# Patient Record
Sex: MALE | Race: WHITE | NOT HISPANIC OR LATINO | Employment: OTHER | URBAN - METROPOLITAN AREA
[De-identification: names, ages, dates, MRNs, and addresses within clinical notes are randomized per-mention and may not be internally consistent; named-entity substitution may affect disease eponyms.]

---

## 2017-01-25 ENCOUNTER — LAB VISIT (OUTPATIENT)
Dept: LAB | Facility: HOSPITAL | Age: 62
End: 2017-01-25
Attending: INTERNAL MEDICINE
Payer: MEDICARE

## 2017-01-25 ENCOUNTER — OFFICE VISIT (OUTPATIENT)
Dept: INTERNAL MEDICINE | Facility: CLINIC | Age: 62
End: 2017-01-25
Payer: MEDICARE

## 2017-01-25 VITALS
SYSTOLIC BLOOD PRESSURE: 137 MMHG | HEIGHT: 71 IN | BODY MASS INDEX: 30.55 KG/M2 | DIASTOLIC BLOOD PRESSURE: 87 MMHG | TEMPERATURE: 98 F | OXYGEN SATURATION: 98 % | HEART RATE: 78 BPM | WEIGHT: 218.19 LBS

## 2017-01-25 DIAGNOSIS — K62.5 BRIGHT RED RECTAL BLEEDING: Primary | ICD-10-CM

## 2017-01-25 DIAGNOSIS — K62.5 BRIGHT RED RECTAL BLEEDING: ICD-10-CM

## 2017-01-25 DIAGNOSIS — I10 ESSENTIAL HYPERTENSION: ICD-10-CM

## 2017-01-25 LAB
ANION GAP SERPL CALC-SCNC: 9 MMOL/L
BASOPHILS # BLD AUTO: 0.03 K/UL
BASOPHILS NFR BLD: 0.4 %
BUN SERPL-MCNC: 24 MG/DL
CALCIUM SERPL-MCNC: 9.3 MG/DL
CHLORIDE SERPL-SCNC: 104 MMOL/L
CO2 SERPL-SCNC: 26 MMOL/L
CREAT SERPL-MCNC: 1.8 MG/DL
DIFFERENTIAL METHOD: ABNORMAL
EOSINOPHIL # BLD AUTO: 0.2 K/UL
EOSINOPHIL NFR BLD: 2.4 %
ERYTHROCYTE [DISTWIDTH] IN BLOOD BY AUTOMATED COUNT: 13.2 %
EST. GFR  (AFRICAN AMERICAN): 45.3 ML/MIN/1.73 M^2
EST. GFR  (NON AFRICAN AMERICAN): 39.2 ML/MIN/1.73 M^2
GLUCOSE SERPL-MCNC: 93 MG/DL
HCT VFR BLD AUTO: 41.1 %
HGB BLD-MCNC: 14 G/DL
LYMPHOCYTES # BLD AUTO: 3.2 K/UL
LYMPHOCYTES NFR BLD: 48.1 %
MCH RBC QN AUTO: 30.4 PG
MCHC RBC AUTO-ENTMCNC: 34.1 %
MCV RBC AUTO: 89 FL
MONOCYTES # BLD AUTO: 0.7 K/UL
MONOCYTES NFR BLD: 10.4 %
NEUTROPHILS # BLD AUTO: 2.6 K/UL
NEUTROPHILS NFR BLD: 38.7 %
PLATELET # BLD AUTO: 199 K/UL
PMV BLD AUTO: 9.1 FL
POTASSIUM SERPL-SCNC: 3.7 MMOL/L
RBC # BLD AUTO: 4.61 M/UL
SODIUM SERPL-SCNC: 139 MMOL/L
WBC # BLD AUTO: 6.72 K/UL

## 2017-01-25 PROCEDURE — 99999 PR PBB SHADOW E&M-EST. PATIENT-LVL III: CPT | Mod: PBBFAC,,, | Performed by: INTERNAL MEDICINE

## 2017-01-25 PROCEDURE — 1159F MED LIST DOCD IN RCRD: CPT | Mod: S$GLB,,, | Performed by: INTERNAL MEDICINE

## 2017-01-25 PROCEDURE — 99499 UNLISTED E&M SERVICE: CPT | Mod: S$GLB,,, | Performed by: INTERNAL MEDICINE

## 2017-01-25 PROCEDURE — 3075F SYST BP GE 130 - 139MM HG: CPT | Mod: S$GLB,,, | Performed by: INTERNAL MEDICINE

## 2017-01-25 PROCEDURE — 36415 COLL VENOUS BLD VENIPUNCTURE: CPT | Mod: PO

## 2017-01-25 PROCEDURE — 85025 COMPLETE CBC W/AUTO DIFF WBC: CPT | Mod: PO

## 2017-01-25 PROCEDURE — 3079F DIAST BP 80-89 MM HG: CPT | Mod: S$GLB,,, | Performed by: INTERNAL MEDICINE

## 2017-01-25 PROCEDURE — 80048 BASIC METABOLIC PNL TOTAL CA: CPT

## 2017-01-25 PROCEDURE — 99214 OFFICE O/P EST MOD 30 MIN: CPT | Mod: S$GLB,,, | Performed by: INTERNAL MEDICINE

## 2017-01-25 NOTE — PROGRESS NOTES
REASON FOR VISIT:  This is a 61-year-old male.  Reason for his visit: a unique   situation with his bowel function yesterday.  When he woke up yesterday morning   he went to the bathroom and there was a gush of bright red blood that came out   when he defecated.  He did not see any stool.  There was no abdominal pain.  No   rectal pain.  Then, he went to the bathroom yesterday afternoon, a small amount   came out.  He may have seen a little bit of brown feces, but was feeling well   otherwise.  This morning, he had a bowel function.  Everything was normal, a   solid, formed brown stool.  Before this occurred, he did not have any issues   with his bowel function and no rectal bleeding.  A colonoscopy in April 2016 was   normal other than a hyperplastic polyp.    MEDICAL HISTORY:  1.  Hypertension.  2.  Hyperlipidemia.  3.  Positive calcium score.  4.  Nephrolithiasis.    MEDICATIONS:  This is also a situation that is unique.  He has stopped all his   medications of allopurinol, benazepril, Zetia, fenofibrate, hydrochlorothiazide,   tamsulosin.  In November or December is when he had issues with   nephrolithiasis.  His blood pressure was low and therefore stopped these   medications and has not restarted.    PHYSICAL EXAMINATION:  VITAL SIGNS:  Weight is 218 pounds, pulse 76, blood pressure 137/92.  LUNGS:  Clear.  HEART:  Regular rate and rhythm.  ABDOMEN:  Active bowel sounds, soft, nontender.  Prostate is moderately   enlarged.  RECTAL:  Stool is brown, heme-negative.    IMPRESSION:  1.  Acute rectal bleeding, probably internal hemorrhoid bleed.  2.  Hypertension.    PLAN:    Today, I am going to get a CBC and basic metabolic profile.    We will not restart any medicine right now, but if so, I will restart just the   benazepril and maybe the fenofibrate.    Right now, he feels his urination is good. Flow is good most of the time and no   incomplete emptying.          /rosy [id number] review        JAM/ELIZABETH  dd: 01/25/2017  15:30:46 (CST)  td: 01/26/2017 03:40:13 (CST)  Doc ID   #[id number]  Job ID #[id number]    CC:

## 2017-01-25 NOTE — MR AVS SNAPSHOT
[facility] [address]  [address]  [address]  [facility] [address]  Phone: [phone]                  Demarco Means   2017 3:15 PM   Office Visit    Description:  Male : 1955   Provider:  Marky Mills MD   Department:  [facility] [address]           Reason for Visit     Rectal Bleeding           Diagnoses this Visit        Comments    Bright red rectal bleeding    -  Primary     Essential hypertension                To Do List           Future Appointments        Provider Department Dept Phone    2017 3:45 PM LAB, [place] [facility] [phone]      Goals (5 Years of Data)     None      [facility] On Call     [facility] Nurse Care Line -  Assistance  Registered nurses in the [facility] Center provide clinical advisement, health education, appointment booking, and other advisory services.  Call for this free service at [phone].             Medications           Message regarding Medications     Verify the changes and/or additions to your medication regime listed below are the same as discussed with your clinician today.  If any of these changes or additions are incorrect, please notify your healthcare provider.             Verify that the below list of medications is an accurate representation of the medications you are currently taking.  If none reported, the list may be blank. If incorrect, please contact your healthcare provider. Carry this list with you in case of emergency.           Current Medications     allopurinol (ZYLOPRIM) 100 MG tablet Take 1 tablet (100 mg total) by mouth once daily.    benazepril (LOTENSIN) 20 MG tablet Take 1 tablet (20 mg total) by mouth once daily.    coenzyme Q10 400 mg Cap Take by mouth.    ezetimibe (ZETIA) 10 mg tablet Take 1 tablet (10 mg total) by mouth once daily.    fenofibrate 160 mg Tab Take 1 tablet (160 mg total) by mouth once daily.    fenofibrate 160  "MG Tab TAKE ONE TABLET BY MOUTH ONE TIME DAILY    hydrochlorothiazide (MICROZIDE) 12.5 mg capsule Take 1 capsule (12.5 mg total) by mouth once daily.    sildenafil (VIAGRA) 100 MG tablet Take 1 tablet (100 mg total) by mouth daily as needed for Erectile Dysfunction.    tamsulosin (FLOMAX) 0.4 mg Cp24 TAKE ONE CAPSULE BY MOUTH ONE TIME DAILY           Clinical Reference Information           Vital Signs - Last Recorded  Most recent update: 1/25/2017  3:07 PM by Desire Lema MA    BP Pulse Temp Ht Wt SpO2    137/87 (BP Location: Left arm, Patient Position: Sitting, BP Method: Automatic) 78 98 °F (36.7 °C) (Oral) 5' 10.5" (1.791 m) 99 kg (218 lb 3.2 oz) 98%    BMI                30.87 kg/m2          Blood Pressure          Most Recent Value    BP  137/87      Allergies as of 1/25/2017     1,3-butylene Glycol    Ciprofloxacin    Levaquin [Levofloxacin]      Immunizations Administered on Date of Encounter - 1/25/2017     None      Orders Placed During Today's Visit     Future Labs/Procedures Expected by Expires    Basic metabolic panel  1/25/2017 1/25/2018    CBC auto differential  1/25/2017 1/25/2018      [facility] Sign-Up     Activating your [facility] account is as easy as 1-2-3!     1) Visit [email or web address], select Sign Up Now, enter this activation code and your date of birth, then select Next.  [email or web address]  Expires: 3/11/2017  3:31 PM      2) Create a username and password to use when you visit [facility] in the future and select a security question in case you lose your password and select Next.    3) Enter your e-mail address and click Sign Up!    Additional Information  If you have questions, please e-mail [email or web address] or call [phone] to talk to our [facility] staff. Remember, [facility] is NOT to be used for urgent needs. For medical emergencies, dial 911.         "

## 2017-02-09 ENCOUNTER — TELEPHONE (OUTPATIENT)
Dept: INTERNAL MEDICINE | Facility: CLINIC | Age: 62
End: 2017-02-09

## 2017-02-09 NOTE — TELEPHONE ENCOUNTER
Pt is requesting rx for sore throat (Amoxicillin) pt is also  requesting lab results for 1/25/17    Please advise thanks.

## 2017-02-09 NOTE — TELEPHONE ENCOUNTER
----- Message from Brendon Cunha sent at 2/9/2017 10:18 AM CST -----  Contact: Self/[phone] cell  Patient is calling to request medication for a sore throat. He would like the Rx sent to the [facility] on file. Please call and advise.    Thank you!

## 2017-02-10 ENCOUNTER — TELEPHONE (OUTPATIENT)
Dept: INTERNAL MEDICINE | Facility: CLINIC | Age: 62
End: 2017-02-10

## 2017-02-10 ENCOUNTER — OFFICE VISIT (OUTPATIENT)
Dept: INTERNAL MEDICINE | Facility: CLINIC | Age: 62
End: 2017-02-10
Payer: MEDICARE

## 2017-02-10 VITALS
WEIGHT: 219 LBS | DIASTOLIC BLOOD PRESSURE: 88 MMHG | SYSTOLIC BLOOD PRESSURE: 127 MMHG | TEMPERATURE: 98 F | BODY MASS INDEX: 31.35 KG/M2 | HEIGHT: 70 IN | HEART RATE: 92 BPM

## 2017-02-10 DIAGNOSIS — J01.00 ACUTE NON-RECURRENT MAXILLARY SINUSITIS: Primary | ICD-10-CM

## 2017-02-10 DIAGNOSIS — N18.3 CKD (CHRONIC KIDNEY DISEASE), STAGE 3 (MODERATE): ICD-10-CM

## 2017-02-10 PROCEDURE — 3074F SYST BP LT 130 MM HG: CPT | Mod: S$GLB,,, | Performed by: INTERNAL MEDICINE

## 2017-02-10 PROCEDURE — 99999 PR PBB SHADOW E&M-EST. PATIENT-LVL III: CPT | Mod: PBBFAC,,, | Performed by: INTERNAL MEDICINE

## 2017-02-10 PROCEDURE — 96372 THER/PROPH/DIAG INJ SC/IM: CPT | Mod: S$GLB,,, | Performed by: INTERNAL MEDICINE

## 2017-02-10 PROCEDURE — 3079F DIAST BP 80-89 MM HG: CPT | Mod: S$GLB,,, | Performed by: INTERNAL MEDICINE

## 2017-02-10 PROCEDURE — 99213 OFFICE O/P EST LOW 20 MIN: CPT | Mod: 25,S$GLB,, | Performed by: INTERNAL MEDICINE

## 2017-02-10 RX ORDER — AMOXICILLIN 875 MG/1
875 TABLET, FILM COATED ORAL 2 TIMES DAILY
Qty: 20 TABLET | Refills: 0 | Status: SHIPPED | OUTPATIENT
Start: 2017-02-10 | End: 2017-04-26

## 2017-02-10 RX ORDER — BETAMETHASONE SODIUM PHOSPHATE AND BETAMETHASONE ACETATE 3; 3 MG/ML; MG/ML
12 INJECTION, SUSPENSION INTRA-ARTICULAR; INTRALESIONAL; INTRAMUSCULAR; SOFT TISSUE ONCE
Status: COMPLETED | OUTPATIENT
Start: 2017-02-10 | End: 2017-02-10

## 2017-02-10 RX ADMIN — BETAMETHASONE SODIUM PHOSPHATE AND BETAMETHASONE ACETATE 12 MG: 3; 3 INJECTION, SUSPENSION INTRA-ARTICULAR; INTRALESIONAL; INTRAMUSCULAR; SOFT TISSUE at 04:02

## 2017-02-10 NOTE — MR AVS SNAPSHOT
[facility] [address]  [address]  [address]  [facility] [address]  Phone: [phone]                  Demarco Means   2/10/2017 4:15 PM   Office Visit    Description:  Male : 1955   Provider:  Marky Mills MD   Department:  [facility] [address]           Reason for Visit     Cough     URI           Diagnoses this Visit        Comments    Acute non-recurrent maxillary sinusitis    -  Primary     CKD (chronic kidney disease), stage 3 (moderate)                To Do List           Goals (5 Years of Data)     None       These Medications        Disp Refills Start End    amoxicillin (AMOXIL) 875 MG tablet 20 tablet 0 2/10/2017     Take 1 tablet (875 mg total) by mouth 2 (two) times daily. - Oral    [facility] - [place] [address] #: [phone]         [facility]     [facility] On Call Nurse Care Line -  Assistance  Registered nurses in the [facility] On Call Center provide clinical advisement, health education, appointment booking, and other advisory services.  Call for this free service at [phone].             Medications           Message regarding Medications     Verify the changes and/or additions to your medication regime listed below are the same as discussed with your clinician today.  If any of these changes or additions are incorrect, please notify your healthcare provider.        START taking these NEW medications        Refills    amoxicillin (AMOXIL) 875 MG tablet 0    Sig: Take 1 tablet (875 mg total) by mouth 2 (two) times daily.    Class: Normal    Route: Oral      These medications were administered today        Dose Freq    betamethasone acetate-betamethasone sodium phosphate injection 12 mg 12 mg Once    Sig: Inject 2 mLs (12 mg total) into the muscle once.    Class: Normal    Route: Intramuscular      STOP taking these medications     allopurinol (ZYLOPRIM) 100 MG  "tablet Take 1 tablet (100 mg total) by mouth once daily.    ezetimibe (ZETIA) 10 mg tablet Take 1 tablet (10 mg total) by mouth once daily.    fenofibrate 160 mg Tab Take 1 tablet (160 mg total) by mouth once daily.    fenofibrate 160 MG Tab TAKE ONE TABLET BY MOUTH ONE TIME DAILY    hydrochlorothiazide (MICROZIDE) 12.5 mg capsule Take 1 capsule (12.5 mg total) by mouth once daily.    tamsulosin (FLOMAX) 0.4 mg Cp24 TAKE ONE CAPSULE BY MOUTH ONE TIME DAILY           Verify that the below list of medications is an accurate representation of the medications you are currently taking.  If none reported, the list may be blank. If incorrect, please contact your healthcare provider. Carry this list with you in case of emergency.           Current Medications     amoxicillin (AMOXIL) 875 MG tablet Take 1 tablet (875 mg total) by mouth 2 (two) times daily.    benazepril (LOTENSIN) 20 MG tablet Take 1 tablet (20 mg total) by mouth once daily.    coenzyme Q10 400 mg Cap Take by mouth.    sildenafil (VIAGRA) 100 MG tablet Take 1 tablet (100 mg total) by mouth daily as needed for Erectile Dysfunction.           Clinical Reference Information           Your Vitals Were     BP Pulse Temp Height Weight BMI    127/88 92 97.9 °F (36.6 °C) 5' 10" (1.778 m) 99.3 kg (219 lb) 31.42 kg/m2      Blood Pressure          Most Recent Value    BP  127/88      Allergies as of 2/10/2017     1,3-butylene Glycol    Ciprofloxacin    Levaquin [Levofloxacin]      Immunizations Administered on Date of Encounter - 2/10/2017     None      Orders Placed During Today's Visit     Future Labs/Procedures Expected by Expires    Basic metabolic panel  2/20/2017 2/10/2018      [facility] Sign-Up     Activating your [facility] account is as easy as 1-2-3!     1) Visit [email or web address], select Sign Up Now, enter this activation code and your date of birth, then select Next.  [email or web address]  Expires: 3/11/2017  3:31 PM      2) Create a username and password to use " when you visit [facility] in the future and select a security question in case you lose your password and select Next.    3) Enter your e-mail address and click Sign Up!    Additional Information  If you have questions, please e-mail [email or web address] or call [phone] to talk to our [facility] staff. Remember, [facility] is NOT to be used for urgent needs. For medical emergencies, dial 911.         Language Assistance Services     ATTENTION: Language assistance services are available, free of charge. Please call [phone].      ATENCIÓN: Si habla español, tiene a mazariegos disposición servicios gratuitos de asistencia lingüística. Llame al [phone].     CHÚ Ý: N?u b?n nói Ti?ng Vi?t, có các d?ch v? h? tr? ngôn ng? mi?n phí dành cho b?n. G?i s? [phone].         [facility] [address] complies with applicable Federal civil rights laws and does not discriminate on the basis of race, color, national origin, age, disability, or sex.

## 2017-02-10 NOTE — TELEPHONE ENCOUNTER
----- Message from Bernarda Calix sent at 2/10/2017  8:07 AM CST -----  Contact: Self/[phone] cell   Pt said that he is calling in regards to needing to check the status of the doctor sending a rx for a sore throat and cold to the [facility] on file pt stated that he called on yesterday and has not received a return call. Please call and advise          Thank you

## 2017-02-10 NOTE — PROGRESS NOTES
REASON FOR VISIT:  This is a 61-year-old male.  For four to five days now, he   has been congested in his head and his throat is hurting.  He hears a pop and he   is blowing out thick green mucus, feels very weak.  No chest pain, shortness of   breath.  He recently was taking care of his two grandchildren who were also   sick.  Also, while he is here, it was noted from his last visit that his   creatinine still remains elevated at 1.8.  I feel that we are going to need to   repeat this.  I am going to repeat this in 10 days, but then during this time,   proper hydration was discussed.    IMPRESSION:  Acute sinusitis.    PLAN:  Amoxicillin 875 mg twice a day for 10 days and Celestone 12 mg IM.  Use   of Mucinex.    /sc [id number] review      JAM/ELIZABETH  dd: 02/10/2017 16:38:51 (CST)  td: 02/11/2017 07:58:33 (CST)  Doc ID   #[id number]  Job ID #[id number]    CC:

## 2017-02-10 NOTE — PROGRESS NOTES
Verified patients name and date of birth, instructed patient to remain in building for 15 min, patient acknowledged understanding of instructions, patient tolerated injection well.

## 2017-02-13 ENCOUNTER — TELEPHONE (OUTPATIENT)
Dept: INTERNAL MEDICINE | Facility: CLINIC | Age: 62
End: 2017-02-13

## 2017-02-13 NOTE — TELEPHONE ENCOUNTER
----- Message from Marky Mills MD sent at 2/10/2017  4:40 PM CST -----  Set up bmp lab on 2-20 monday  MD Joanie Jose MA                     Set up bmp lab on 2-20 monday         Patient schedule for BMP and noti

## 2017-02-15 ENCOUNTER — TELEPHONE (OUTPATIENT)
Dept: INTERNAL MEDICINE | Facility: CLINIC | Age: 62
End: 2017-02-15

## 2017-02-15 NOTE — TELEPHONE ENCOUNTER
----- Message from Marky Mills MD sent at 2/10/2017  4:40 PM CST -----  Set up bmp lab on 2-20 monday  Patient lab schedule for 2/20/17 and notified...by phone.

## 2017-02-20 ENCOUNTER — LAB VISIT (OUTPATIENT)
Dept: LAB | Facility: HOSPITAL | Age: 62
End: 2017-02-20
Attending: INTERNAL MEDICINE
Payer: MEDICARE

## 2017-02-20 DIAGNOSIS — N18.3 CKD (CHRONIC KIDNEY DISEASE), STAGE 3 (MODERATE): ICD-10-CM

## 2017-02-20 LAB
ANION GAP SERPL CALC-SCNC: 10 MMOL/L
BUN SERPL-MCNC: 20 MG/DL
CALCIUM SERPL-MCNC: 9.4 MG/DL
CHLORIDE SERPL-SCNC: 102 MMOL/L
CO2 SERPL-SCNC: 27 MMOL/L
CREAT SERPL-MCNC: 1.3 MG/DL
EST. GFR  (AFRICAN AMERICAN): >60 ML/MIN/1.73 M^2
EST. GFR  (NON AFRICAN AMERICAN): 57.7 ML/MIN/1.73 M^2
GLUCOSE SERPL-MCNC: 100 MG/DL
POTASSIUM SERPL-SCNC: 4 MMOL/L
SODIUM SERPL-SCNC: 139 MMOL/L

## 2017-02-20 PROCEDURE — 36415 COLL VENOUS BLD VENIPUNCTURE: CPT | Mod: PO

## 2017-02-20 PROCEDURE — 80048 BASIC METABOLIC PNL TOTAL CA: CPT

## 2017-04-21 DIAGNOSIS — I10 ESSENTIAL HYPERTENSION: ICD-10-CM

## 2017-04-21 DIAGNOSIS — N18.2 CHRONIC KIDNEY DISEASE, STAGE II (MILD): ICD-10-CM

## 2017-04-21 RX ORDER — BENAZEPRIL HYDROCHLORIDE 20 MG/1
20 TABLET ORAL DAILY
Qty: 90 TABLET | Refills: 3 | Status: SHIPPED | OUTPATIENT
Start: 2017-04-21 | End: 2018-04-17 | Stop reason: SDUPTHER

## 2017-04-21 NOTE — TELEPHONE ENCOUNTER
----- Message from Daxa Riddle sent at 4/21/2017  9:41 AM CDT -----  Contact: pt [phone]  Pt would like a call back regarding transferring his prescription to a different pharmacy

## 2017-04-21 NOTE — TELEPHONE ENCOUNTER
Pt states that he moved to [place] and wanted to change his pharmacy. He is also requesting the benazepril be refill

## 2017-04-26 ENCOUNTER — OFFICE VISIT (OUTPATIENT)
Dept: INTERNAL MEDICINE | Facility: CLINIC | Age: 62
End: 2017-04-26
Payer: MEDICARE

## 2017-04-26 ENCOUNTER — LAB VISIT (OUTPATIENT)
Dept: LAB | Facility: HOSPITAL | Age: 62
End: 2017-04-26
Attending: INTERNAL MEDICINE
Payer: MEDICARE

## 2017-04-26 VITALS
HEIGHT: 70 IN | HEART RATE: 65 BPM | WEIGHT: 215.81 LBS | DIASTOLIC BLOOD PRESSURE: 88 MMHG | SYSTOLIC BLOOD PRESSURE: 132 MMHG | BODY MASS INDEX: 30.9 KG/M2

## 2017-04-26 DIAGNOSIS — I10 ESSENTIAL HYPERTENSION: Primary | ICD-10-CM

## 2017-04-26 DIAGNOSIS — N18.3 CKD (CHRONIC KIDNEY DISEASE), STAGE 3 (MODERATE): ICD-10-CM

## 2017-04-26 DIAGNOSIS — N20.0 NEPHROLITHIASIS: ICD-10-CM

## 2017-04-26 DIAGNOSIS — E78.5 HYPERLIPIDEMIA, UNSPECIFIED HYPERLIPIDEMIA TYPE: ICD-10-CM

## 2017-04-26 DIAGNOSIS — I10 ESSENTIAL HYPERTENSION: ICD-10-CM

## 2017-04-26 LAB
ANION GAP SERPL CALC-SCNC: 8 MMOL/L
BUN SERPL-MCNC: 18 MG/DL
CALCIUM SERPL-MCNC: 9.8 MG/DL
CHLORIDE SERPL-SCNC: 105 MMOL/L
CHOLEST/HDLC SERPL: 6.3 {RATIO}
CO2 SERPL-SCNC: 28 MMOL/L
CREAT SERPL-MCNC: 1.2 MG/DL
EST. GFR  (AFRICAN AMERICAN): >60 ML/MIN/1.73 M^2
EST. GFR  (NON AFRICAN AMERICAN): >60 ML/MIN/1.73 M^2
GLUCOSE SERPL-MCNC: 93 MG/DL
HDL/CHOLESTEROL RATIO: 16 %
HDLC SERPL-MCNC: 219 MG/DL
HDLC SERPL-MCNC: 35 MG/DL
LDLC SERPL CALC-MCNC: 149.4 MG/DL
NONHDLC SERPL-MCNC: 184 MG/DL
POTASSIUM SERPL-SCNC: 4.2 MMOL/L
SODIUM SERPL-SCNC: 141 MMOL/L
TRIGL SERPL-MCNC: 173 MG/DL
URATE SERPL-MCNC: 7.4 MG/DL

## 2017-04-26 PROCEDURE — 3075F SYST BP GE 130 - 139MM HG: CPT | Mod: S$GLB,,, | Performed by: INTERNAL MEDICINE

## 2017-04-26 PROCEDURE — 36415 COLL VENOUS BLD VENIPUNCTURE: CPT | Mod: PO

## 2017-04-26 PROCEDURE — 80061 LIPID PANEL: CPT

## 2017-04-26 PROCEDURE — 1160F RVW MEDS BY RX/DR IN RCRD: CPT | Mod: S$GLB,,, | Performed by: INTERNAL MEDICINE

## 2017-04-26 PROCEDURE — 3079F DIAST BP 80-89 MM HG: CPT | Mod: S$GLB,,, | Performed by: INTERNAL MEDICINE

## 2017-04-26 PROCEDURE — 99214 OFFICE O/P EST MOD 30 MIN: CPT | Mod: S$GLB,,, | Performed by: INTERNAL MEDICINE

## 2017-04-26 PROCEDURE — 80048 BASIC METABOLIC PNL TOTAL CA: CPT

## 2017-04-26 PROCEDURE — 99999 PR PBB SHADOW E&M-EST. PATIENT-LVL III: CPT | Mod: PBBFAC,,, | Performed by: INTERNAL MEDICINE

## 2017-04-26 PROCEDURE — 84550 ASSAY OF BLOOD/URIC ACID: CPT

## 2017-04-26 NOTE — PROGRESS NOTES
REASON FOR VISIT:  The patient is a 62-year-old male who comes in regarding his   blood pressure.  Starting about a week ago, he has been checking his readings.    He has gotten diastolic sometimes in the 140s to as high as 184, consistently in   the 150s, and then diastolics in the 90s, one time being 104, other time being   84.  Last week, he was a little bit jittery because of a move that he is making   to [place].  That is when he started taking it again.  Previously, he was   getting good readings.  At one point, it was reported this year, he did cut his   benazepril 20 mg to half a tablet because it was getting low, but past week, he   went back to 20 mg, but despite this, he still is getting elevated readings, and   actually somewhere along in December, because of having weakness,   gastroenteritis, he has stopped a number of his medications, which include   benazepril at that time.  Benazepril was restarted from a visit in January, but   he was on allopurinol and hydrochlorothiazide and Flomax.  They were given   because of BPH, but also had nephrolithiasis with high urine calcium and uric   acid.    MEDICAL HISTORY:  1.  Hypertension.  2.  Hyperlipidemia.  3.  BPH.  4.  Nephrolithiasis.  5.  Lumbar degenerative disk disease.    CURRENT MEDICINES:  Right now include benazepril 20 mg a day.    REVIEW OF SYMPTOMS:  No chronic chest pain, shortness of breath or palpitations.    PHYSICAL EXAMINATION:  VITAL SIGNS:  Weight is 215 pounds, pulse 60 to 65, blood pressure by me in the   right arm 130/86, left arm 132/88.  LUNGS:  Clear.  HEART:  Regular rate and rhythm.  No murmurs.  2+ carotid pulses.    Also, I have noted with his labs that he does have a degree of chronic kidney   disease stage III based on his creatinine.  It did go up a little bit more when   he was having the diarrhea, but last visit, it was 1.3 back in February.    IMPRESSION:  1.  Hypertension.  2.  Chronic kidney disease, stage III.  3.   Nephrolithiasis.  4.  Hyperlipidemia.    PLAN:    Today while he is here we will be getting chemistry profile, lipid profile and   we will check uric acid.   Phone review to followup.        /rosy [id number] blank(s)        JAM/ELIZABETH  dd: 04/26/2017 12:05:53 (CDT)  td: 04/27/2017 03:32:52 (CDT)  Doc ID   #[id number]  Job ID #[id number]    CC:

## 2017-04-26 NOTE — MR AVS SNAPSHOT
[facility] [address]  [address]  [address]  [facility] [address]  Phone: [phone]                  Demarco Means   2017 11:30 AM   Office Visit    Description:  Male : 1955   Provider:  Marky Mills MD   Department:  [facility] [address]           Reason for Visit     Blood Pressure Check           Diagnoses this Visit        Comments    Essential hypertension    -  Primary     Hyperlipidemia, unspecified hyperlipidemia type         CKD (chronic kidney disease), stage 3 (moderate)         Nephrolithiasis                To Do List           Goals (5 Years of Data)     None      [facility]     [facility] On Call Nurse Care Line -  Assistance  Unless otherwise directed by your provider, please contact [facility], our nurse care line that is available for  assistance.     Registered nurses in the [facility] On Call Center provide: appointment scheduling, clinical advisement, health education, and other advisory services.  Call: [phone] (toll free)               Medications           Message regarding Medications     Verify the changes and/or additions to your medication regime listed below are the same as discussed with your clinician today.  If any of these changes or additions are incorrect, please notify your healthcare provider.        STOP taking these medications     amoxicillin (AMOXIL) 875 MG tablet Take 1 tablet (875 mg total) by mouth 2 (two) times daily.           Verify that the below list of medications is an accurate representation of the medications you are currently taking.  If none reported, the list may be blank. If incorrect, please contact your healthcare provider. Carry this list with you in case of emergency.           Current Medications     benazepril (LOTENSIN) 20 MG tablet Take 1 tablet (20 mg total) by mouth once daily.    coenzyme Q10 400 mg Cap Take by mouth.    sildenafil (VIAGRA) 100 MG  "tablet Take 1 tablet (100 mg total) by mouth daily as needed for Erectile Dysfunction.           Clinical Reference Information           Your Vitals Were     BP Pulse Height Weight BMI    132/88 65 5' 10" (1.778 m) 97.9 kg (215 lb 12.8 oz) 30.96 kg/m2      Blood Pressure          Most Recent Value    BP  132/88      Allergies as of 4/26/2017     1,3-butylene Glycol    Ciprofloxacin    Levaquin [Levofloxacin]      Immunizations Administered on Date of Encounter - 4/26/2017     None      Orders Placed During Today's Visit     Future Labs/Procedures Expected by Expires    Basic metabolic panel  4/26/2017 4/26/2018    Lipid panel  4/26/2017 4/26/2018    Uric acid  4/26/2017 4/26/2018      [facility] Sign-Up     Activating your [facility] account is as easy as 1-2-3!     1) Visit [email or web address], select Sign Up Now, enter this activation code and your date of birth, then select Next.  [email or web address]  Expires: 6/10/2017 12:07 PM      2) Create a username and password to use when you visit [facility] in the future and select a security question in case you lose your password and select Next.    3) Enter your e-mail address and click Sign Up!    Additional Information  If you have questions, please e-mail [email or web address] or call [phone] to talk to our [facility] staff. Remember, [facility] is NOT to be used for urgent needs. For medical emergencies, dial 911.         Language Assistance Services     ATTENTION: Language assistance services are available, free of charge. Please call [phone].      ATENCIÓN: Si habla español, tiene a mazariegos disposición servicios gratuitos de asistencia lingüística. Llame al [phone].     CHÚ Ý: N?u b?n nói Ti?ng Vi?t, có các d?ch v? h? tr? ngôn ng? mi?n phí dành cho b?n. G?i s? [phone].         [facility] [address] complies with applicable Federal civil rights laws and does not discriminate on the basis of race, color, national origin, age, disability, or " sex.

## 2017-04-27 ENCOUNTER — TELEPHONE (OUTPATIENT)
Dept: INTERNAL MEDICINE | Facility: CLINIC | Age: 62
End: 2017-04-27

## 2017-04-27 ENCOUNTER — DOCUMENTATION ONLY (OUTPATIENT)
Dept: INTERNAL MEDICINE | Facility: CLINIC | Age: 62
End: 2017-04-27

## 2017-04-27 DIAGNOSIS — E78.2 MIXED HYPERLIPIDEMIA: ICD-10-CM

## 2017-04-27 DIAGNOSIS — Z00.00 ANNUAL PHYSICAL EXAM: Primary | ICD-10-CM

## 2017-04-27 DIAGNOSIS — Z12.5 ENCOUNTER FOR SCREENING FOR MALIGNANT NEOPLASM OF PROSTATE: ICD-10-CM

## 2017-04-27 DIAGNOSIS — R93.1 AGATSTON CAC SCORE, >400: ICD-10-CM

## 2017-04-27 DIAGNOSIS — I10 ESSENTIAL HYPERTENSION: ICD-10-CM

## 2017-04-27 RX ORDER — ALLOPURINOL 100 MG/1
100 TABLET ORAL DAILY
Qty: 30 TABLET | Refills: 11 | Status: SHIPPED | OUTPATIENT
Start: 2017-04-27 | End: 2018-05-13 | Stop reason: SDUPTHER

## 2017-04-27 RX ORDER — FENOFIBRATE 160 MG/1
160 TABLET ORAL DAILY
Qty: 30 TABLET | Refills: 11
Start: 2017-04-27 | End: 2017-05-25 | Stop reason: SDUPTHER

## 2017-04-27 NOTE — PROGRESS NOTES
Lab studies noted      Restart allopurinol 100 mg daily    He will restart fenofibrate at 160 mg daily    He believes this did not cause any issues with him    Physical in 3 months

## 2017-04-27 NOTE — TELEPHONE ENCOUNTER
----- Message from Nirmala Nunez sent at 4/27/2017  9:59 AM CDT -----  Contact: Patient  Type: Returning a call    Who left a message? Dr. Mills    When did the practice call? Today     Comments: Please call the patient at [phone].    Thanks!

## 2017-04-27 NOTE — TELEPHONE ENCOUNTER
Physical in 3 months with prelabs                 Patient schedule for 3 month Phys appt/prior labs.  Appt's mail out to home address on file.

## 2017-05-17 ENCOUNTER — OFFICE VISIT (OUTPATIENT)
Dept: INTERNAL MEDICINE | Facility: CLINIC | Age: 62
End: 2017-05-17
Payer: MEDICARE

## 2017-05-17 VITALS
SYSTOLIC BLOOD PRESSURE: 140 MMHG | BODY MASS INDEX: 31.34 KG/M2 | HEART RATE: 82 BPM | HEIGHT: 70 IN | WEIGHT: 218.94 LBS | DIASTOLIC BLOOD PRESSURE: 70 MMHG

## 2017-05-17 DIAGNOSIS — K21.9 GASTROESOPHAGEAL REFLUX DISEASE, ESOPHAGITIS PRESENCE NOT SPECIFIED: ICD-10-CM

## 2017-05-17 DIAGNOSIS — I10 ESSENTIAL HYPERTENSION: ICD-10-CM

## 2017-05-17 DIAGNOSIS — E78.5 HYPERLIPIDEMIA, UNSPECIFIED HYPERLIPIDEMIA TYPE: ICD-10-CM

## 2017-05-17 DIAGNOSIS — M54.50 CHRONIC LOW BACK PAIN WITHOUT SCIATICA, UNSPECIFIED BACK PAIN LATERALITY: ICD-10-CM

## 2017-05-17 DIAGNOSIS — I70.0 AORTIC ATHEROSCLEROSIS: ICD-10-CM

## 2017-05-17 DIAGNOSIS — G89.29 CHRONIC LOW BACK PAIN WITHOUT SCIATICA, UNSPECIFIED BACK PAIN LATERALITY: ICD-10-CM

## 2017-05-17 DIAGNOSIS — H81.09 MENIERE DISEASE, UNSPECIFIED LATERALITY: ICD-10-CM

## 2017-05-17 DIAGNOSIS — Z00.00 ENCOUNTER FOR PREVENTIVE HEALTH EXAMINATION: Primary | ICD-10-CM

## 2017-05-17 DIAGNOSIS — N18.2 CKD (CHRONIC KIDNEY DISEASE), STAGE 2 (MILD): ICD-10-CM

## 2017-05-17 DIAGNOSIS — N40.0 BENIGN NODULAR PROSTATIC HYPERPLASIA WITHOUT LOWER URINARY TRACT SYMPTOMS: ICD-10-CM

## 2017-05-17 PROCEDURE — 3078F DIAST BP <80 MM HG: CPT | Mod: S$GLB,,, | Performed by: NURSE PRACTITIONER

## 2017-05-17 PROCEDURE — G0439 PPPS, SUBSEQ VISIT: HCPCS | Mod: S$GLB,,, | Performed by: NURSE PRACTITIONER

## 2017-05-17 PROCEDURE — 99999 PR PBB SHADOW E&M-EST. PATIENT-LVL III: CPT | Mod: PBBFAC,,, | Performed by: NURSE PRACTITIONER

## 2017-05-17 PROCEDURE — 3077F SYST BP >= 140 MM HG: CPT | Mod: S$GLB,,, | Performed by: NURSE PRACTITIONER

## 2017-05-17 NOTE — PROGRESS NOTES
"Demarco Moseley Miguelangel presented for a  Medicare AWV and comprehensive Health Risk Assessment today. The following components were reviewed and updated:    · Medical history  · Family History  · Social history  · Allergies and Current Medications  · Health Risk Assessment  · Health Maintenance  · Care Team     ** See Completed Assessments for Annual Wellness Visit within the encounter summary.**       The following assessments were completed:  · Living Situation  · CAGE  · Depression Screening  · Timed Get Up and Go  · Whisper Test  · Cognitive Function Screening  ·   ·   ·   · Nutrition Screening  · ADL Screening  · PAQ Screening    Vitals:    05/17/17 1539   BP: (!) 140/70   BP Location: Left arm   Pulse: 82   Weight: 99.3 kg (218 lb 14.7 oz)   Height: 5' 10" (1.778 m)     Body mass index is 31.41 kg/(m^2).  Physical Exam      Diagnoses and health risks identified today and associated recommendations/orders:    1. Encounter for preventive health examination  Here for Health Risk Assessment. Follow up in one year.    2. Essential hypertension  Chronic, stable on current medication. Followed by PCP.    3. Aortic atherosclerosis  Chronic, stable on current medications. Noted on CT of abdomen/pelvis 10/21/16. Followed by PCP.    4. Hyperlipidemia, unspecified hyperlipidemia type  Chronic, stable on current medications. Statin intolerance. Followed by PCP.    5. CKD (chronic kidney disease), stage 2 (mild)  Chronic, mild, stable. Followed by PCP.    6. Gastroesophageal reflux disease, esophagitis presence not specified  Chronic, stable. Followed by PCP.    7. Benign nodular prostatic hyperplasia without lower urinary tract symptoms  Chronic, stable. Followed by PCP, Urology.    8. Meniere disease, unspecified laterality  Chronic, stable. Followed by PCP.    9. Chronic low back pain without sciatica, unspecified back pain laterality  Chronic, reports worsening back pain. Followed by PCP.      Provided Demarco Moseley with " a 5-10 year written screening schedule and personal prevention plan. Recommendations were developed using the USPSTF age appropriate recommendations. Education, counseling, and referrals were provided as needed. After Visit Summary printed and given to patient which includes a list of additional screenings\tests needed.    Return in 2 months (on 7/27/2017).with PCP.    Aurelia Finnegan NP

## 2017-05-17 NOTE — MR AVS SNAPSHOT
[facility]  [address]  [place] [address]  Phone: [phone]  Fax: [phone]                  Demarco Means   2017 4:00 PM   Office Visit    Description:  Male : 1955   Provider:  HRA, NOM 3   Department:  [facility]           Diagnoses this Visit        Comments    Encounter for preventive health examination    -  Primary            To Do List           Future Appointments        Provider Department Dept Phone    2017 8:30 AM LAB, [place] [facility] [phone]    2017 2:30 PM [facility] [address] [phone]      Goals (5 Years of Data)     None      [facility]     [facility] Nurse Care Line -  Assistance  Unless otherwise directed by your provider, please contact [facility], our nurse care line that is available for  assistance.     Registered nurses in the [facility] provide: appointment scheduling, clinical advisement, health education, and other advisory services.  Call: [phone] (toll free)               Medications           Message regarding Medications     Verify the changes and/or additions to your medication regime listed below are the same as discussed with your clinician today.  If any of these changes or additions are incorrect, please notify your healthcare provider.             Verify that the below list of medications is an accurate representation of the medications you are currently taking.  If none reported, the list may be blank. If incorrect, please contact your healthcare provider. Carry this list with you in case of emergency.           Current Medications     allopurinol (ZYLOPRIM) 100 MG tablet Take 1 tablet (100 mg total) by mouth once daily.    benazepril (LOTENSIN) 20 MG tablet Take 1 tablet (20 mg total) by mouth once daily.    coenzyme Q10 400 mg Cap Take by mouth.    fenofibrate 160 MG Tab Take 1  "tablet (160 mg total) by mouth once daily.    multivitamin capsule Take 1 capsule by mouth once daily.    sildenafil (VIAGRA) 100 MG tablet Take 1 tablet (100 mg total) by mouth daily as needed for Erectile Dysfunction.           Clinical Reference Information           Your Vitals Were     BP Pulse Height Weight BMI    140/70 (BP Location: Left arm) 82 5' 10" (1.778 m) 99.3 kg (218 lb 14.7 oz) 31.41 kg/m2      Blood Pressure          Most Recent Value    BP  (!)  140/70      Allergies as of 5/17/2017     1,3-butylene Glycol    Ciprofloxacin    Levaquin [Levofloxacin]      Immunizations Administered on Date of Encounter - 5/17/2017     None      [facility] Sign-Up     Activating your [facility] account is as easy as 1-2-3!     1) Visit [email or web address], select Sign Up Now, enter this activation code and your date of birth, then select Next.  [email or web address]  Expires: 6/10/2017 12:07 PM      2) Create a username and password to use when you visit [facility] in the future and select a security question in case you lose your password and select Next.    3) Enter your e-mail address and click Sign Up!    Additional Information  If you have questions, please e-mail [email or web address] or call [phone] to talk to our [facility] staff. Remember, [facility] is NOT to be used for urgent needs. For medical emergencies, dial 911.         Instructions      Counseling and Referral of Other Preventative  (Italic type indicates deductible and co-insurance are waived)    Patient Name: Demarco Means  Today's Date: 5/17/2017      SERVICE LIMITATIONS RECOMMENDATION    Vaccines    · Pneumococcal (once after 65)    · Influenza (annually)    · Hepatitis B (if medium/high risk)    · Prevnar 13      Hepatitis B medium/high risk factors:       - End-stage renal disease       - Hemophiliacs who received Factor VII or         IX concentrates       - Clients of institutions for the mentally             retarded       - Persons who " live in the same house as          a HepB carrier       - Homosexual men       - Illicit injectable drug abusers     Pneumococcal: N/A     Influenza: Recommended to patient, declined     Hepatitis B: N/A     Prevnar 13: N/A    Prostate cancer screening (annually to age 75)     Prostate specific antigen (PSA) Shared decision making with Provider. Sometimes a co-pay may be required if the patient decides to have this test. The USPSTF no longer recommends prostate cancer screening routinely in medicine: per PCP    Colorectal cancer screening (to age 75)    · Fecal occult blood test (annual)  · Flexible sigmoidoscopy (5y)  · Screening colonoscopy (10y)  · Barium enema   Last done 4/2016, recommend to repeat every 5  years    Diabetes self-management training (no USPSTF recommendations)  Requires referral by treating physician for patient with diabetes or renal disease. 10 hours of initial DSMT sessions of no less than 30 minutes each in a continuous 12-month period. 2 hours of follow-up DSMT in subsequent years.  N/A    Glaucoma screening (no USPSTF recommendation)  Diabetes mellitus, family history   , age 50 or over    American, age 65 or over  N/A    Medical nutrition therapy for diabetes or renal disease (no recommended schedule)  Requires referral by treating physician for patient with diabetes or renal disease or kidney transplant within the past 3 years.  Can be provided in same year as diabetes self-management training (DSMT), and CMS recommends medical nutrition therapy take place after DSMT. Up to 3 hours for initial year and 2 hours in subsequent years.  N/A    Cardiovascular screening blood tests (every 5 years)  · Fasting lipid panel  Order as a panel if possible  Done this year, repeat every year    Diabetes screening tests (at least every 3 years, Medicare covers annually or at 6-month intervals for prediabetic patients)  · Fasting blood sugar (FBS) or glucose tolerance test (GTT)   Patient must be diagnosed with one of the following:       - Hypertension       - Dyslipidemia       - Obesity (BMI 30kg/m2)       - Previous elevated impaired FBS or GTT       ... or any two of the following:       - Overweight (BMI 25 but <30)       - Family history of diabetes       - Age 65 or older       - History of gestational diabetes or birth of baby weighing more than 9 pounds  Done this year, repeat every year    Abdominal aortic aneurysm screening (once)  · Sonogram   Limited to patients who meet one of the following criteria:       - Men who are 65-75 years old and have smoked more than 100 cigarette in their lifetime       - Anyone with a family history of abdominal aortic aneurysm       - Anyone recommended for screening by the USPSTF  N/A    HIV screening (annually for increased risk patients)  · HIV-1 and HIV-2 by EIA, or VASU, rapid antibody test or oral mucosa transudate  Patients must be at increased risk for HIV infection per USPSTF guidelines or pregnant. Tests covered annually for patient at increased risk or as requested by the patient. Pregnant patients may receive up to 3 tests during pregnancy.  Risks discussed, screening is not recommended    Smoking cessation counseling (up to 8 sessions per year)  Patients must be asymptomatic of tobacco-related conditions to receive as a preventative service.  Non-smoker    Subsequent annual wellness visit  At least 12 months since last AWV  Return in one year     The following information is provided to all patients.  This information is to help you find resources for any of the problems found today that may be affecting your health:                Living healthy guide: www.[facility].[place].gov      Understanding Diabetes: www.diabetes.org      Eating healthy: www.cdc.gov/healthyweight      CDC home safety checklist: www.cdc.gov/steadi/patient.html      Agency on Aging: www.goea.[place].gov      Alcoholics anonymous (AA): www.aa.org      Physical  Activity: www.angel.nih.gov/yo3prmj      Tobacco use: www.quitwithusla.org          Language Assistance Services     ATTENTION: Language assistance services are available, free of charge. Please call [phone].      ATENCIÓN: Si ashish spence, tiene a mazariegos disposición servicios gratuitos de asistencia lingüística. Llame al [phone].     CHÚ Ý: N?u b?n nói Ti?ng Vi?t, có các d?ch v? h? tr? ngôn ng? mi?n phí dành cho b?n. G?i s? [phone].         [facility] complies with applicable Federal civil rights laws and does not discriminate on the basis of race, color, national origin, age, disability, or sex.

## 2017-05-17 NOTE — PATIENT INSTRUCTIONS
Counseling and Referral of Other Preventative  (Italic type indicates deductible and co-insurance are waived)    Patient Name: Demarco Means  Today's Date: 5/17/2017      SERVICE LIMITATIONS RECOMMENDATION    Vaccines    · Pneumococcal (once after 65)    · Influenza (annually)    · Hepatitis B (if medium/high risk)    · Prevnar 13      Hepatitis B medium/high risk factors:       - End-stage renal disease       - Hemophiliacs who received Factor VII or         IX concentrates       - Clients of institutions for the mentally             retarded       - Persons who live in the same house as          a HepB carrier       - Homosexual men       - Illicit injectable drug abusers     Pneumococcal: N/A     Influenza: Recommended to patient, declined     Hepatitis B: N/A     Prevnar 13: N/A    Prostate cancer screening (annually to age 75)     Prostate specific antigen (PSA) Shared decision making with Provider. Sometimes a co-pay may be required if the patient decides to have this test. The USPSTF no longer recommends prostate cancer screening routinely in medicine: per PCP    Colorectal cancer screening (to age 75)    · Fecal occult blood test (annual)  · Flexible sigmoidoscopy (5y)  · Screening colonoscopy (10y)  · Barium enema   Last done 4/2016, recommend to repeat every 5  years    Diabetes self-management training (no USPSTF recommendations)  Requires referral by treating physician for patient with diabetes or renal disease. 10 hours of initial DSMT sessions of no less than 30 minutes each in a continuous 12-month period. 2 hours of follow-up DSMT in subsequent years.  N/A    Glaucoma screening (no USPSTF recommendation)  Diabetes mellitus, family history   , age 50 or over    American, age 65 or over  N/A    Medical nutrition therapy for diabetes or renal disease (no recommended schedule)  Requires referral by treating physician for patient with diabetes or renal disease or kidney  transplant within the past 3 years.  Can be provided in same year as diabetes self-management training (DSMT), and CMS recommends medical nutrition therapy take place after DSMT. Up to 3 hours for initial year and 2 hours in subsequent years.  N/A    Cardiovascular screening blood tests (every 5 years)  · Fasting lipid panel  Order as a panel if possible  Done this year, repeat every year    Diabetes screening tests (at least every 3 years, Medicare covers annually or at 6-month intervals for prediabetic patients)  · Fasting blood sugar (FBS) or glucose tolerance test (GTT)  Patient must be diagnosed with one of the following:       - Hypertension       - Dyslipidemia       - Obesity (BMI 30kg/m2)       - Previous elevated impaired FBS or GTT       ... or any two of the following:       - Overweight (BMI 25 but <30)       - Family history of diabetes       - Age 65 or older       - History of gestational diabetes or birth of baby weighing more than 9 pounds  Done this year, repeat every year    Abdominal aortic aneurysm screening (once)  · Sonogram   Limited to patients who meet one of the following criteria:       - Men who are 65-75 years old and have smoked more than 100 cigarette in their lifetime       - Anyone with a family history of abdominal aortic aneurysm       - Anyone recommended for screening by the USPSTF  N/A    HIV screening (annually for increased risk patients)  · HIV-1 and HIV-2 by EIA, or VASU, rapid antibody test or oral mucosa transudate  Patients must be at increased risk for HIV infection per USPSTF guidelines or pregnant. Tests covered annually for patient at increased risk or as requested by the patient. Pregnant patients may receive up to 3 tests during pregnancy.  Risks discussed, screening is not recommended    Smoking cessation counseling (up to 8 sessions per year)  Patients must be asymptomatic of tobacco-related conditions to receive as a preventative service.  Non-smoker     Subsequent annual wellness visit  At least 12 months since last AWV  Return in one year     The following information is provided to all patients.  This information is to help you find resources for any of the problems found today that may be affecting your health:                Living healthy guide: www.[facility].[place].[facility]      Understanding Diabetes: www.diabetes.org      Eating healthy: www.cdc.gov/healthyweight      CDC home safety checklist: www.cdc.gov/steadi/patient.html      Agency on Aging: www.goea.[place].[facility]      Alcoholics anonymous (AA): www.aa.org      Physical Activity: www.agnel.nih.gov/sc8ynmu      Tobacco use: www.quitwithusla.org

## 2017-05-25 RX ORDER — FENOFIBRATE 160 MG/1
160 TABLET ORAL DAILY
Qty: 30 TABLET | Refills: 11
Start: 2017-05-25 | End: 2018-06-26 | Stop reason: SDUPTHER

## 2017-05-25 NOTE — TELEPHONE ENCOUNTER
----- Message from Ethan Sheikh sent at 5/25/2017  8:40 AM CDT -----  Contact: self [phone]  Type: Rx    Name of medication(s): fenofibrate 160 MG Tab    Is this a refill? New rx? Refill      Who prescribed medication?    Pharmacy Name, Phone, & Location: [facility] on file [phone]    Comments: please advise , Thanks !

## 2017-05-30 ENCOUNTER — TELEPHONE (OUTPATIENT)
Dept: INTERNAL MEDICINE | Facility: CLINIC | Age: 62
End: 2017-05-30

## 2017-05-30 NOTE — TELEPHONE ENCOUNTER
----- Message from Nirmala Nunez sent at 5/30/2017  8:09 AM CDT -----  Contact: Patient  The patient says the [facility] did not receive the fenofibrate.  Please resend.    Thanks!

## 2017-08-01 ENCOUNTER — OFFICE VISIT (OUTPATIENT)
Dept: INTERNAL MEDICINE | Facility: CLINIC | Age: 62
End: 2017-08-01
Payer: MEDICARE

## 2017-08-01 VITALS
DIASTOLIC BLOOD PRESSURE: 76 MMHG | SYSTOLIC BLOOD PRESSURE: 130 MMHG | BODY MASS INDEX: 31.72 KG/M2 | WEIGHT: 221.56 LBS | HEART RATE: 81 BPM | HEIGHT: 70 IN

## 2017-08-01 DIAGNOSIS — E78.00 PURE HYPERCHOLESTEROLEMIA: ICD-10-CM

## 2017-08-01 DIAGNOSIS — I10 ESSENTIAL HYPERTENSION: ICD-10-CM

## 2017-08-01 DIAGNOSIS — M54.50 CHRONIC LOW BACK PAIN WITHOUT SCIATICA, UNSPECIFIED BACK PAIN LATERALITY: ICD-10-CM

## 2017-08-01 DIAGNOSIS — G89.29 CHRONIC LOW BACK PAIN WITHOUT SCIATICA, UNSPECIFIED BACK PAIN LATERALITY: ICD-10-CM

## 2017-08-01 DIAGNOSIS — H81.03 MENIERE DISEASE, BILATERAL: ICD-10-CM

## 2017-08-01 DIAGNOSIS — Z00.00 ANNUAL PHYSICAL EXAM: Primary | ICD-10-CM

## 2017-08-01 DIAGNOSIS — N40.0 BENIGN PROSTATIC HYPERPLASIA WITHOUT LOWER URINARY TRACT SYMPTOMS: ICD-10-CM

## 2017-08-01 PROCEDURE — 99499 UNLISTED E&M SERVICE: CPT | Mod: S$GLB,,, | Performed by: INTERNAL MEDICINE

## 2017-08-01 PROCEDURE — 99999 PR PBB SHADOW E&M-EST. PATIENT-LVL III: CPT | Mod: PBBFAC,,, | Performed by: INTERNAL MEDICINE

## 2017-08-01 PROCEDURE — 99396 PREV VISIT EST AGE 40-64: CPT | Mod: S$GLB,,, | Performed by: INTERNAL MEDICINE

## 2017-08-01 NOTE — PROGRESS NOTES
HPI:   62-year-old male comes in for an annual routine check.  Overall in general has been feeling well.  A new supplement days taken is called super beats and actually feels well with this as well as taking ubiquinol and multivitamin.  He recently went to the hearing center in which there is hearing impairment and recommended to pursue hearing aids     PAST MEDICAL HISTORY:  Hypertension.  Hyperlipidemia.  BPH.  Lumbar degenerative disk disease with previous back surgeries.  Meniere's disease.  Gastroesophageal reflux disease.  Nephrolithiasis.  Prior prostatitis.  Adenomatous colon polyp in .    SOCIAL HISTORY:  Tobacco use and alcohol use - none.  , one living son.    One son is .  Works intermittently as a .    FAMILY HISTORY:  Father is , hypertension, Parkinson's disease.  Mother   is , heart disease.  Three sisters, all alive.  One sister with heart   disease.  Two brothers , one from an aneurysm, one from drug use.    Screening:  Colonoscopy 2016 revealed a hyperplastic polyp              Current Medications:  Current Outpatient Prescriptions   Medication Sig Dispense Refill    allopurinol (ZYLOPRIM) 100 MG tablet Take 1 tablet (100 mg total) by mouth once daily. 30 tablet 11    benazepril (LOTENSIN) 20 MG tablet Take 1 tablet (20 mg total) by mouth once daily. 90 tablet 3    coenzyme Q10 400 mg Cap Take by mouth.      fenofibrate 160 MG Tab Take 1 tablet (160 mg total) by mouth once daily. 30 tablet 11    multivitamin capsule Take 1 capsule by mouth once daily.      sildenafil (VIAGRA) 100 MG tablet Take 1 tablet (100 mg total) by mouth daily as needed for Erectile Dysfunction. 10 tablet 0     No current facility-administered medications for this visit.          Review of Systems  Reports no chest pain, palpitations, shortness of breath, cough, abdominal pain  Bowel function is regular  No difficulty urinating, no incontinence  No headaches,  "heartburn, indigestion  Has chronic nonradicular low back pain  Episodically if he gets too much sodium he will experience tinnitus and vertigo-like symptoms    Vitals:  /76   Pulse 81   Ht 5' 10" (1.778 m)   Wt 100.5 kg (221 lb 9 oz)   BMI 31.79 kg/m²       Physical Exam  Gen. appearance: No acute distress  HEENT exam: Earwax in both ear canals which was clean, nasal mucosa is clear, oropharynx no normal findings  Neck: No thyromegaly no masses  Lungs: Clear breath sounds  Heart: Regular rate and rhythm no murmurs or gallops  Abdominal exam: Soft nontender no hepatosplenomegaly abdominal masses  Pulses 2+ carotid and 2+ pedal pulses, no bruits  Extremities: No edema  Lymph gland: No palpable adenopathy  Genitalia: No scrotal masses  Digital rectal exam: Stool is brown heme-negative prostate minimally enlarged    Visit Diagnosis:        ICD-10-CM ICD-9-CM   1. Annual physical exam Z00.00 V70.0   2. Essential hypertension I10 401.9   3. Pure hypercholesterolemia E78.00 272.0   4. Chronic low back pain without sciatica, unspecified back pain laterality M54.5 724.2    G89.29 338.29   5. Benign prostatic hyperplasia without lower urinary tract symptoms N40.0 600.00   6. Meniere disease, bilateral H81.03 386.00         Plan:  Maintain proper diet and physical activity  Discussed ways and clean in his ears  And follow-up with the [facility] regarding hearing  No orders of the defined types were placed in this encounter.    "

## 2017-08-03 ENCOUNTER — LAB VISIT (OUTPATIENT)
Dept: LAB | Facility: HOSPITAL | Age: 62
End: 2017-08-03
Attending: INTERNAL MEDICINE
Payer: MEDICARE

## 2017-08-03 DIAGNOSIS — Z00.00 ANNUAL PHYSICAL EXAM: ICD-10-CM

## 2017-08-03 DIAGNOSIS — R93.1 AGATSTON CAC SCORE, >400: ICD-10-CM

## 2017-08-03 DIAGNOSIS — Z12.5 ENCOUNTER FOR SCREENING FOR MALIGNANT NEOPLASM OF PROSTATE: ICD-10-CM

## 2017-08-03 DIAGNOSIS — I10 ESSENTIAL HYPERTENSION: ICD-10-CM

## 2017-08-03 DIAGNOSIS — E78.2 MIXED HYPERLIPIDEMIA: ICD-10-CM

## 2017-08-03 LAB
ALBUMIN SERPL BCP-MCNC: 3.9 G/DL
ALP SERPL-CCNC: 41 U/L
ALT SERPL W/O P-5'-P-CCNC: 22 U/L
ANION GAP SERPL CALC-SCNC: 9 MMOL/L
AST SERPL-CCNC: 19 U/L
BASOPHILS # BLD AUTO: 0.02 K/UL
BASOPHILS NFR BLD: 0.2 %
BILIRUB SERPL-MCNC: 0.6 MG/DL
BUN SERPL-MCNC: 25 MG/DL
CALCIUM SERPL-MCNC: 9.5 MG/DL
CHLORIDE SERPL-SCNC: 106 MMOL/L
CHOLEST/HDLC SERPL: 5.7 {RATIO}
CO2 SERPL-SCNC: 24 MMOL/L
COMPLEXED PSA SERPL-MCNC: 0.85 NG/ML
CREAT SERPL-MCNC: 1.7 MG/DL
DIFFERENTIAL METHOD: ABNORMAL
EOSINOPHIL # BLD AUTO: 0.2 K/UL
EOSINOPHIL NFR BLD: 2 %
ERYTHROCYTE [DISTWIDTH] IN BLOOD BY AUTOMATED COUNT: 12.7 %
EST. GFR  (AFRICAN AMERICAN): 48 ML/MIN/1.73 M^2
EST. GFR  (NON AFRICAN AMERICAN): 42 ML/MIN/1.73 M^2
GLUCOSE SERPL-MCNC: 95 MG/DL
HCT VFR BLD AUTO: 40.2 %
HDL/CHOLESTEROL RATIO: 17.5 %
HDLC SERPL-MCNC: 212 MG/DL
HDLC SERPL-MCNC: 37 MG/DL
HGB BLD-MCNC: 13.5 G/DL
LDLC SERPL CALC-MCNC: 143.2 MG/DL
LYMPHOCYTES # BLD AUTO: 3 K/UL
LYMPHOCYTES NFR BLD: 36.9 %
MCH RBC QN AUTO: 30.4 PG
MCHC RBC AUTO-ENTMCNC: 33.6 G/DL
MCV RBC AUTO: 91 FL
MONOCYTES # BLD AUTO: 0.6 K/UL
MONOCYTES NFR BLD: 7.5 %
NEUTROPHILS # BLD AUTO: 4.3 K/UL
NEUTROPHILS NFR BLD: 53.4 %
NONHDLC SERPL-MCNC: 175 MG/DL
PLATELET # BLD AUTO: 230 K/UL
PMV BLD AUTO: 9.5 FL
POTASSIUM SERPL-SCNC: 4.7 MMOL/L
PROT SERPL-MCNC: 7.2 G/DL
RBC # BLD AUTO: 4.44 M/UL
SODIUM SERPL-SCNC: 139 MMOL/L
TRIGL SERPL-MCNC: 159 MG/DL
TSH SERPL DL<=0.005 MIU/L-ACNC: 0.92 UIU/ML
URATE SERPL-MCNC: 6.6 MG/DL
WBC # BLD AUTO: 8.11 K/UL

## 2017-08-03 PROCEDURE — 84550 ASSAY OF BLOOD/URIC ACID: CPT

## 2017-08-03 PROCEDURE — 84153 ASSAY OF PSA TOTAL: CPT

## 2017-08-03 PROCEDURE — 80053 COMPREHEN METABOLIC PANEL: CPT

## 2017-08-03 PROCEDURE — 85025 COMPLETE CBC W/AUTO DIFF WBC: CPT

## 2017-08-03 PROCEDURE — 80061 LIPID PANEL: CPT

## 2017-08-03 PROCEDURE — 84443 ASSAY THYROID STIM HORMONE: CPT

## 2017-08-03 PROCEDURE — 36415 COLL VENOUS BLD VENIPUNCTURE: CPT

## 2017-09-05 ENCOUNTER — TELEPHONE (OUTPATIENT)
Dept: INTERNAL MEDICINE | Facility: CLINIC | Age: 62
End: 2017-09-05

## 2017-09-05 DIAGNOSIS — R79.89 CREATININE ELEVATION: Primary | ICD-10-CM

## 2017-09-05 NOTE — TELEPHONE ENCOUNTER
Type: Test Results     What test was performed? Labs     Who ordered the test?     When and where were the test performed?  8/3/17     Comments: requesting to speak with Dr Mills, Please call. Thanks!

## 2017-09-05 NOTE — TELEPHONE ENCOUNTER
----- Message from Laine Mora MA sent at 9/5/2017  4:23 PM CDT -----  Contact: self - [phone]   Type: Test Results    What test was performed? Labs     Who ordered the test?    When and where were the test performed?  8/3/17     Comments: requesting to speak with Dr Mills, Please call. Thanks!

## 2017-09-14 ENCOUNTER — HOSPITAL ENCOUNTER (OUTPATIENT)
Dept: RADIOLOGY | Facility: HOSPITAL | Age: 62
Discharge: HOME | End: 2017-09-14
Attending: INTERNAL MEDICINE
Payer: MEDICARE

## 2017-09-14 DIAGNOSIS — R79.89 CREATININE ELEVATION: ICD-10-CM

## 2017-09-14 PROCEDURE — 76770 US EXAM ABDO BACK WALL COMP: CPT | Mod: 26,,, | Performed by: RADIOLOGY

## 2017-09-14 PROCEDURE — 76770 US EXAM ABDO BACK WALL COMP: CPT | Mod: TC

## 2017-09-20 ENCOUNTER — TELEPHONE (OUTPATIENT)
Dept: INTERNAL MEDICINE | Facility: CLINIC | Age: 62
End: 2017-09-20

## 2017-09-20 ENCOUNTER — DOCUMENTATION ONLY (OUTPATIENT)
Dept: INTERNAL MEDICINE | Facility: CLINIC | Age: 62
End: 2017-09-20

## 2017-09-20 DIAGNOSIS — I10 ESSENTIAL HYPERTENSION: Primary | ICD-10-CM

## 2017-09-20 DIAGNOSIS — R93.1 AGATSTON CAC SCORE, >400: ICD-10-CM

## 2017-09-20 DIAGNOSIS — E78.00 PURE HYPERCHOLESTEROLEMIA: ICD-10-CM

## 2017-09-20 DIAGNOSIS — N18.2 STAGE 2 CHRONIC KIDNEY DISEASE: ICD-10-CM

## 2017-09-20 NOTE — TELEPHONE ENCOUNTER
----- Message from Lillian Wellington sent at 9/20/2017 10:50 AM CDT -----  Contact: Patient [phone]  Test Results Request:    Test Performed: Lab    When & Where: 09/14/2017 [facility] [place]    Please call and advise.    Thank You

## 2017-09-20 NOTE — PROGRESS NOTES
Recent labs noted     creatinine is 1.6       however he had a normal renal ultrasound with normal kidney size and cortical medullary differentiation      he is a very muscular person and a creatinine of 1.6 1.7 may be normal to him        nevertheless discussed the importance of maintaining proper control his blood pressure and cholesterol and avoid the use of nonsteroidal anti-inflammatory drugs as much as possible

## 2017-09-20 NOTE — TELEPHONE ENCOUNTER
Test Results Request:     Test Performed: Lab     When & Where: 09/14/2017 [facility] [place]     Please call and advise.     Thank You

## 2017-09-26 ENCOUNTER — TELEPHONE (OUTPATIENT)
Dept: INTERNAL MEDICINE | Facility: CLINIC | Age: 62
End: 2017-09-26

## 2017-09-26 NOTE — TELEPHONE ENCOUNTER
Ep 6 months with prelabs      Patient place on 6 month hold list.  Lbs schedule, mail out to home address on file.

## 2018-02-09 ENCOUNTER — HOSPITAL ENCOUNTER (EMERGENCY)
Facility: HOSPITAL | Age: 63
Discharge: HOME | End: 2018-02-09
Attending: EMERGENCY MEDICINE
Payer: MEDICARE

## 2018-02-09 VITALS
BODY MASS INDEX: 31.35 KG/M2 | WEIGHT: 219 LBS | DIASTOLIC BLOOD PRESSURE: 82 MMHG | HEART RATE: 98 BPM | RESPIRATION RATE: 17 BRPM | SYSTOLIC BLOOD PRESSURE: 149 MMHG | OXYGEN SATURATION: 100 % | HEIGHT: 70 IN

## 2018-02-09 DIAGNOSIS — I10 HYPERTENSION, ESSENTIAL: ICD-10-CM

## 2018-02-09 DIAGNOSIS — F41.0 ANXIETY ATTACK: Primary | ICD-10-CM

## 2018-02-09 DIAGNOSIS — G47.00 INSOMNIA, UNSPECIFIED TYPE: ICD-10-CM

## 2018-02-09 LAB
AMPHET+METHAMPHET UR QL: NEGATIVE
BARBITURATES UR QL SCN>200 NG/ML: NEGATIVE
BASOPHILS # BLD AUTO: 0.03 K/UL
BASOPHILS NFR BLD: 0.5 %
BENZODIAZ UR QL SCN>200 NG/ML: NEGATIVE
BZE UR QL SCN: NEGATIVE
CANNABINOIDS UR QL SCN: NEGATIVE
CREAT UR-MCNC: 60 MG/DL
DIFFERENTIAL METHOD: ABNORMAL
EOSINOPHIL # BLD AUTO: 0.1 K/UL
EOSINOPHIL NFR BLD: 1.8 %
ERYTHROCYTE [DISTWIDTH] IN BLOOD BY AUTOMATED COUNT: 12.2 %
HCT VFR BLD AUTO: 37 %
HGB BLD-MCNC: 12.9 G/DL
IMM GRANULOCYTES # BLD AUTO: 0.05 K/UL
IMM GRANULOCYTES NFR BLD AUTO: 0.8 %
INR PPP: 1
LYMPHOCYTES # BLD AUTO: 2 K/UL
LYMPHOCYTES NFR BLD: 32.2 %
MCH RBC QN AUTO: 30.8 PG
MCHC RBC AUTO-ENTMCNC: 34.9 G/DL
MCV RBC AUTO: 88 FL
METHADONE UR QL SCN>300 NG/ML: NEGATIVE
MONOCYTES # BLD AUTO: 0.6 K/UL
MONOCYTES NFR BLD: 10 %
NEUTROPHILS # BLD AUTO: 3.4 K/UL
NEUTROPHILS NFR BLD: 54.7 %
NRBC BLD-RTO: 0 /100 WBC
OPIATES UR QL SCN: NEGATIVE
PCP UR QL SCN>25 NG/ML: NEGATIVE
PLATELET # BLD AUTO: 217 K/UL
PMV BLD AUTO: 9.4 FL
PROTHROMBIN TIME: 10.4 SEC
RBC # BLD AUTO: 4.19 M/UL
TOXICOLOGY INFORMATION: NORMAL
TROPONIN I SERPL DL<=0.01 NG/ML-MCNC: <0.006 NG/ML
WBC # BLD AUTO: 6.28 K/UL

## 2018-02-09 PROCEDURE — 80307 DRUG TEST PRSMV CHEM ANLYZR: CPT

## 2018-02-09 PROCEDURE — 85025 COMPLETE CBC W/AUTO DIFF WBC: CPT

## 2018-02-09 PROCEDURE — 93010 ELECTROCARDIOGRAM REPORT: CPT | Mod: ,,, | Performed by: INTERNAL MEDICINE

## 2018-02-09 PROCEDURE — 99285 EMERGENCY DEPT VISIT HI MDM: CPT | Mod: 25

## 2018-02-09 PROCEDURE — 63600175 PHARM REV CODE 636 W HCPCS: Performed by: EMERGENCY MEDICINE

## 2018-02-09 PROCEDURE — 84484 ASSAY OF TROPONIN QUANT: CPT

## 2018-02-09 PROCEDURE — 96361 HYDRATE IV INFUSION ADD-ON: CPT

## 2018-02-09 PROCEDURE — 25000003 PHARM REV CODE 250: Performed by: EMERGENCY MEDICINE

## 2018-02-09 PROCEDURE — 93005 ELECTROCARDIOGRAM TRACING: CPT

## 2018-02-09 PROCEDURE — 96374 THER/PROPH/DIAG INJ IV PUSH: CPT

## 2018-02-09 PROCEDURE — 85610 PROTHROMBIN TIME: CPT

## 2018-02-09 RX ORDER — LORAZEPAM 2 MG/ML
0.5 INJECTION INTRAMUSCULAR
Status: COMPLETED | OUTPATIENT
Start: 2018-02-09 | End: 2018-02-09

## 2018-02-09 RX ORDER — LORAZEPAM 2 MG/ML
0.5 INJECTION INTRAMUSCULAR
Status: DISCONTINUED | OUTPATIENT
Start: 2018-02-09 | End: 2018-02-09

## 2018-02-09 RX ORDER — LORAZEPAM 1 MG/1
0.5 TABLET ORAL EVERY 6 HOURS PRN
Qty: 5 TABLET | Refills: 0 | Status: SHIPPED | OUTPATIENT
Start: 2018-02-09 | End: 2018-05-24

## 2018-02-09 RX ADMIN — SODIUM CHLORIDE 1000 ML: 0.9 INJECTION, SOLUTION INTRAVENOUS at 03:02

## 2018-02-09 RX ADMIN — LORAZEPAM 0.5 MG: 2 INJECTION INTRAMUSCULAR; INTRAVENOUS at 03:02

## 2018-02-09 NOTE — ED NOTES
APPEARANCE: awake and alert in NAD.  SKIN: warm, dry and intact. No breakdown or bruising.  MUSCULOSKELETAL: Patient moving all extremities spontaneously, no obvious swelling or deformities noted. Ambulates independently.  RESPIRATORY: no shortness of breath. RR 20 equal and unlabored.   CARDIAC: heart tones normal.  BPM; regular rhythm; 2+ distal pulses; no peripheral edema  ABDOMEN: S/ND/NT, normoactive bowel sounds present in all four quadrants. Normal stool pattern.  : voids spontaneously without difficulty.  Neurologic: AAO x 4; follows commands equal strength in all extremities;No numbness and tingling.

## 2018-02-09 NOTE — ED PROVIDER NOTES
Encounter Date: 2/9/2018    SCRIBE #1 NOTE: I, Tania Barragan, am scribing for, and in the presence of,  Dr. Dominguez . I have scribed the following portions of the note - Other sections scribed: HPI, ROS, PE.       History     Chief Complaint   Patient presents with    Dizziness     Pt states he became dizzy this afternoon accompanied by blurry vision in his right eye.     Hypertension     /108 upon EMS arrival     Time patient was seen by the provider: 2:24 PM    The patient is a 62 y.o. male with co-morbidities including: HTN, hyperlipidemia, GERD, and back pain who presents to the ED via EMS with a complaint of weakness. Pt states he went to pump gas and realized that he was unable to focus on items from his right eye. He was able to go home and he began feeling weak, having SOB, and dry mouth so he called EMS. Pt received oxygen in the field and that relieved some of his symptoms. He was noted to be hypertensive in the field with a blood pressure of 200/108. He also endorses being sick over the past couple of weeks, but he denies smoking or drinking.       The history is provided by the patient, the EMS personnel and the spouse.     Review of patient's allergies indicates:   Allergen Reactions    1,3-butylene glycol Shortness Of Breath    Ciprofloxacin Nausea Only    Levaquin [levofloxacin]      Past Medical History:   Diagnosis Date    Allergy     Back pain     BPH (benign prostatic hypertrophy)     Degenerative disc disease     GERD (gastroesophageal reflux disease)     Hyperlipidemia     Hypertension     Kidney stones     Meniere disease      Past Surgical History:   Procedure Laterality Date    BACK SURGERY      COLONOSCOPY N/A 4/7/2016    Procedure: COLONOSCOPY;  Surgeon: Esau Helms MD;  Location: [address];  Service: Endoscopy;  Laterality: N/A;  Last procedure by Sc[facility] 4/6/2011    EYE SURGERY Bilateral     bilateral    SPINE SURGERY      3 lumbar surgeries      Family History   Problem Relation Age of Onset    Hypertension Father     Parkinsonism Father     Heart disease Father     Heart disease Mother     Kidney disease Mother     Heart disease Sister     Anuerysm Brother     Cancer Maternal Uncle      pancreatic    No Known Problems Sister     No Known Problems Sister     No Known Problems Son     Melanoma Neg Hx      Social History   Substance Use Topics    Smoking status: Never Smoker    Smokeless tobacco: Never Used    Alcohol use No     Review of Systems   Constitutional: Negative for fever.   HENT: Negative for sore throat.         Positive for dry mouth.    Eyes: Positive for visual disturbance (Right (Resolved)).   Respiratory: Shortness of breath: Resolved.    Cardiovascular: Negative for chest pain.   Gastrointestinal: Negative for nausea.   Genitourinary: Negative for dysuria.   Musculoskeletal: Negative for back pain.   Skin: Negative for rash.   Neurological: Positive for weakness (Resolved ).   Hematological: Does not bruise/bleed easily.       Physical Exam     Initial Vitals [02/09/18 1408]   BP Pulse Resp Temp SpO2   (!) 200/108 (!) 115 17 -- 100 %      MAP       138.67         Physical Exam    Nursing note and vitals reviewed.  Constitutional: He appears well-developed and well-nourished. No distress.   Anxious.    HENT:   Head: Normocephalic and atraumatic.   Right Ear: External ear normal.   Left Ear: External ear normal.   Mouth/Throat: Oropharynx is clear and moist.   Eyes: EOM are normal. Pupils are equal, round, and reactive to light.   Neck: Normal range of motion. Neck supple.   Cardiovascular: Normal rate, regular rhythm and normal heart sounds. Exam reveals no gallop and no friction rub.    No murmur heard.  Pulmonary/Chest: Breath sounds normal. No respiratory distress. He has no wheezes. He has no rhonchi. He has no rales.   Abdominal: Soft. He exhibits no distension. There is no tenderness.   Musculoskeletal: Normal range  of motion.   Neurological: He is alert and oriented to person, place, and time. He has normal strength. No cranial nerve deficit or sensory deficit.   Skin: Skin is warm and dry.   Psychiatric: His behavior is normal. Thought content normal.         ED Course   Procedures  Labs Reviewed   CBC W/ AUTO DIFFERENTIAL - Abnormal; Notable for the following:        Result Value    RBC 4.19 (*)     Hemoglobin 12.9 (*)     Hematocrit 37.0 (*)     Immature Granulocytes 0.8 (*)     Immature Grans (Abs) 0.05 (*)     All other components within normal limits   TROPONIN I   PROTIME-INR   DRUG SCREEN PANEL, URINE EMERGENCY     EKG Readings: (Independently Interpreted)   Sinus Tachycardia. Left anterior fascicular block. Non specific ST changes. No STEMI.           Medical Decision Making:   History:   Old Medical Records: I decided to obtain old medical records.  Independently Interpreted Test(s):   I have ordered and independently interpreted EKG Reading(s) - see prior notes  Clinical Tests:   Lab Tests: Ordered and Reviewed  Radiological Study: Ordered and Reviewed  Medical Tests: Ordered and Reviewed            Scribe Attestation:   Scribe #1: I performed the above scribed service and the documentation accurately describes the services I performed. I attest to the accuracy of the note.    Attending Attestation:             Attending ED Notes:   3:56PM  On reexamination, pt is very relaxed with no complaints. Head CT is pending.    medical decision making: Presentation and history are consistent with anxiety attack now resolved with normal brain imaging and laboratory values patient can be discharged home to follow up with his primary doctor for further evaluation as needed.        ED Course     and follow-up patient admits to having a lot of anxiety associated with not being able to see his grandchildren after the death of his son 2 years ago and it keeps him up at night he's unable to sleep frequently his primary doctor had  him on anxiety medication in the past for similar episodes at this time he is asymptomatic feels much better his blood pressures normalized is no longer tachycardic he feels well.  Clinical Impression:   The primary encounter diagnosis was Anxiety attack. Diagnoses of Insomnia, unspecified type and Hypertension, essential were also pertinent to this visit.          I, Dr. Rayray Dominguez, personally performed the services described in this documentation.   All medical record entries made by the scribe were at my direction and in my presence.   I have reviewed the chart and agree that the record is accurate and complete.   Rayray Dominguez MD.  4:41 PM 02/09/2018                  Rayray Dominguez MD  02/09/18 1640       Rayray Dominguez MD  02/09/18 9784

## 2018-04-17 DIAGNOSIS — N18.2 CHRONIC KIDNEY DISEASE, STAGE II (MILD): ICD-10-CM

## 2018-04-17 DIAGNOSIS — I10 ESSENTIAL HYPERTENSION: ICD-10-CM

## 2018-04-17 RX ORDER — BENAZEPRIL HYDROCHLORIDE 20 MG/1
20 TABLET ORAL DAILY
Qty: 90 TABLET | Refills: 3 | Status: SHIPPED | OUTPATIENT
Start: 2018-04-17 | End: 2018-06-14 | Stop reason: SDUPTHER

## 2018-04-17 NOTE — TELEPHONE ENCOUNTER
----- Message from Glenis Abraham sent at 4/17/2018  1:58 PM CDT -----  Contact: Patient [phone]  The 6 month follow up in on 4/20/18, need lab orders.    Thank you

## 2018-04-19 ENCOUNTER — LAB VISIT (OUTPATIENT)
Dept: LAB | Facility: HOSPITAL | Age: 63
End: 2018-04-19
Attending: INTERNAL MEDICINE
Payer: MEDICARE

## 2018-04-19 DIAGNOSIS — N18.2 CHRONIC KIDNEY DISEASE, STAGE II (MILD): ICD-10-CM

## 2018-04-19 DIAGNOSIS — I10 ESSENTIAL HYPERTENSION: ICD-10-CM

## 2018-04-19 LAB
ALBUMIN SERPL BCP-MCNC: 4.1 G/DL
ALP SERPL-CCNC: 41 U/L
ALT SERPL W/O P-5'-P-CCNC: 31 U/L
ANION GAP SERPL CALC-SCNC: 8 MMOL/L
AST SERPL-CCNC: 23 U/L
BASOPHILS # BLD AUTO: 0.04 K/UL
BASOPHILS NFR BLD: 0.7 %
BILIRUB SERPL-MCNC: 0.5 MG/DL
BUN SERPL-MCNC: 22 MG/DL
CALCIUM SERPL-MCNC: 9.6 MG/DL
CHLORIDE SERPL-SCNC: 106 MMOL/L
CHOLEST SERPL-MCNC: 219 MG/DL
CHOLEST/HDLC SERPL: 5.5 {RATIO}
CO2 SERPL-SCNC: 27 MMOL/L
CREAT SERPL-MCNC: 1.6 MG/DL
DIFFERENTIAL METHOD: ABNORMAL
EOSINOPHIL # BLD AUTO: 0.2 K/UL
EOSINOPHIL NFR BLD: 3.1 %
ERYTHROCYTE [DISTWIDTH] IN BLOOD BY AUTOMATED COUNT: 12.5 %
EST. GFR  (AFRICAN AMERICAN): 51.5 ML/MIN/1.73 M^2
EST. GFR  (NON AFRICAN AMERICAN): 44.5 ML/MIN/1.73 M^2
GLUCOSE SERPL-MCNC: 95 MG/DL
HCT VFR BLD AUTO: 40.2 %
HDLC SERPL-MCNC: 40 MG/DL
HDLC SERPL: 18.3 %
HGB BLD-MCNC: 13.5 G/DL
IMM GRANULOCYTES # BLD AUTO: 0.02 K/UL
IMM GRANULOCYTES NFR BLD AUTO: 0.3 %
LDLC SERPL CALC-MCNC: 148.4 MG/DL
LYMPHOCYTES # BLD AUTO: 2.9 K/UL
LYMPHOCYTES NFR BLD: 48.4 %
MCH RBC QN AUTO: 30.7 PG
MCHC RBC AUTO-ENTMCNC: 33.6 G/DL
MCV RBC AUTO: 91 FL
MONOCYTES # BLD AUTO: 0.6 K/UL
MONOCYTES NFR BLD: 9.5 %
NEUTROPHILS # BLD AUTO: 2.2 K/UL
NEUTROPHILS NFR BLD: 38 %
NONHDLC SERPL-MCNC: 179 MG/DL
NRBC BLD-RTO: 0 /100 WBC
PLATELET # BLD AUTO: 220 K/UL
PMV BLD AUTO: 9.6 FL
POTASSIUM SERPL-SCNC: 4.8 MMOL/L
PROT SERPL-MCNC: 7.3 G/DL
RBC # BLD AUTO: 4.4 M/UL
SODIUM SERPL-SCNC: 141 MMOL/L
TRIGL SERPL-MCNC: 153 MG/DL
URATE SERPL-MCNC: 5.8 MG/DL
WBC # BLD AUTO: 5.89 K/UL

## 2018-04-19 PROCEDURE — 80061 LIPID PANEL: CPT

## 2018-04-19 PROCEDURE — 84550 ASSAY OF BLOOD/URIC ACID: CPT

## 2018-04-19 PROCEDURE — 85025 COMPLETE CBC W/AUTO DIFF WBC: CPT

## 2018-04-19 PROCEDURE — 36415 COLL VENOUS BLD VENIPUNCTURE: CPT

## 2018-04-19 PROCEDURE — 80053 COMPREHEN METABOLIC PANEL: CPT

## 2018-04-20 ENCOUNTER — TELEPHONE (OUTPATIENT)
Dept: INTERNAL MEDICINE | Facility: CLINIC | Age: 63
End: 2018-04-20

## 2018-04-20 ENCOUNTER — HOSPITAL ENCOUNTER (OUTPATIENT)
Dept: RADIOLOGY | Facility: HOSPITAL | Age: 63
Discharge: HOME | End: 2018-04-20
Attending: INTERNAL MEDICINE
Payer: MEDICARE

## 2018-04-20 ENCOUNTER — OFFICE VISIT (OUTPATIENT)
Dept: INTERNAL MEDICINE | Facility: CLINIC | Age: 63
End: 2018-04-20
Payer: MEDICARE

## 2018-04-20 ENCOUNTER — PES CALL (OUTPATIENT)
Dept: ADMINISTRATIVE | Facility: CLINIC | Age: 63
End: 2018-04-20

## 2018-04-20 VITALS
HEIGHT: 70 IN | WEIGHT: 227.5 LBS | OXYGEN SATURATION: 98 % | BODY MASS INDEX: 32.57 KG/M2 | HEART RATE: 84 BPM | DIASTOLIC BLOOD PRESSURE: 70 MMHG | SYSTOLIC BLOOD PRESSURE: 128 MMHG

## 2018-04-20 DIAGNOSIS — I10 ESSENTIAL HYPERTENSION: Primary | ICD-10-CM

## 2018-04-20 DIAGNOSIS — R93.0 ABNORMAL CT OF PARANASAL SINUSES: ICD-10-CM

## 2018-04-20 DIAGNOSIS — H53.9 TRANSIENT VISION DISTURBANCE OF RIGHT EYE: ICD-10-CM

## 2018-04-20 DIAGNOSIS — J32.0 CHRONIC MAXILLARY SINUSITIS: ICD-10-CM

## 2018-04-20 DIAGNOSIS — Z12.5 ENCOUNTER FOR SCREENING FOR MALIGNANT NEOPLASM OF PROSTATE: ICD-10-CM

## 2018-04-20 DIAGNOSIS — R79.89 CREATININE ELEVATION: ICD-10-CM

## 2018-04-20 DIAGNOSIS — E78.00 PURE HYPERCHOLESTEROLEMIA: ICD-10-CM

## 2018-04-20 DIAGNOSIS — J32.0 CHRONIC MAXILLARY SINUSITIS: Primary | ICD-10-CM

## 2018-04-20 PROCEDURE — 99214 OFFICE O/P EST MOD 30 MIN: CPT | Mod: S$GLB,,, | Performed by: INTERNAL MEDICINE

## 2018-04-20 PROCEDURE — 99499 UNLISTED E&M SERVICE: CPT | Mod: S$GLB,,, | Performed by: INTERNAL MEDICINE

## 2018-04-20 PROCEDURE — 70486 CT MAXILLOFACIAL W/O DYE: CPT | Mod: 26,,, | Performed by: RADIOLOGY

## 2018-04-20 PROCEDURE — 3074F SYST BP LT 130 MM HG: CPT | Mod: CPTII,S$GLB,, | Performed by: INTERNAL MEDICINE

## 2018-04-20 PROCEDURE — 3078F DIAST BP <80 MM HG: CPT | Mod: CPTII,S$GLB,, | Performed by: INTERNAL MEDICINE

## 2018-04-20 PROCEDURE — 70486 CT MAXILLOFACIAL W/O DYE: CPT | Mod: TC

## 2018-04-20 PROCEDURE — 99999 PR PBB SHADOW E&M-EST. PATIENT-LVL III: CPT | Mod: PBBFAC,,, | Performed by: INTERNAL MEDICINE

## 2018-04-20 RX ORDER — EZETIMIBE 10 MG/1
10 TABLET ORAL DAILY
Qty: 90 TABLET | Refills: 3 | Status: SHIPPED | OUTPATIENT
Start: 2018-04-20 | End: 2019-05-03 | Stop reason: SDUPTHER

## 2018-04-20 RX ORDER — AMOXICILLIN AND CLAVULANATE POTASSIUM 875; 125 MG/1; MG/1
1 TABLET, FILM COATED ORAL 2 TIMES DAILY
Qty: 20 TABLET | Refills: 0 | Status: SHIPPED | OUTPATIENT
Start: 2018-04-20 | End: 2018-04-30

## 2018-04-20 NOTE — PROGRESS NOTES
CT scan today reveals complete opacification right maxillary sinus     there was suggestion of a possible fungal sinusitis     will start [id number] milligrams twice a day for 10 days and referral to ENT

## 2018-04-20 NOTE — PROGRESS NOTES
PAST MEDICAL HISTORY:  Hypertension.  Hyperlipidemia.  BPH.  Lumbar degenerative disk disease with previous back surgeries.  Meniere's disease.  Gastroesophageal reflux disease.  Nephrolithiasis.  Prior prostatitis.  Adenomatous colon polyp in 2003.    SOCIAL HISTORY:  Tobacco use and alcohol use - none.      Current Medications:  Current Outpatient Prescriptions   Medication Sig Dispense Refill    allopurinol (ZYLOPRIM) 100 MG tablet Take 1 tablet (100 mg total) by mouth once daily. 30 tablet 11    benazepril (LOTENSIN) 20 MG tablet Take 1 tablet (20 mg total) by mouth once daily. 90 tablet 3    coenzyme Q10 400 mg Cap Take by mouth.      fenofibrate 160 MG Tab Take 1 tablet (160 mg total) by mouth once daily. 30 tablet 11    multivitamin capsule Take 1 capsule by mouth once daily.      sildenafil (VIAGRA) 100 MG tablet Take 1 tablet (100 mg total) by mouth daily as needed for Erectile Dysfunction. 10 tablet 0       REASON FOR VISIT:  This is a 63-year-old male who comes in for a regular routine   visit.  Currently, right now, he has been feeling well.    He had an episode in February.  He was at a gas station pumping gas.  All of a   sudden, he states the vision in his right eye became very blurry and it felt   like also it was closing off or losing vision.  He also felt dizzy, got anxious.    When he got home, he really could not move well.  EMTs came out.  Blood   pressure was elevated, sent to the Emergency Room.  CT scan of the head did not   show any acute infarct.  There was mention of chronic sinusitis in the right   maxillary cavity.    In regard to sinus issues, he states that in early March he went to Urgent Care   for sinusitis, blowing out green mucus.  He was given amoxicillin, but he says   he has been having this problem for the past few months where right now he is   congested regularly, postnasal drip, ears feel uncomfortable.    PAST MEDICAL HISTORY:  Outlined above.    MEDICATION LIST:   Noted above.    RECENT LABS:  Chemistry normal except creatinine was 1.6.  CBC normal.  Uric   acid normal.  Cholesterol 219, triglycerides 153.    Regarding the creatinine and lipid status, back in September, he had a renal   ultrasound that was normal.  There was no mention of chronic kidney disease.  In   regard to the cholesterol, he has used the statins of simvastatin, Crestor,   pravastatin, all caused him joint and muscle pain.    PHYSICAL EXAMINATION:  VITAL SIGNS:  Weight is 227 pounds, pulse 80, blood pressure by me 128/70.  NECK:  No thyromegaly.  LUNGS:  Clear breath sounds.  HEART:  Regular rate and rhythm.  ABDOMEN:  Active bowel sounds, soft, nontender.  No hepatosplenomegaly or   abdominal masses.  PULSES:  2+ carotid pulses, no bruits.  HEENT:  Nasal mucosa is clear.    IMPRESSION:  1.  Hypertension.  2.  Hyperlipidemia.  3.  Elevated creatinine, questionable if he does have chronic kidney disease.  4.  Visual disturbance, right eye.  5.  Chronic sinusitis.    PLAN:  We will arrange for a CT of the sinuses, but in addition, we would   recommend doing a Carotid Doppler.  However, he stated in December he underwent   a [facility] evaluation, which included a carotid screening.  We will first need   to get the report.  We will add Zetia 10 mg a day and regular saline irrigation   discussed.  We will set up a return appointment again in four months.          /rosy [id number] review        AILEEN/ELIZABETH  dd: 04/20/2018 08:57:21 (CDT)  td: 04/21/2018 02:18:23 (CDT)  Doc ID   #[id number]  Job ID #[id number]    CC:

## 2018-04-23 ENCOUNTER — TELEPHONE (OUTPATIENT)
Dept: INTERNAL MEDICINE | Facility: CLINIC | Age: 63
End: 2018-04-23

## 2018-04-23 NOTE — TELEPHONE ENCOUNTER
Set up referral to ent per order    Called patient left message for return phone call.    Appt schedule for ENT, mail out to home address on file.

## 2018-05-04 ENCOUNTER — TELEPHONE (OUTPATIENT)
Dept: INTERNAL MEDICINE | Facility: CLINIC | Age: 63
End: 2018-05-04

## 2018-05-04 DIAGNOSIS — H10.9 CONJUNCTIVITIS, UNSPECIFIED CONJUNCTIVITIS TYPE, UNSPECIFIED LATERALITY: Primary | ICD-10-CM

## 2018-05-04 RX ORDER — POLYMYXIN B SULFATE AND TRIMETHOPRIM 1; 10000 MG/ML; [USP'U]/ML
SOLUTION OPHTHALMIC
Qty: 10 ML | Refills: 0 | Status: SHIPPED | OUTPATIENT
Start: 2018-05-04 | End: 2018-05-24

## 2018-05-04 NOTE — TELEPHONE ENCOUNTER
Pt called in regards to getting a Rx for eye drops due to left eyes itching and watering.       [facility] pharmacy             Pt  states that he has a little crust in his eyes when he wakes up and it is a little red, he thinks its pink eye        Please advise

## 2018-05-04 NOTE — TELEPHONE ENCOUNTER
Veena, please call and let pt know that I sent eye drops into his pharmacy. He should call/RTC prn no better/worse.  thanks

## 2018-05-04 NOTE — TELEPHONE ENCOUNTER
----- Message from Argelia Lugo sent at 5/4/2018  8:10 AM CDT -----  Contact: self/[phone]  Pt called in regards to getting a Rx for eye drops due to left eyes itching and watering.       [facility] pharmacy  Please advise

## 2018-05-14 RX ORDER — ALLOPURINOL 100 MG/1
100 TABLET ORAL DAILY
Qty: 30 TABLET | Refills: 9 | Status: SHIPPED | OUTPATIENT
Start: 2018-05-14 | End: 2019-05-03 | Stop reason: SDUPTHER

## 2018-05-24 ENCOUNTER — OFFICE VISIT (OUTPATIENT)
Dept: OTOLARYNGOLOGY | Facility: CLINIC | Age: 63
End: 2018-05-24
Payer: MEDICARE

## 2018-05-24 VITALS
BODY MASS INDEX: 32.1 KG/M2 | DIASTOLIC BLOOD PRESSURE: 75 MMHG | WEIGHT: 224.19 LBS | HEART RATE: 69 BPM | SYSTOLIC BLOOD PRESSURE: 119 MMHG | HEIGHT: 70 IN

## 2018-05-24 DIAGNOSIS — J31.0 CHRONIC RHINITIS: Primary | ICD-10-CM

## 2018-05-24 DIAGNOSIS — J32.0 CHRONIC MAXILLARY SINUSITIS: ICD-10-CM

## 2018-05-24 PROCEDURE — 3078F DIAST BP <80 MM HG: CPT | Mod: CPTII,S$GLB,, | Performed by: OTOLARYNGOLOGY

## 2018-05-24 PROCEDURE — 99999 PR PBB SHADOW E&M-EST. PATIENT-LVL III: CPT | Mod: PBBFAC,,, | Performed by: OTOLARYNGOLOGY

## 2018-05-24 PROCEDURE — 3074F SYST BP LT 130 MM HG: CPT | Mod: CPTII,S$GLB,, | Performed by: OTOLARYNGOLOGY

## 2018-05-24 PROCEDURE — 3008F BODY MASS INDEX DOCD: CPT | Mod: CPTII,S$GLB,, | Performed by: OTOLARYNGOLOGY

## 2018-05-24 PROCEDURE — 99204 OFFICE O/P NEW MOD 45 MIN: CPT | Mod: 25,S$GLB,, | Performed by: OTOLARYNGOLOGY

## 2018-05-24 PROCEDURE — 31231 NASAL ENDOSCOPY DX: CPT | Mod: S$GLB,,, | Performed by: OTOLARYNGOLOGY

## 2018-05-24 RX ORDER — GLUCOSAMINE HCL 500 MG
1 TABLET ORAL DAILY
COMMUNITY
End: 2021-12-23

## 2018-05-24 RX ORDER — AMOXICILLIN AND CLAVULANATE POTASSIUM 875; 125 MG/1; MG/1
1 TABLET, FILM COATED ORAL 2 TIMES DAILY
Qty: 28 TABLET | Refills: 0 | Status: SHIPPED | OUTPATIENT
Start: 2018-05-24 | End: 2018-06-07

## 2018-05-24 RX ORDER — FLUTICASONE PROPIONATE 50 MCG
2 SPRAY, SUSPENSION (ML) NASAL DAILY
Qty: 16 G | Refills: 2 | Status: SHIPPED | OUTPATIENT
Start: 2018-05-24 | End: 2018-08-22

## 2018-05-24 NOTE — Clinical Note
May 24, 2018      Marky Mills MD  [address]  [facility] [address]           [facility] - Otorhinolaryngology  [address]  [facility] [address]  Phone: [phone]  Fax: [phone]          Patient: Demarco Means   MR Number: [id number]   YOB: 1955   Date of Visit: 5/24/2018       Dear Dr. Marky Mills:    Thank you for referring Demarco Means to me for evaluation. Attached you will find relevant portions of my assessment and plan of care.    If you have questions, please do not hesitate to call me. I look forward to following Demarco Means along with you.    Sincerely,    Calderon Street MD    Enclosure  CC:  No Recipients    If you would like to receive this communication electronically, please contact [email or web address] or [phone] to request more information on [employer] access.    For providers and/or their staff who would like to refer a patient to [facility], please contact us through our one-stop-shop provider referral line, [facility], at [phone].    If you feel you have received this communication in error or would no longer like to receive these types of communications, please e-mail [email or web address]

## 2018-05-24 NOTE — PROGRESS NOTES
Subjective:      Demarco Means is a 63 y.o. male who was referred to me by Dr. Marky Mills in consultation for sinusitis.    He was in his usual state of health until about 1 month ago when he had the acute onset of vision loss in his right eye.  He presented to the ED and underwent tests that were reportedly normal, although he did have markedly elevated blood pressure, which is now being managed by Dr. Mills.  However, the CT at that time showed an incidental finding of right maxillary sinus opacification, which prompted today's visit.  He describes longstanding, perennial, daily nasal congestion, runny nose and postnasal drip, which he has managed with saline and sometimes OTC antihistamines, but no nasal sprays.  He notes also occasional mild left cheek tenderness, but denies right-sided symptoms, hyposmia, aural fullness or pruritic symptoms.  He describes 2 or 3 sinus infections annually that are treated with antibiotics, most recently in January 2018, and also had 10 days of Augmentin after the recent scan.  He recalls allergy testing in the past that was negative, and childhood asthma that resolved.  He denies prior nasal trauma or surgery.    SNOT-22 score = 48, NOSE score = 55%, ETDQ-7 score = 3.4    Global QOL = 75%    Days missed = Less than 5      Past Medical History  He has a past medical history of Allergy; Back pain; BPH (benign prostatic hypertrophy); Degenerative disc disease; GERD (gastroesophageal reflux disease); Hyperlipidemia; Hypertension; Kidney stones; and Meniere disease.    Past Surgical History  He has a past surgical history that includes Back surgery; Colonoscopy (N/A, 4/7/2016); Spine surgery; and Eye surgery (Bilateral).    Family History  His family history includes Anuerysm in his brother; Cancer in his maternal uncle; Heart disease in his father, mother, and sister; Hypertension in his father; Kidney disease in his mother; No Known Problems in his sister,  "sister, and son; Parkinsonism in his father.    Social History  He reports that he has never smoked. He has never used smokeless tobacco. He reports that he does not drink alcohol or use drugs.    Allergies  He is allergic to 1,3-butylene glycol; ciprofloxacin; and levaquin [levofloxacin].    Medications   He has a current medication list which includes the following prescription(s): allopurinol, benazepril, ezetimibe, fenofibrate, multivitamin, coenzyme q10, amoxicillin-clavulanate 875-125mg, and fluticasone.    Review of Systems  Review of Systems   Constitutional: Negative for fatigue, fever and unexpected weight change.   HENT: Positive for nosebleeds, postnasal drip, rhinorrhea, sinus pressure and tinnitus. Negative for congestion, dental problem, ear discharge, ear pain, facial swelling, hearing loss, hoarse voice, sore throat, trouble swallowing and voice change.    Eyes: Negative for photophobia, discharge, itching and visual disturbance.   Respiratory: Negative for apnea, cough, shortness of breath and wheezing.    Cardiovascular: Negative for chest pain and palpitations.   Gastrointestinal: Negative for abdominal pain, nausea and vomiting.   Endocrine: Positive for heat intolerance. Negative for cold intolerance.   Genitourinary: Negative for difficulty urinating.   Musculoskeletal: Positive for arthralgias, back pain and neck pain. Negative for myalgias.   Skin: Negative for rash.   Allergic/Immunologic: Negative for environmental allergies and food allergies.   Neurological: Negative for dizziness, seizures, syncope, weakness and headaches.   Hematological: Negative for adenopathy. Does not bruise/bleed easily.   Psychiatric/Behavioral: Negative for decreased concentration, dysphoric mood and sleep disturbance. The patient is not nervous/anxious.           Objective:     /75   Pulse 69   Ht 5' 10" (1.778 m)   Wt 101.7 kg (224 lb 3.3 oz)   BMI 32.17 kg/m²        Constitutional:   He appears " well-developed. He is cooperative. Normal speech.  No hoarse voice.      Head:  Normocephalic. Salivary glands normal.  Facial strength is normal.      Ears:    Right Ear: No drainage or tenderness. Tympanic membrane is not perforated. Tympanic membrane mobility is normal. No middle ear effusion. No decreased hearing is noted.   Left Ear: No drainage or tenderness. Tympanic membrane is not perforated. Tympanic membrane mobility is normal.  No middle ear effusion. No decreased hearing is noted.     Nose:  No mucosal edema, rhinorrhea, septal deviation or polyps. No epistaxis. Turbinates normal, no turbinate masses and no turbinate hypertrophy.  Right sinus exhibits no maxillary sinus tenderness and no frontal sinus tenderness. Left sinus exhibits no maxillary sinus tenderness and no frontal sinus tenderness.     Mouth/Throat  Oropharynx clear and moist without lesions or asymmetry and normal uvula midline. He does not have dentures. Normal dentition. No oral lesions or mucous membrane lesions. No oropharyngeal exudate or posterior oropharyngeal erythema. Mirror exam not performed due to patient tolerance.  Mirror exam not performed due to patient tolerance.      Neck:  Neck normal without thyromegaly masses, asymmetry, normal tracheal structure, crepitus, and tenderness, thyroid normal, trachea normal and no adenopathy. Normal range of motion present.     He has no cervical adenopathy.     Cardiovascular:   Regular rhythm.      Pulmonary/Chest:   Effort normal.     Psychiatric:   He has a normal mood and affect. His speech is normal and behavior is normal.     Neurological:   No cranial nerve deficit.     Skin:   No rash noted.       Procedure    Nasal endoscopy performed.  See procedure note.     Left nasal valve     Left MT     Right nasal valve     Right MT     Right middle meatus with purulence        Data Reviewed    WBC (K/uL)   Date Value   04/19/2018 5.89     Eosinophil% (%)   Date Value   04/19/2018 3.1      Eos # (K/uL)   Date Value   04/19/2018 0.2     Platelets (K/uL)   Date Value   04/19/2018 220     Glucose (mg/dL)   Date Value   04/19/2018 95     No results found for: IGE    I independently reviewed the images of the CT sinuses dated 4/20/18. Pertinent findings include partial opacification of right maxillary sinus with central hyperdensity, no bony expansion or erosion, and other sinuses clear.       Assessment:     1. Chronic rhinitis    2. Chronic maxillary sinusitis         Plan:     I had a long discussion with the patient regarding his condition and the further workup and management options.  I reassured him as to the benign nature of today's findings, which are consistent with a bacterial infection likely with residual concretions from a prior infection.  I prescribed a therapeutic course of Augmentin for an additional 14 days.  I prescribed the daily use of Flonase to treat sinonasal inflammation.    Follow-up if symptoms worsen or fail to improve.

## 2018-05-24 NOTE — PROCEDURES
Nasal/sinus endoscopy  Date/Time: 5/24/2018 10:22 AM  Performed by: FLORINA HOWARD  Authorized by: FLORINA HOWARD     Consent Done?:  Yes (Verbal)  Anesthesia:     Local anesthetic:  Lidocaine 4% and Milo-Synephrine 1/2%    Patient tolerance:  Patient tolerated the procedure well with no immediate complications  Nose:     Procedure Performed:  Nasal Endoscopy  External:      No external nasal deformity  Intranasal:      Mucosa no polyps     Mucosa ulcers not present     No mucosa lesions present     Turbinates not enlarged     Septum gross deformity  Nasopharynx:      No mucosa lesions     Adenoids not present     Posterior choanae patent     Eustachian tube patent     Left side clear.  Purulence in right middle meatus.  No polyps.

## 2018-05-24 NOTE — LETTER
May 24, 2018    Mraky Mills MD  [address] [place] [address]           OTOLARYNGOLOGY AND COMMUNICATION SCIENCES    Jayjay Lund MD, FACS          SURGICAL AND MEDICAL DISEASES OF HEAD AND NECK  MD Jayjay Kurtz MD, FACS  Moo Chao III, MD    OTOLOGY, NEUROTOLOGY and SKULL-BASE SURGERY  Moses Malcolm MD, FACS  Mike Lowery MD, Director    PEDIATRIC OTOLARYNGOLOGY & OTOLOGY  OSWALDO Mays MD, FAAP  Piter Moore MD, FACS    FACIAL PLASTIC and RECONSTRUCTIVE SURGERY  SONIA Cunha III, MD, FACS    RHINOLOGY and SINUS SURGERY  Calderon Street MD, MPH, FACS  Director   SONIA Cunha III, MD, FACS    LARYNGOLOGY  Carlos Machado MD    SPEECH-LANGUAGE PATHOLOGY  Jacqui Lewis, PhD, [facility]-SLP  Kelsie Hickman, MS, CCC-SLP  Rosanna Madrigal, MS, CCC-SLP  Rukhsana Hammond MA, [facility]-SLP    AUDIOLOGY SECTION  Rachel Gandhi, MS, CCC-A  POORNIMA Siegel, CCC-A  Grace Sosa, Tk, CCC-A  Tk Maciel, CCC-A  Clemente Sánchez Jr., POORNIMA, CCA-A  POORNIMA Gonzalez, CCC-A  Tk Jovel, CCC-A    [facility]  Head and Neck Surgical Oncology  EMMA Henley, FNP-C  Pedatric Otolaryngology  EMMA Toney, PNP-C       Re:  Demarco Means  :  1955    Dear Dr. Mills,    Thank you for referring your patient, Demarco Means, to us for evaluation and treatment.  I have enclosed a copy of my clinic note for your review and records.  If you have any questions please do not hesitate to contact our office.     Thank you for allowing me to participate in the care of your patient.    Sincerely,        Calderon Street MD, MPH, FACS    Director, Rhinology and Sinus Surgery  Department of Otorhinolaryngology  [facility]    Encl:  Progress note       [facility] [address] [place] [address]  phone [phone]  fax [phone]   [email or web address]

## 2018-06-14 ENCOUNTER — OFFICE VISIT (OUTPATIENT)
Dept: INTERNAL MEDICINE | Facility: CLINIC | Age: 63
End: 2018-06-14
Payer: MEDICARE

## 2018-06-14 VITALS
WEIGHT: 224 LBS | HEART RATE: 84 BPM | OXYGEN SATURATION: 96 % | BODY MASS INDEX: 32.07 KG/M2 | SYSTOLIC BLOOD PRESSURE: 136 MMHG | HEIGHT: 70 IN | DIASTOLIC BLOOD PRESSURE: 82 MMHG

## 2018-06-14 DIAGNOSIS — I10 ESSENTIAL HYPERTENSION: ICD-10-CM

## 2018-06-14 DIAGNOSIS — N18.2 CHRONIC KIDNEY DISEASE, STAGE II (MILD): ICD-10-CM

## 2018-06-14 DIAGNOSIS — I88.9 SUBMANDIBULAR LYMPHADENITIS: ICD-10-CM

## 2018-06-14 DIAGNOSIS — J31.0 CHRONIC RHINITIS: ICD-10-CM

## 2018-06-14 DIAGNOSIS — J04.0 LARYNGITIS: Primary | ICD-10-CM

## 2018-06-14 PROCEDURE — 3075F SYST BP GE 130 - 139MM HG: CPT | Mod: CPTII,S$GLB,, | Performed by: INTERNAL MEDICINE

## 2018-06-14 PROCEDURE — 99214 OFFICE O/P EST MOD 30 MIN: CPT | Mod: S$GLB,,, | Performed by: INTERNAL MEDICINE

## 2018-06-14 PROCEDURE — 3008F BODY MASS INDEX DOCD: CPT | Mod: CPTII,S$GLB,, | Performed by: INTERNAL MEDICINE

## 2018-06-14 PROCEDURE — 99999 PR PBB SHADOW E&M-EST. PATIENT-LVL III: CPT | Mod: PBBFAC,,, | Performed by: INTERNAL MEDICINE

## 2018-06-14 PROCEDURE — 3079F DIAST BP 80-89 MM HG: CPT | Mod: CPTII,S$GLB,, | Performed by: INTERNAL MEDICINE

## 2018-06-14 RX ORDER — METHYLPREDNISOLONE 4 MG/1
TABLET ORAL
Qty: 1 PACKAGE | Refills: 0 | Status: SHIPPED | OUTPATIENT
Start: 2018-06-14 | End: 2018-08-20 | Stop reason: CLARIF

## 2018-06-14 RX ORDER — BENAZEPRIL HYDROCHLORIDE 40 MG/1
40 TABLET ORAL DAILY
Qty: 90 TABLET | Refills: 3 | Status: SHIPPED | OUTPATIENT
Start: 2018-06-14 | End: 2019-05-03 | Stop reason: SDUPTHER

## 2018-06-14 RX ORDER — OMEPRAZOLE 40 MG/1
40 CAPSULE, DELAYED RELEASE ORAL EVERY MORNING
Qty: 30 CAPSULE | Refills: 0 | Status: SHIPPED | OUTPATIENT
Start: 2018-06-14 | End: 2018-08-20

## 2018-06-14 NOTE — PROGRESS NOTES
REASON FOR VISIT:  This is a 63-year-old male.  This morning when he was eating   an egg, it felt like it got stuck on the right side of inside his neck, and he   had to drink water and eat some more before it resolved and he has felt   discomfort, but he mentions that it is here for a while.  He has been feeling   this abnormal discomfort or abnormal feeling on that side.  He has been recently   seen by ENT for chronic rhinitis and sinusitis and states that he always tends   to build up mucus and mucoid mucus in his throat, needs to clear and spit up his   throat.  He is not having indigestion or heartburn, although at night he tries   not to eat late at night, so he can avoid any regurgitation.    PAST MEDICAL HISTORY:  Hypertension.  Hyperlipidemia.  Chronic kidney disease, stage II.    MEDICATIONS:  List per MedCard.    PHYSICAL EXAMINATION:  VITAL SIGNS:  Per Epic.  Blood pressure reading 136/82.  LUNGS:  Clear.  HEART:  Regular rate and rhythm.  HEENT:  Tympanic membranes and nasal mucosa is clear.  Oropharynx, no lesions.  NECK:  I can palpate a slightly tender right submandibular lymph node.    IMPRESSION:  1.  Laryngitis.  2.  Submandibular lymphadenitis.  3.  Chronic rhinitis.  4.  Possible LPR.    PLAN:  For six days Medrol Dosepak and for month as a trial Prilosec 40 mg in   the morning and ranitidine 300 mg at night.  He will keep me informed.      JAM/HN  dd: 06/14/2018 15:53:42 (CDT)  td: 06/14/2018 22:17:44 (CDT)  Doc ID   #[id number]  Job ID #[id number]    CC:

## 2018-06-26 RX ORDER — FENOFIBRATE 160 MG/1
TABLET ORAL
Qty: 30 TABLET | Refills: 10 | Status: SHIPPED | OUTPATIENT
Start: 2018-06-26 | End: 2019-07-11 | Stop reason: SDUPTHER

## 2018-07-03 DIAGNOSIS — N18.9 CHRONIC KIDNEY DISEASE, UNSPECIFIED CKD STAGE: ICD-10-CM

## 2018-08-13 ENCOUNTER — LAB VISIT (OUTPATIENT)
Dept: LAB | Facility: HOSPITAL | Age: 63
End: 2018-08-13
Attending: INTERNAL MEDICINE
Payer: MEDICARE

## 2018-08-13 DIAGNOSIS — R79.89 CREATININE ELEVATION: ICD-10-CM

## 2018-08-13 DIAGNOSIS — H53.9 TRANSIENT VISION DISTURBANCE OF RIGHT EYE: ICD-10-CM

## 2018-08-13 DIAGNOSIS — E78.00 PURE HYPERCHOLESTEROLEMIA: ICD-10-CM

## 2018-08-13 DIAGNOSIS — Z12.5 ENCOUNTER FOR SCREENING FOR MALIGNANT NEOPLASM OF PROSTATE: ICD-10-CM

## 2018-08-13 DIAGNOSIS — I10 ESSENTIAL HYPERTENSION: ICD-10-CM

## 2018-08-13 DIAGNOSIS — J32.0 CHRONIC MAXILLARY SINUSITIS: ICD-10-CM

## 2018-08-13 LAB
ALBUMIN SERPL BCP-MCNC: 4 G/DL
ALP SERPL-CCNC: 42 U/L
ALT SERPL W/O P-5'-P-CCNC: 32 U/L
ANION GAP SERPL CALC-SCNC: 11 MMOL/L
AST SERPL-CCNC: 23 U/L
BASOPHILS # BLD AUTO: 0.05 K/UL
BASOPHILS NFR BLD: 0.8 %
BILIRUB SERPL-MCNC: 0.3 MG/DL
BUN SERPL-MCNC: 21 MG/DL
CALCIUM SERPL-MCNC: 9.7 MG/DL
CHLORIDE SERPL-SCNC: 105 MMOL/L
CHOLEST SERPL-MCNC: 190 MG/DL
CHOLEST/HDLC SERPL: 5.1 {RATIO}
CO2 SERPL-SCNC: 23 MMOL/L
COMPLEXED PSA SERPL-MCNC: 0.68 NG/ML
CREAT SERPL-MCNC: 1.5 MG/DL
DIFFERENTIAL METHOD: ABNORMAL
EOSINOPHIL # BLD AUTO: 0.2 K/UL
EOSINOPHIL NFR BLD: 2.7 %
ERYTHROCYTE [DISTWIDTH] IN BLOOD BY AUTOMATED COUNT: 12.3 %
EST. GFR  (AFRICAN AMERICAN): 55.7 ML/MIN/1.73 M^2
EST. GFR  (NON AFRICAN AMERICAN): 48.2 ML/MIN/1.73 M^2
GLUCOSE SERPL-MCNC: 102 MG/DL
HCT VFR BLD AUTO: 39.8 %
HDLC SERPL-MCNC: 37 MG/DL
HDLC SERPL: 19.5 %
HGB BLD-MCNC: 12.6 G/DL
IMM GRANULOCYTES # BLD AUTO: 0.03 K/UL
IMM GRANULOCYTES NFR BLD AUTO: 0.5 %
LDLC SERPL CALC-MCNC: 121.8 MG/DL
LYMPHOCYTES # BLD AUTO: 2.8 K/UL
LYMPHOCYTES NFR BLD: 47.1 %
MCH RBC QN AUTO: 30.1 PG
MCHC RBC AUTO-ENTMCNC: 31.7 G/DL
MCV RBC AUTO: 95 FL
MONOCYTES # BLD AUTO: 0.6 K/UL
MONOCYTES NFR BLD: 10.1 %
NEUTROPHILS # BLD AUTO: 2.3 K/UL
NEUTROPHILS NFR BLD: 38.8 %
NONHDLC SERPL-MCNC: 153 MG/DL
NRBC BLD-RTO: 0 /100 WBC
PLATELET # BLD AUTO: 243 K/UL
PMV BLD AUTO: 9.6 FL
POTASSIUM SERPL-SCNC: 4.3 MMOL/L
PROT SERPL-MCNC: 7.4 G/DL
RBC # BLD AUTO: 4.19 M/UL
SODIUM SERPL-SCNC: 139 MMOL/L
TRIGL SERPL-MCNC: 156 MG/DL
TSH SERPL DL<=0.005 MIU/L-ACNC: 1.53 UIU/ML
URATE SERPL-MCNC: 4.8 MG/DL
WBC # BLD AUTO: 5.96 K/UL

## 2018-08-13 PROCEDURE — 85025 COMPLETE CBC W/AUTO DIFF WBC: CPT

## 2018-08-13 PROCEDURE — 84443 ASSAY THYROID STIM HORMONE: CPT

## 2018-08-13 PROCEDURE — 84550 ASSAY OF BLOOD/URIC ACID: CPT

## 2018-08-13 PROCEDURE — 84153 ASSAY OF PSA TOTAL: CPT

## 2018-08-13 PROCEDURE — 80053 COMPREHEN METABOLIC PANEL: CPT

## 2018-08-13 PROCEDURE — 36415 COLL VENOUS BLD VENIPUNCTURE: CPT | Mod: PO

## 2018-08-13 PROCEDURE — 80061 LIPID PANEL: CPT

## 2018-08-20 ENCOUNTER — OFFICE VISIT (OUTPATIENT)
Dept: INTERNAL MEDICINE | Facility: CLINIC | Age: 63
End: 2018-08-20
Payer: MEDICARE

## 2018-08-20 VITALS
OXYGEN SATURATION: 97 % | BODY MASS INDEX: 31.5 KG/M2 | DIASTOLIC BLOOD PRESSURE: 75 MMHG | HEIGHT: 70 IN | HEART RATE: 74 BPM | SYSTOLIC BLOOD PRESSURE: 128 MMHG | WEIGHT: 220 LBS

## 2018-08-20 DIAGNOSIS — N18.9 CHRONIC KIDNEY DISEASE, UNSPECIFIED CKD STAGE: ICD-10-CM

## 2018-08-20 DIAGNOSIS — E78.00 PURE HYPERCHOLESTEROLEMIA: ICD-10-CM

## 2018-08-20 DIAGNOSIS — Z00.00 ANNUAL PHYSICAL EXAM: Primary | ICD-10-CM

## 2018-08-20 DIAGNOSIS — I10 ESSENTIAL HYPERTENSION: ICD-10-CM

## 2018-08-20 DIAGNOSIS — J04.0 LARYNGITIS: ICD-10-CM

## 2018-08-20 DIAGNOSIS — M47.27 OSTEOARTHRITIS OF SPINE WITH RADICULOPATHY, LUMBOSACRAL REGION: ICD-10-CM

## 2018-08-20 PROCEDURE — 3074F SYST BP LT 130 MM HG: CPT | Mod: CPTII,S$GLB,, | Performed by: INTERNAL MEDICINE

## 2018-08-20 PROCEDURE — 90670 PCV13 VACCINE IM: CPT | Mod: S$GLB,,, | Performed by: INTERNAL MEDICINE

## 2018-08-20 PROCEDURE — G0009 ADMIN PNEUMOCOCCAL VACCINE: HCPCS | Mod: S$GLB,,, | Performed by: INTERNAL MEDICINE

## 2018-08-20 PROCEDURE — 99397 PER PM REEVAL EST PAT 65+ YR: CPT | Mod: 25,S$GLB,, | Performed by: INTERNAL MEDICINE

## 2018-08-20 PROCEDURE — 99999 PR PBB SHADOW E&M-EST. PATIENT-LVL IV: CPT | Mod: PBBFAC,,, | Performed by: INTERNAL MEDICINE

## 2018-08-20 PROCEDURE — 3078F DIAST BP <80 MM HG: CPT | Mod: CPTII,S$GLB,, | Performed by: INTERNAL MEDICINE

## 2018-08-20 RX ORDER — OMEPRAZOLE 20 MG/1
20 CAPSULE, DELAYED RELEASE ORAL 2 TIMES DAILY
Qty: 30 CAPSULE | Refills: 0 | Status: SHIPPED | OUTPATIENT
Start: 2018-08-20 | End: 2019-01-03

## 2018-08-20 NOTE — PROGRESS NOTES
REASON FOR VISIT:  This is a 63-year-old male who presents for annual routine   visit.    He is having a similar situation that he had back in  two weeks ago.  He   started developing an irritation in his throat more deep into his anterior neck.    He was aware of a postnasal drip.  On occasion, he was also aware of having   some heartburn.  He was near his grandson who was also sick.  On his visit with   me in , he responded well to a course of Medrol Dosepak, but was also taking   Protonix and Zantac for one month and after that everything resolved.    PAST MEDICAL HISTORY:  Hypertension.  Hyperlipidemia.  BPH.  Lumbar degenerative disc disease with previous back surgeries.  Meniere's disease.  Gastroesophageal reflux disease.  Nephrolithiasis.  Prostatitis.  Adenomatous colon polyp in .  Chronic kidney disease, stage II-III.    SOCIAL HISTORY:  Tobacco and alcohol use -- none.  , has one living son.    One son is .  Works intermittently as a .    FAMILY HISTORY:  Father is , hypertension, Parkinson's disease.  Mother   is , heart disease.  Three sisters, all living, one sister with heart   disease.  Two brothers , one from an aneurysm, one from drug use.    SCREENING:  Colonoscopy in 2016, hyperplastic polyp.    MEDICATIONS:  Allopurinol 100 mg daily.  Benazepril 40 mg daily.  Zetia 10 mg daily.  Fenofibrate 160 mg daily.  Flonase as needed.    REVIEW OF SYMPTOMS:  No other chest pain, shortness of breath, or abdominal   pain.  Regular bowel function.  No difficulty urinating.  Nocturia x1.  No   headaches.  Still has chronic arthralgias involving the back of the legs with   prolonged walking.    RECENT LABS:  Chemistry normal except creatinine 1.5.  Cholesterol profile is   better 190 with triglycerides 156.  TSH, uric acid, normal.  PSA 0.68.    Hemoglobin and hematocrit, 12.6 and 39.8, which is stable.    PHYSICAL EXAMINATION:  VITAL SIGNS:   Weight is 220 pounds, pulse 72, blood pressure 110/70.  HEENT:  Tympanic membranes normal.  Nasal mucosa is clear.  Oropharynx, no   abnormal findings.  NECK:  No thyromegaly.  LUNGS:  Clear.  HEART:  Regular rate and rhythm.  ABDOMEN:  Active bowel sounds, soft, nontender.  No hepatosplenomegaly or   abdominal masses.  PULSES:  2+ carotid, 2+ pedal pulses.  EXTREMITIES:  No edema.  LYMPH GLAND:  No palpable adenopathy.  GENITALIA:  No scrotal masses, no hernias.  RECTAL:  Stool is brown.  Prostate is mildly enlarged.    There is a comment regarding creatinine and kidney function.  He is a very   muscular person.  An ultrasound in September 2017 actually showed no evidence of   chronic medical disease.    IMPRESSION:  1.  General examination.  2.  Hypertension.  3.  Hyperlipidemia.  4.  Lumbar degenerative disc disease.  5.  Throat discomfort, which may be related to LPR.    PLAN:  If he is still having postnasal drip, he can take Zyrtec.  In the   meantime instead of a Medrol Dosepak, we will just give a trial of Prilosec 20   mg one twice a day for two weeks.  I would like for him to give me a report in   two weeks to let me know how he is doing.  Set up a return in six months.        /ls [id number] review        AILEEN/ELIZABETH  dd: 08/20/2018 09:08:51 (CDT)  td: 08/20/2018 14:58:59 (CDT)  Doc ID   #[id number]  Job ID #[id number]    CC:

## 2018-08-23 ENCOUNTER — PES CALL (OUTPATIENT)
Dept: ADMINISTRATIVE | Facility: CLINIC | Age: 63
End: 2018-08-23

## 2019-01-03 ENCOUNTER — DOCUMENTATION ONLY (OUTPATIENT)
Dept: INTERNAL MEDICINE | Facility: CLINIC | Age: 64
End: 2019-01-03

## 2019-01-03 ENCOUNTER — OFFICE VISIT (OUTPATIENT)
Dept: INTERNAL MEDICINE | Facility: CLINIC | Age: 64
End: 2019-01-03
Payer: MEDICARE

## 2019-01-03 VITALS
BODY MASS INDEX: 31.5 KG/M2 | HEIGHT: 70 IN | WEIGHT: 220 LBS | HEART RATE: 72 BPM | SYSTOLIC BLOOD PRESSURE: 127 MMHG | OXYGEN SATURATION: 98 % | DIASTOLIC BLOOD PRESSURE: 77 MMHG

## 2019-01-03 DIAGNOSIS — H65.91 SOM (SECRETORY OTITIS MEDIA), RIGHT: Primary | ICD-10-CM

## 2019-01-03 PROCEDURE — 3078F PR MOST RECENT DIASTOLIC BLOOD PRESSURE < 80 MM HG: ICD-10-PCS | Mod: CPTII,HCNC,S$GLB, | Performed by: INTERNAL MEDICINE

## 2019-01-03 PROCEDURE — 96372 PR INJECTION,THERAP/PROPH/DIAG2ST, IM OR SUBCUT: ICD-10-PCS | Mod: HCNC,S$GLB,, | Performed by: INTERNAL MEDICINE

## 2019-01-03 PROCEDURE — 99999 PR PBB SHADOW E&M-EST. PATIENT-LVL III: CPT | Mod: PBBFAC,HCNC,, | Performed by: INTERNAL MEDICINE

## 2019-01-03 PROCEDURE — 3074F PR MOST RECENT SYSTOLIC BLOOD PRESSURE < 130 MM HG: ICD-10-PCS | Mod: CPTII,HCNC,S$GLB, | Performed by: INTERNAL MEDICINE

## 2019-01-03 PROCEDURE — 3008F PR BODY MASS INDEX (BMI) DOCUMENTED: ICD-10-PCS | Mod: CPTII,HCNC,S$GLB, | Performed by: INTERNAL MEDICINE

## 2019-01-03 PROCEDURE — 3008F BODY MASS INDEX DOCD: CPT | Mod: CPTII,HCNC,S$GLB, | Performed by: INTERNAL MEDICINE

## 2019-01-03 PROCEDURE — 3074F SYST BP LT 130 MM HG: CPT | Mod: CPTII,HCNC,S$GLB, | Performed by: INTERNAL MEDICINE

## 2019-01-03 PROCEDURE — 96372 THER/PROPH/DIAG INJ SC/IM: CPT | Mod: HCNC,S$GLB,, | Performed by: INTERNAL MEDICINE

## 2019-01-03 PROCEDURE — 99999 PR PBB SHADOW E&M-EST. PATIENT-LVL III: ICD-10-PCS | Mod: PBBFAC,HCNC,, | Performed by: INTERNAL MEDICINE

## 2019-01-03 PROCEDURE — 1101F PR PT FALLS ASSESS DOC 0-1 FALLS W/OUT INJ PAST YR: ICD-10-PCS | Mod: CPTII,HCNC,S$GLB, | Performed by: INTERNAL MEDICINE

## 2019-01-03 PROCEDURE — 99213 OFFICE O/P EST LOW 20 MIN: CPT | Mod: HCNC,25,S$GLB, | Performed by: INTERNAL MEDICINE

## 2019-01-03 PROCEDURE — 3078F DIAST BP <80 MM HG: CPT | Mod: CPTII,HCNC,S$GLB, | Performed by: INTERNAL MEDICINE

## 2019-01-03 PROCEDURE — 99213 PR OFFICE/OUTPT VISIT, EST, LEVL III, 20-29 MIN: ICD-10-PCS | Mod: HCNC,25,S$GLB, | Performed by: INTERNAL MEDICINE

## 2019-01-03 PROCEDURE — 1101F PT FALLS ASSESS-DOCD LE1/YR: CPT | Mod: CPTII,HCNC,S$GLB, | Performed by: INTERNAL MEDICINE

## 2019-01-03 RX ORDER — BETAMETHASONE SODIUM PHOSPHATE AND BETAMETHASONE ACETATE 3; 3 MG/ML; MG/ML
12 INJECTION, SUSPENSION INTRA-ARTICULAR; INTRALESIONAL; INTRAMUSCULAR; SOFT TISSUE ONCE
Status: COMPLETED | OUTPATIENT
Start: 2019-01-03 | End: 2019-01-03

## 2019-01-03 RX ADMIN — BETAMETHASONE SODIUM PHOSPHATE AND BETAMETHASONE ACETATE 12 MG: 3; 3 INJECTION, SUSPENSION INTRA-ARTICULAR; INTRALESIONAL; INTRAMUSCULAR; SOFT TISSUE at 02:01

## 2019-01-03 NOTE — PROGRESS NOTES
REASON FOR APPOINTMENT:  This is a 63-year-old male who two days ago started   feeling pain around inside his right ear.  He is aware of a drip on the right   side of the back of his throat and feels irritated deep in the neck.  No fever   or chills.  No nasal or head congestion.  On occasion might blow out clear   mucus.    PAST MEDICAL HISTORY:  1.  Hypertension.  2.  Hyperlipidemia.  3.  Chronic kidney disease stage II to III.  4.  Hyperuric acid.    MEDICATIONS:  List per MedCard.    PHYSICAL EXAMINATION:  VITAL SIGNS:  Weight is 220 pounds, pulse 72, blood pressure 127/77.  HEENT:  There is a very hard wax buildup.  Not able to remove inside the left   ear canal.  The right ear, ear canal looks fine.  Tympanic membranes, slightly   opaque.  Nasal mucosa is clear.  Oropharynx, no abnormal findings.  NECK:  There is a slight tender right submandibular lymph gland.  LUNGS:  Clear.  HEART:  Regular rate and rhythm.    IMPRESSION:  Serous otitis media with acute rhinitis.    PLAN:  Celestone 12 mg and take over-the-counter Zyrtec and Allegra and today we   will be attempting to flush out the left ear.        /rosy [id number] review        AILEEN/ELIZABETH  dd: 01/03/2019 14:47:12 (CST)  td: 01/04/2019 01:20:48 (CST)  Doc ID   #[id number]  Job ID #[id number]    CC:

## 2019-01-03 NOTE — PROGRESS NOTES
Patient recently had a lipid panel from [facility]    Total cholesterol 184  HDL cholesterol 43  LDL cholesterol 107  Triglycerides 173  Blood glucose 102

## 2019-01-23 ENCOUNTER — TELEPHONE (OUTPATIENT)
Dept: INTERNAL MEDICINE | Facility: CLINIC | Age: 64
End: 2019-01-23

## 2019-01-23 NOTE — TELEPHONE ENCOUNTER
----- Message from Lillian Wellington sent at 1/23/2019  7:00 AM CST -----  Contact: Patient [phone]  Patient is interested in doing the Metamucil 14 day cleanse and wants to know if it is ok and what do you think about it.    Please call and advise.    Thank You

## 2019-01-23 NOTE — TELEPHONE ENCOUNTER
Patient is interested in doing the Metamucil 14 day cleanse and wants to know if it is ok and what do you think about it.

## 2019-03-19 ENCOUNTER — OFFICE VISIT (OUTPATIENT)
Dept: INTERNAL MEDICINE | Facility: CLINIC | Age: 64
End: 2019-03-19
Payer: MEDICARE

## 2019-03-19 VITALS
WEIGHT: 228.63 LBS | HEIGHT: 70 IN | OXYGEN SATURATION: 95 % | BODY MASS INDEX: 32.73 KG/M2 | HEART RATE: 78 BPM | DIASTOLIC BLOOD PRESSURE: 70 MMHG | SYSTOLIC BLOOD PRESSURE: 110 MMHG

## 2019-03-19 DIAGNOSIS — N40.0 BENIGN PROSTATIC HYPERPLASIA WITHOUT LOWER URINARY TRACT SYMPTOMS: ICD-10-CM

## 2019-03-19 DIAGNOSIS — M54.50 CHRONIC LOW BACK PAIN WITHOUT SCIATICA, UNSPECIFIED BACK PAIN LATERALITY: ICD-10-CM

## 2019-03-19 DIAGNOSIS — Z78.9 STATIN INTOLERANCE: ICD-10-CM

## 2019-03-19 DIAGNOSIS — E78.00 PURE HYPERCHOLESTEROLEMIA: ICD-10-CM

## 2019-03-19 DIAGNOSIS — G89.29 CHRONIC LOW BACK PAIN WITHOUT SCIATICA, UNSPECIFIED BACK PAIN LATERALITY: ICD-10-CM

## 2019-03-19 DIAGNOSIS — I70.0 AORTIC ATHEROSCLEROSIS: ICD-10-CM

## 2019-03-19 DIAGNOSIS — Z82.49 FAMILY HISTORY OF EARLY CAD: ICD-10-CM

## 2019-03-19 DIAGNOSIS — I10 ESSENTIAL HYPERTENSION: ICD-10-CM

## 2019-03-19 DIAGNOSIS — Z01.00 ROUTINE EYE EXAM: ICD-10-CM

## 2019-03-19 DIAGNOSIS — N18.30 STAGE 3 CHRONIC KIDNEY DISEASE: ICD-10-CM

## 2019-03-19 DIAGNOSIS — K21.9 GASTROESOPHAGEAL REFLUX DISEASE, ESOPHAGITIS PRESENCE NOT SPECIFIED: ICD-10-CM

## 2019-03-19 DIAGNOSIS — H81.03 MENIERE'S DISEASE OF BOTH EARS: ICD-10-CM

## 2019-03-19 DIAGNOSIS — Z00.00 ENCOUNTER FOR PREVENTIVE HEALTH EXAMINATION: Primary | ICD-10-CM

## 2019-03-19 PROCEDURE — 99999 PR PBB SHADOW E&M-EST. PATIENT-LVL IV: CPT | Mod: PBBFAC,HCNC,, | Performed by: NURSE PRACTITIONER

## 2019-03-19 PROCEDURE — 3078F DIAST BP <80 MM HG: CPT | Mod: HCNC,CPTII,S$GLB, | Performed by: NURSE PRACTITIONER

## 2019-03-19 PROCEDURE — G0439 PR MEDICARE ANNUAL WELLNESS SUBSEQUENT VISIT: ICD-10-PCS | Mod: HCNC,S$GLB,, | Performed by: NURSE PRACTITIONER

## 2019-03-19 PROCEDURE — 3078F PR MOST RECENT DIASTOLIC BLOOD PRESSURE < 80 MM HG: ICD-10-PCS | Mod: HCNC,CPTII,S$GLB, | Performed by: NURSE PRACTITIONER

## 2019-03-19 PROCEDURE — 3074F PR MOST RECENT SYSTOLIC BLOOD PRESSURE < 130 MM HG: ICD-10-PCS | Mod: HCNC,CPTII,S$GLB, | Performed by: NURSE PRACTITIONER

## 2019-03-19 PROCEDURE — 99999 PR PBB SHADOW E&M-EST. PATIENT-LVL IV: ICD-10-PCS | Mod: PBBFAC,HCNC,, | Performed by: NURSE PRACTITIONER

## 2019-03-19 PROCEDURE — 3074F SYST BP LT 130 MM HG: CPT | Mod: HCNC,CPTII,S$GLB, | Performed by: NURSE PRACTITIONER

## 2019-03-19 PROCEDURE — G0439 PPPS, SUBSEQ VISIT: HCPCS | Mod: HCNC,S$GLB,, | Performed by: NURSE PRACTITIONER

## 2019-03-19 NOTE — PROGRESS NOTES
"Demarco Means presented for a  Medicare AWV and comprehensive Health Risk Assessment today. The following components were reviewed and updated:    · Medical history  · Family History  · Social history  · Allergies and Current Medications  · Health Risk Assessment  · Health Maintenance  · Care Team     ** See Completed Assessments for Annual Wellness Visit within the encounter summary.**       The following assessments were completed:  · Living Situation  · CAGE  · Depression Screening  · Timed Get Up and Go  · Whisper Test  · Cognitive Function Screening  ·   ·   · Nutrition Screening  · ADL Screening  · PAQ Screening    Vitals:    03/19/19 0957   BP: 110/70   Pulse: 78   SpO2: 95%   Weight: 103.7 kg (228 lb 9.9 oz)   Height: 5' 10" (1.778 m)     Body mass index is 32.8 kg/m².  Physical Exam   Constitutional: He is oriented to person, place, and time. He appears well-developed and well-nourished.   Muscular build   HENT:   Head: Normocephalic and atraumatic.   Nose: Nose normal.   Eyes: Conjunctivae and EOM are normal.   Cardiovascular: Normal rate, regular rhythm, normal heart sounds and intact distal pulses.   Pulmonary/Chest: Effort normal and breath sounds normal.   Musculoskeletal: Normal range of motion.   Neurological: He is alert and oriented to person, place, and time.   Skin: Skin is warm and dry.   Psychiatric: He has a normal mood and affect. His behavior is normal. Judgment and thought content normal.   Nursing note and vitals reviewed.        Diagnoses and health risks identified today and associated recommendations/orders:    1. Encounter for preventive health examination  Chronic. Stable with current regimen. Followed by PCP.  Discussed immunizations due, would like PCP to recommend prior to receiving.    2. Stage 3 chronic kidney disease  Chronic. Stable. Followed by PCP.  Component      Latest Ref Rng & Units 8/13/2018   Creatinine      0.5 - 1.4 mg/dL 1.5 (H)       3. Aortic " atherosclerosis  Noted on imaging. Stable with blood pressure and lipid control. Followed by PCP.    4. Essential hypertension  Chronic. Stable with current regimen. Followed by PCP.    5. Pure hypercholesterolemia  Chronic. Stable with current regimen. Statin intolerance. Followed by PCP.  Component      Latest Ref Rng & Units 8/13/2018   Cholesterol      120 - 199 mg/dL 190   Triglycerides      30 - 150 mg/dL 156 (H)   HDL      40 - 75 mg/dL 37 (L)   LDL Cholesterol      63.0 - 159.0 mg/dL 121.8   HDL/Chol Ratio      20.0 - 50.0 % 19.5 (L)   Total Cholesterol/HDL Ratio      2.0 - 5.0 5.1 (H)   Non-HDL Cholesterol      mg/dL 153     6. Benign prostatic hyperplasia without lower urinary tract symptoms  Chronic. Stable. Followed by PCP  Seen by Urology  2016.    7. Gastroesophageal reflux disease, esophagitis presence not specified  Chronic. Stable with OTCs. Followed by PCP.    8. Meniere's disease of both ears  Chronic. Stable. Followed by ENT at [facility].    9. Chronic low back pain without sciatica, unspecified back pain laterality  Chronic. Sees chiropractor.  Followed by PCP.    10. Routine eye exam  - Ambulatory Referral to Optometry    11. Family history of early CAD  Stable.    12. Statin intolerance  On Zetia and Fenofibrate. Chronic. Stable. Followed by PCP.      Provided Demarco Moseley with a 5-10 year written screening schedule and personal prevention plan. Recommendations were developed using the USPSTF age appropriate recommendations. Education, counseling, and referrals were provided as needed. After Visit Summary printed and given to patient which includes a list of additional screenings\tests needed.    Follow-up for Annual Wellness Visit in 1 year, follow up with Primary Care Provider as instructed, ;sooner if prob.    MARK Saldivar

## 2019-03-19 NOTE — PATIENT INSTRUCTIONS
Counseling and Referral of Other Preventative  (Italic type indicates deductible and co-insurance are waived)    Patient Name: Demarco Means  Today's Date: 3/19/2019    Health Maintenance       Date Due Completion Date    TETANUS VACCINE 02/15/1971 ---    High Dose Statin 02/15/1974 ---    Zoster Vaccine 02/15/2013 ---    Influenza Vaccine 08/01/2018 ---    Pneumococcal Vaccine (65+ Low/Medium Risk) (2 of 2 - PPSV23) 08/20/2019 8/20/2018    Lipid Panel 08/13/2023 8/13/2018    Colonoscopy 04/07/2026 4/7/2016        Orders Placed This Encounter   Procedures    Ambulatory Referral to Optometry     The following information is provided to all patients.  This information is to help you find resources for any of the problems found today that may be affecting your health:                Living healthy guide: www.[facility].[place].gov      Understanding Diabetes: www.diabetes.org      Eating healthy: www.cdc.gov/healthyweight      CDC home safety checklist: www.cdc.gov/steadi/patient.html      Agency on Aging: www.goea.[place].[facility]      Alcoholics anonymous (AA): www.aa.org      Physical Activity: www.angel.nih.gov/fd9rmca      Tobacco use: www.quitwithusla.org

## 2019-04-10 ENCOUNTER — INITIAL CONSULT (OUTPATIENT)
Dept: OPTOMETRY | Facility: CLINIC | Age: 64
End: 2019-04-10
Payer: COMMERCIAL

## 2019-04-10 DIAGNOSIS — H52.203 MYOPIA WITH ASTIGMATISM AND PRESBYOPIA, BILATERAL: Primary | ICD-10-CM

## 2019-04-10 DIAGNOSIS — H52.13 MYOPIA WITH ASTIGMATISM AND PRESBYOPIA, BILATERAL: Primary | ICD-10-CM

## 2019-04-10 DIAGNOSIS — Z96.1 PSEUDOPHAKIA: ICD-10-CM

## 2019-04-10 DIAGNOSIS — H52.4 MYOPIA WITH ASTIGMATISM AND PRESBYOPIA, BILATERAL: Primary | ICD-10-CM

## 2019-04-10 DIAGNOSIS — H40.013 OAG (OPEN ANGLE GLAUCOMA) SUSPECT, LOW RISK, BILATERAL: ICD-10-CM

## 2019-04-10 PROCEDURE — 92015 DETERMINE REFRACTIVE STATE: CPT | Mod: S$GLB,,, | Performed by: OPTOMETRIST

## 2019-04-10 PROCEDURE — 92004 COMPRE OPH EXAM NEW PT 1/>: CPT | Mod: S$GLB,,, | Performed by: OPTOMETRIST

## 2019-04-10 PROCEDURE — 99999 PR PBB SHADOW E&M-EST. PATIENT-LVL II: CPT | Mod: PBBFAC,,, | Performed by: OPTOMETRIST

## 2019-04-10 PROCEDURE — 92015 PR REFRACTION: ICD-10-PCS | Mod: S$GLB,,, | Performed by: OPTOMETRIST

## 2019-04-10 PROCEDURE — 99999 PR PBB SHADOW E&M-EST. PATIENT-LVL II: ICD-10-PCS | Mod: PBBFAC,,, | Performed by: OPTOMETRIST

## 2019-04-10 PROCEDURE — 92004 PR EYE EXAM, NEW PATIENT,COMPREHESV: ICD-10-PCS | Mod: S$GLB,,, | Performed by: OPTOMETRIST

## 2019-04-10 NOTE — LETTER
April 10, 2019      Kenia Montalvo, FNP  [address]  [place] [address]           [facility]  [address]  [place] [address]  Phone: [phone]  Fax: [phone]          Patient: Demarco Means   MR Number: [id number]   YOB: 1955   Date of Visit: 4/10/2019       Dear Kenia Montalvo:    Thank you for referring Demarco Means to me for evaluation. Attached you will find relevant portions of my assessment and plan of care.    If you have questions, please do not hesitate to call me. I look forward to following Demarco Means along with you.    Sincerely,    Barbra Fagan, OD    Enclosure  CC:  No Recipients    If you would like to receive this communication electronically, please contact [email or web address] or [phone] to request more information on [employer] access.    For providers and/or their staff who would like to refer a patient to [facility], please contact us through our one-stop-shop provider referral line, [facility], at [phone].    If you feel you have received this communication in error or would no longer like to receive these types of communications, please e-mail [email or web address]

## 2019-04-10 NOTE — PROGRESS NOTES
HPI     Pt sts sometimes his vision gets blurry. Had cataract sx about 20 yrs   ago. Some floaters no flashes. Drops none.    Last edited by Savannah Sorto on 4/10/2019  1:25 PM. (History)        ROS     Negative for: Constitutional, Gastrointestinal, Neurological, Skin,   Genitourinary, Musculoskeletal, HENT, Endocrine, Cardiovascular, Eyes,   Respiratory, Psychiatric, Allergic/Imm, Heme/Lymph    Last edited by Barbra Fagan, OD on 4/10/2019  2:07 PM. (History)        Assessment /Plan     For exam results, see Encounter Report.    Myopia with astigmatism and presbyopia, bilateral    OAG (open angle glaucoma) suspect, low risk, bilateral  -     Gutierrez Visual Field - OU - Extended - Both Eyes; Future  -     Posterior Segment OCT Optic Nerve- Both eyes; Future    Pseudophakia      1. SRx updated to improve DVA and NVA.   2. (+) FHx- mom. IOP 18 OD, OS. C/d 0.55 OD, 0.6 OS. Pt thinks he may have had a visual field test done before. Educated pt on findings. RTC 2 weeks IOP/Pachy/OcT/HVF.   3. Good result.

## 2019-04-26 ENCOUNTER — TELEPHONE (OUTPATIENT)
Dept: INTERNAL MEDICINE | Facility: CLINIC | Age: 64
End: 2019-04-26

## 2019-04-26 RX ORDER — SILDENAFIL 100 MG/1
100 TABLET, FILM COATED ORAL DAILY PRN
Qty: 10 TABLET | Refills: 2 | Status: SHIPPED | OUTPATIENT
Start: 2019-04-26

## 2019-04-26 NOTE — TELEPHONE ENCOUNTER
----- Message from Miranda Lafleur sent at 4/26/2019  8:11 AM CDT -----  Contact: [phone]  Patient is calling for an RX refill or new RX.  Is this a refill or new RX:  Refill   RX name and strength: sildenafil (VIAGRA) 100 MG tablet     Local pharmacy or mail order pharmacy:  [facility] [phone]  :     Comments:  Please advise, thanks

## 2019-04-30 ENCOUNTER — CLINICAL SUPPORT (OUTPATIENT)
Dept: OPHTHALMOLOGY | Facility: CLINIC | Age: 64
End: 2019-04-30
Payer: MEDICARE

## 2019-04-30 ENCOUNTER — OFFICE VISIT (OUTPATIENT)
Dept: OPTOMETRY | Facility: CLINIC | Age: 64
End: 2019-04-30
Payer: MEDICARE

## 2019-04-30 DIAGNOSIS — H40.013 OAG (OPEN ANGLE GLAUCOMA) SUSPECT, LOW RISK, BILATERAL: ICD-10-CM

## 2019-04-30 DIAGNOSIS — H40.1121 PRIMARY OPEN ANGLE GLAUCOMA OF LEFT EYE, MILD STAGE: ICD-10-CM

## 2019-04-30 DIAGNOSIS — H40.011 OAG (OPEN ANGLE GLAUCOMA) SUSPECT, LOW RISK, RIGHT: Primary | ICD-10-CM

## 2019-04-30 PROCEDURE — 92083 EXTENDED VISUAL FIELD XM: CPT | Mod: S$GLB,,, | Performed by: OPTOMETRIST

## 2019-04-30 PROCEDURE — 76514 ECHO EXAM OF EYE THICKNESS: CPT | Mod: S$GLB,,, | Performed by: OPTOMETRIST

## 2019-04-30 PROCEDURE — 92012 INTRM OPH EXAM EST PATIENT: CPT | Mod: S$GLB,,, | Performed by: OPTOMETRIST

## 2019-04-30 PROCEDURE — 92133 POSTERIOR SEGMENT OCT OPTIC NERVE(OCULAR COHERENCE TOMOGRAPHY) - OU - BOTH EYES: ICD-10-PCS | Mod: S$GLB,,, | Performed by: OPTOMETRIST

## 2019-04-30 PROCEDURE — 92012 PR EYE EXAM, EST PATIENT,INTERMED: ICD-10-PCS | Mod: S$GLB,,, | Performed by: OPTOMETRIST

## 2019-04-30 PROCEDURE — 92083 HUMPHREY VISUAL FIELD - OU - BOTH EYES: ICD-10-PCS | Mod: S$GLB,,, | Performed by: OPTOMETRIST

## 2019-04-30 PROCEDURE — 76514 PR  US, EYE, FOR CORNEAL THICKNESS: ICD-10-PCS | Mod: S$GLB,,, | Performed by: OPTOMETRIST

## 2019-04-30 PROCEDURE — 99999 PR PBB SHADOW E&M-EST. PATIENT-LVL II: CPT | Mod: PBBFAC,,, | Performed by: OPTOMETRIST

## 2019-04-30 PROCEDURE — 92133 CPTRZD OPH DX IMG PST SGM ON: CPT | Mod: S$GLB,,, | Performed by: OPTOMETRIST

## 2019-04-30 PROCEDURE — 99999 PR PBB SHADOW E&M-EST. PATIENT-LVL II: ICD-10-PCS | Mod: PBBFAC,,, | Performed by: OPTOMETRIST

## 2019-05-01 NOTE — PROGRESS NOTES
HPI     Pt is in for IOP ck   VF pach and OCT performed today     Last edited by Zina Jc on 4/30/2019  3:16 PM. (History)        ROS     Negative for: Constitutional, Gastrointestinal, Neurological, Skin,   Genitourinary, Musculoskeletal, HENT, Endocrine, Cardiovascular, Eyes,   Respiratory, Psychiatric, Allergic/Imm, Heme/Lymph    Last edited by Barbra Fagan, OD on 4/30/2019  3:36 PM. (History)        Assessment /Plan     For exam results, see Encounter Report.    OAG (open angle glaucoma) suspect, low risk, right    Open-angle glaucoma of left eye, mild stage, unspecified open-angle glaucoma type      1-2. (+) FHx- mom. IOP 19 OD, 21 OS. Last IOP 18 OD, OS. Pachy 606 Od 607 OS. C/d 0.55 OD, 0.6 OS. Pt thinks he may have had a visual field test done before.  4/30/19 OCT of RNFL OD borderline N, OS thin NS, N and G.  4/30/19 HVF WNL OU  Educated pt on findings. Recommend tx OS based on FHx, IOP today and early thinning though no VF changes. Pt would like time to think about if he would like to start drops. The patient doesn't want to be on drops forever. Pt advised of potential for SLT and educated on the visual consequences of noncompliance, lack of treatment and lack of f/u. Pt shows understanding. Pt states he will think about it and contact us if he chooses to go through w/treatment. Otherwise RTC 6 mo IOP check.

## 2019-05-03 DIAGNOSIS — I10 ESSENTIAL HYPERTENSION: ICD-10-CM

## 2019-05-03 DIAGNOSIS — N18.2 CHRONIC KIDNEY DISEASE, STAGE II (MILD): ICD-10-CM

## 2019-05-04 RX ORDER — EZETIMIBE 10 MG/1
TABLET ORAL
Qty: 90 TABLET | Refills: 1 | Status: SHIPPED | OUTPATIENT
Start: 2019-05-04 | End: 2019-12-23

## 2019-05-04 RX ORDER — BENAZEPRIL HYDROCHLORIDE 40 MG/1
TABLET ORAL
Qty: 90 TABLET | Refills: 1 | Status: SHIPPED | OUTPATIENT
Start: 2019-05-04 | End: 2019-11-06 | Stop reason: SDUPTHER

## 2019-05-04 RX ORDER — ALLOPURINOL 100 MG/1
TABLET ORAL
Qty: 90 TABLET | Refills: 1 | Status: SHIPPED | OUTPATIENT
Start: 2019-05-04 | End: 2019-12-23

## 2019-05-24 ENCOUNTER — HOSPITAL ENCOUNTER (EMERGENCY)
Facility: HOSPITAL | Age: 64
Discharge: HOME | End: 2019-05-24
Attending: SURGERY
Payer: COMMERCIAL

## 2019-05-24 VITALS
TEMPERATURE: 97 F | DIASTOLIC BLOOD PRESSURE: 92 MMHG | BODY MASS INDEX: 31.07 KG/M2 | HEIGHT: 70 IN | SYSTOLIC BLOOD PRESSURE: 153 MMHG | HEART RATE: 84 BPM | RESPIRATION RATE: 20 BRPM | WEIGHT: 217 LBS

## 2019-05-24 DIAGNOSIS — L25.9 CONTACT DERMATITIS, UNSPECIFIED CONTACT DERMATITIS TYPE, UNSPECIFIED TRIGGER: Primary | ICD-10-CM

## 2019-05-24 PROCEDURE — 96372 THER/PROPH/DIAG INJ SC/IM: CPT | Mod: HCNC

## 2019-05-24 PROCEDURE — 63600175 PHARM REV CODE 636 W HCPCS: Mod: HCNC | Performed by: NURSE PRACTITIONER

## 2019-05-24 PROCEDURE — 99284 EMERGENCY DEPT VISIT MOD MDM: CPT | Mod: 25,HCNC

## 2019-05-24 RX ORDER — METHYLPREDNISOLONE 4 MG/1
TABLET ORAL
Qty: 1 PACKAGE | Refills: 0 | Status: SHIPPED | OUTPATIENT
Start: 2019-05-24 | End: 2019-06-14

## 2019-05-24 RX ORDER — METHYLPREDNISOLONE SOD SUCC 125 MG
125 VIAL (EA) INJECTION
Status: COMPLETED | OUTPATIENT
Start: 2019-05-24 | End: 2019-05-24

## 2019-05-24 RX ORDER — CETIRIZINE HYDROCHLORIDE 10 MG/1
10 TABLET ORAL DAILY
Qty: 30 TABLET | Refills: 0 | Status: SHIPPED | OUTPATIENT
Start: 2019-05-24 | End: 2020-08-25

## 2019-05-24 RX ORDER — TRIAMCINOLONE ACETONIDE 1 MG/G
CREAM TOPICAL 2 TIMES DAILY
Qty: 1 TUBE | Refills: 0 | Status: SHIPPED | OUTPATIENT
Start: 2019-05-24 | End: 2019-05-31

## 2019-05-24 RX ADMIN — METHYLPREDNISOLONE SODIUM SUCCINATE 125 MG: 125 INJECTION, POWDER, FOR SOLUTION INTRAMUSCULAR; INTRAVENOUS at 02:05

## 2019-05-24 NOTE — ED NOTES
The patient was seen, evaluated and discharged. All questions were asked and/or answered and the pt was discharged with written and verbal instructions.  Discharged to home/self care.    - Condition at discharge: Good  - Mode of Discharge: Ambulatory  - The patient left the ED  - The discharge instructions were discussed with the patient.  - They state an understanding of the discharge instructions.  - Walked pt to the discharge station.

## 2019-05-24 NOTE — ED PROVIDER NOTES
Encounter Date: 5/24/2019       History     Chief Complaint   Patient presents with    Rash     Doin Diana     The history is provided by the patient.   Rash    This is a new problem. The current episode started yesterday. The problem has been waxing and waning. The problem is associated with plant contact. The rash is present on the left hand and right hand. The pain is at a severity of 0/10. Associated symptoms include itching. Pertinent negatives include no blisters, no pain and no weeping. He has tried a cold compress and anti-itch cream for the symptoms. The treatment provided no relief. Risk factors include new environmental exposures.     Review of patient's allergies indicates:   Allergen Reactions    1,3-butylene glycol Shortness Of Breath    Ciprofloxacin Nausea Only and Other (See Comments)    Levofloxacin Other (See Comments)    Sulfacetamide sodium Other (See Comments)     Past Medical History:   Diagnosis Date    Allergy     Back pain     BPH (benign prostatic hypertrophy)     Degenerative disc disease     GERD (gastroesophageal reflux disease)     Hyperlipidemia     Hypertension     Kidney stones     Meniere disease      Past Surgical History:   Procedure Laterality Date    BACK SURGERY      COLONOSCOPY N/A 4/7/2016    Performed by Esau Helms MD at [facility] ([address] [place] [address])    EYE SURGERY Bilateral     bilateral    SPINE SURGERY  2007    3 lumbar surgeries     Family History   Problem Relation Age of Onset    Hypertension Father     Parkinsonism Father     Heart disease Father     Heart disease Mother     Kidney disease Mother     Glaucoma Mother     Heart disease Sister     Anuerysm Brother     Cancer Maternal Uncle         pancreatic    No Known Problems Sister     No Known Problems Sister     No Known Problems Son     Melanoma Neg Hx      Social History     Tobacco Use    Smoking status: Never Smoker    Smokeless tobacco: Never Used   Substance Use Topics     Alcohol use: No     Alcohol/week: 0.0 oz    Drug use: No     Review of Systems   Constitutional: Negative.  Negative for appetite change, chills and fever.   HENT: Negative.  Negative for congestion, drooling, ear discharge, ear pain, postnasal drip, rhinorrhea and sore throat.    Eyes: Negative.    Respiratory: Negative.  Negative for apnea, chest tightness and shortness of breath.    Cardiovascular: Negative.  Negative for chest pain.   Gastrointestinal: Negative.  Negative for abdominal distention, blood in stool and nausea.   Endocrine: Negative.    Genitourinary: Negative.  Negative for dysuria, frequency and penile swelling.   Musculoskeletal: Negative.  Negative for back pain.   Skin: Positive for itching and rash.        Rash to bilateral hands.    Allergic/Immunologic: Negative.    Neurological: Negative.  Negative for dizziness, weakness, light-headedness and numbness.   Hematological: Negative.  Does not bruise/bleed easily.   Psychiatric/Behavioral: Negative.        Physical Exam     Initial Vitals [05/24/19 1440]   BP Pulse Resp Temp SpO2   (!) 153/92 84 20 96.7 °F (35.9 °C) --      MAP       --         Physical Exam    Nursing note and vitals reviewed.  Constitutional: He appears well-developed and well-nourished.   HENT:   Head: Normocephalic and atraumatic.   Right Ear: Tympanic membrane, external ear and ear canal normal.   Left Ear: Tympanic membrane, external ear and ear canal normal.   Nose: Nose normal.   Mouth/Throat: Uvula is midline, oropharynx is clear and moist and mucous membranes are normal.   Eyes: Conjunctivae and EOM are normal. Pupils are equal, round, and reactive to light.   Neck: Neck supple.   Cardiovascular: Normal rate, regular rhythm, normal heart sounds and intact distal pulses.   Pulmonary/Chest: Effort normal and breath sounds normal. He has no decreased breath sounds. He has no wheezes. He has no rhonchi. He has no rales.   Abdominal: Soft. Bowel sounds are normal.    Musculoskeletal: Normal range of motion.   Neurological: He is alert and oriented to person, place, and time. He has normal strength.   Skin: Skin is warm and dry. Rash noted. Rash is maculopapular.   Maculopapular rash to bilateral hands; no redness, warmth, oozing, or drainage noted.    Psychiatric: He has a normal mood and affect. His behavior is normal. Judgment and thought content normal.         ED Course   Procedures  Labs Reviewed - No data to display       Imaging Results    None              Medications   methylPREDNISolone sodium succinate injection 125 mg (has no administration in time range)                       Clinical Impression:       ICD-10-CM ICD-9-CM   1. Contact dermatitis, unspecified contact dermatitis type, unspecified trigger L25.9 692.9         Disposition:   Disposition: Discharged  Condition: Stable    New Prescriptions    CETIRIZINE (ZYRTEC) 10 MG TABLET    Take 1 tablet (10 mg total) by mouth once daily.    METHYLPREDNISOLONE (MEDROL DOSEPACK) 4 MG TABLET    Take as directed    TRIAMCINOLONE ACETONIDE 0.1% (KENALOG) 0.1 % CREAM    Apply topically 2 (two) times daily. for 7 days        The patient acknowledges that close follow up with medical provider is required. Instructed to follow up with PCP within 2 days. Patient was given specific return precautions. The patient agrees to comply with all instruction and directions given in the ER.                Darlin Liz NP  05/24/19 9448

## 2019-06-17 NOTE — PROGRESS NOTES
PAST MEDICAL HISTORY:  Hypertension.  Hyperlipidemia.  BPH.  Lumbar degenerative disc disease with previous back surgeries.  Meniere's disease.  Gastroesophageal reflux disease.  Nephrolithiasis.  Prostatitis.  Adenomatous colon polyp in 2003.  Chronic kidney disease, stage II-III.      MEDICATIONS:  Allopurinol 100 mg daily.  Benazepril 40 mg daily.  Zetia 10 mg daily.  Fenofibrate 160 mg daily.  Flonase as needed        REASON FOR VISIT:  This is a 64-year-old male.  He comes in, really not feeling   well for a while, but what really prompted the appointment was that yesterday in   the morning, sometime after awakening, he really was feeling very jittery then   had a nosebleed, took his blood pressure and it was 180/103.  He tried to calm   down and it was 150/95 and then all of a sudden he felt more relaxed and was   126/60, but for months now he has been feeling tired.  On occasion, he might get   a sharp pain in his chest that last for a second, but it could be on the right   or left side and he is not particularly doing anything.  His breathing is fine.    One thing that he has been noticing is just feeling easily bloated and   uncomfortable in the abdomen, particularly after eating and at times he has been   noticing discomfort in his right upper quadrant.  He also is having a tendency   toward constipation where he might go two or three days not having a bowel   movement, feeling uncomfortable and has to take an enema.  He had colonoscopy in   2016 that revealed a hyperplastic polyp.  No difficulty with urinating.  Might   have nocturia depends on what he eats.  He finds that he does not sleep well.    He really does not sleep well, but just aching in his body throughout the day or   at night.    PAST MEDICAL HISTORY:  Outlined above as well as medications.    PHYSICAL EXAMINATION:  VITAL SIGNS:  He has a weight of 222 pounds, a pulse rate of 64, blood pressure   reading 110/62.  LUNGS:  Clear.  HEART:   Regular rate and rhythm.  ABDOMEN:  Active bowel sounds, soft.  He displays pain in his right upper   quadrant and epigastric region.    IMPRESSION:  1. Hypertension.  2. Upper abdominal pain.  3. Fatigue.  4. Sleep disturbance.  5. Myalgias.    PLAN:  Today, we will get a chemistry, lipid, CBC.  We will give a trial of   gabapentin 300 mg at night.  He is to let me know how he responds.  If he might   wake up feeling better, the only other thing to think about will be also having   sleep apnea and may need to do a sleep study test and phone review to follow up.        TON  dd: 06/18/2019 09:22:44 (CDT)  td: 06/18/2019 16:18:04 (CDT)  Doc ID   #[id number]  Job ID #[id number]    CC:

## 2019-06-18 ENCOUNTER — OFFICE VISIT (OUTPATIENT)
Dept: INTERNAL MEDICINE | Facility: CLINIC | Age: 64
End: 2019-06-18
Payer: MEDICARE

## 2019-06-18 ENCOUNTER — LAB VISIT (OUTPATIENT)
Dept: LAB | Facility: HOSPITAL | Age: 64
End: 2019-06-18
Attending: INTERNAL MEDICINE
Payer: MEDICARE

## 2019-06-18 VITALS
HEIGHT: 70 IN | BODY MASS INDEX: 31.78 KG/M2 | OXYGEN SATURATION: 97 % | DIASTOLIC BLOOD PRESSURE: 86 MMHG | SYSTOLIC BLOOD PRESSURE: 142 MMHG | WEIGHT: 222 LBS | HEART RATE: 65 BPM

## 2019-06-18 DIAGNOSIS — E78.00 PURE HYPERCHOLESTEROLEMIA: ICD-10-CM

## 2019-06-18 DIAGNOSIS — K59.00 CONSTIPATION, UNSPECIFIED CONSTIPATION TYPE: ICD-10-CM

## 2019-06-18 DIAGNOSIS — R53.83 FATIGUE, UNSPECIFIED TYPE: ICD-10-CM

## 2019-06-18 DIAGNOSIS — I10 ESSENTIAL HYPERTENSION: Primary | ICD-10-CM

## 2019-06-18 DIAGNOSIS — G47.8 POOR SLEEP PATTERN: ICD-10-CM

## 2019-06-18 DIAGNOSIS — R10.10 UPPER ABDOMINAL PAIN: ICD-10-CM

## 2019-06-18 DIAGNOSIS — I10 ESSENTIAL HYPERTENSION: ICD-10-CM

## 2019-06-18 LAB
ALBUMIN SERPL BCP-MCNC: 3.9 G/DL (ref 3.5–5.2)
ALP SERPL-CCNC: 38 U/L (ref 55–135)
ALT SERPL W/O P-5'-P-CCNC: 29 U/L (ref 10–44)
ANION GAP SERPL CALC-SCNC: 10 MMOL/L (ref 8–16)
AST SERPL-CCNC: 22 U/L (ref 10–40)
BASOPHILS # BLD AUTO: 0.04 K/UL (ref 0–0.2)
BASOPHILS NFR BLD: 0.8 % (ref 0–1.9)
BILIRUB SERPL-MCNC: 0.4 MG/DL (ref 0.1–1)
BUN SERPL-MCNC: 22 MG/DL (ref 8–23)
CALCIUM SERPL-MCNC: 9.8 MG/DL (ref 8.7–10.5)
CHLORIDE SERPL-SCNC: 104 MMOL/L (ref 95–110)
CHOLEST SERPL-MCNC: 189 MG/DL (ref 120–199)
CHOLEST/HDLC SERPL: 4.7 {RATIO} (ref 2–5)
CO2 SERPL-SCNC: 25 MMOL/L (ref 23–29)
CREAT SERPL-MCNC: 1.6 MG/DL (ref 0.5–1.4)
DIFFERENTIAL METHOD: ABNORMAL
EOSINOPHIL # BLD AUTO: 0.2 K/UL (ref 0–0.5)
EOSINOPHIL NFR BLD: 3.2 % (ref 0–8)
ERYTHROCYTE [DISTWIDTH] IN BLOOD BY AUTOMATED COUNT: 12.2 % (ref 11.5–14.5)
EST. GFR  (AFRICAN AMERICAN): 51.1 ML/MIN/1.73 M^2
EST. GFR  (NON AFRICAN AMERICAN): 44.2 ML/MIN/1.73 M^2
GLUCOSE SERPL-MCNC: 95 MG/DL (ref 70–110)
HCT VFR BLD AUTO: 39.8 % (ref 40–54)
HDLC SERPL-MCNC: 40 MG/DL (ref 40–75)
HDLC SERPL: 21.2 % (ref 20–50)
HGB BLD-MCNC: 12.9 G/DL (ref 14–18)
IMM GRANULOCYTES # BLD AUTO: 0.02 K/UL (ref 0–0.04)
IMM GRANULOCYTES NFR BLD AUTO: 0.4 % (ref 0–0.5)
LDLC SERPL CALC-MCNC: 117.8 MG/DL (ref 63–159)
LYMPHOCYTES # BLD AUTO: 2.6 K/UL (ref 1–4.8)
LYMPHOCYTES NFR BLD: 49.3 % (ref 18–48)
MAGNESIUM SERPL-MCNC: 1.7 MG/DL (ref 1.6–2.6)
MCH RBC QN AUTO: 30.7 PG (ref 27–31)
MCHC RBC AUTO-ENTMCNC: 32.4 G/DL (ref 32–36)
MCV RBC AUTO: 95 FL (ref 82–98)
MONOCYTES # BLD AUTO: 0.6 K/UL (ref 0.3–1)
MONOCYTES NFR BLD: 11.1 % (ref 4–15)
NEUTROPHILS # BLD AUTO: 1.9 K/UL (ref 1.8–7.7)
NEUTROPHILS NFR BLD: 35.2 % (ref 38–73)
NONHDLC SERPL-MCNC: 149 MG/DL
NRBC BLD-RTO: 0 /100 WBC
PLATELET # BLD AUTO: 233 K/UL (ref 150–350)
PMV BLD AUTO: 9.9 FL (ref 9.2–12.9)
POTASSIUM SERPL-SCNC: 4.6 MMOL/L (ref 3.5–5.1)
PROT SERPL-MCNC: 7 G/DL (ref 6–8.4)
RBC # BLD AUTO: 4.2 M/UL (ref 4.6–6.2)
SODIUM SERPL-SCNC: 139 MMOL/L (ref 136–145)
TRIGL SERPL-MCNC: 156 MG/DL (ref 30–150)
WBC # BLD AUTO: 5.31 K/UL (ref 3.9–12.7)

## 2019-06-18 PROCEDURE — 3077F PR MOST RECENT SYSTOLIC BLOOD PRESSURE >= 140 MM HG: ICD-10-PCS | Mod: HCNC,CPTII,S$GLB, | Performed by: INTERNAL MEDICINE

## 2019-06-18 PROCEDURE — 1101F PT FALLS ASSESS-DOCD LE1/YR: CPT | Mod: HCNC,CPTII,S$GLB, | Performed by: INTERNAL MEDICINE

## 2019-06-18 PROCEDURE — 80053 COMPREHEN METABOLIC PANEL: CPT | Mod: HCNC

## 2019-06-18 PROCEDURE — 99214 OFFICE O/P EST MOD 30 MIN: CPT | Mod: HCNC,S$GLB,, | Performed by: INTERNAL MEDICINE

## 2019-06-18 PROCEDURE — 80061 LIPID PANEL: CPT | Mod: HCNC

## 2019-06-18 PROCEDURE — 99999 PR PBB SHADOW E&M-EST. PATIENT-LVL III: ICD-10-PCS | Mod: PBBFAC,HCNC,, | Performed by: INTERNAL MEDICINE

## 2019-06-18 PROCEDURE — 1101F PR PT FALLS ASSESS DOC 0-1 FALLS W/OUT INJ PAST YR: ICD-10-PCS | Mod: HCNC,CPTII,S$GLB, | Performed by: INTERNAL MEDICINE

## 2019-06-18 PROCEDURE — 83735 ASSAY OF MAGNESIUM: CPT | Mod: HCNC

## 2019-06-18 PROCEDURE — 3079F PR MOST RECENT DIASTOLIC BLOOD PRESSURE 80-89 MM HG: ICD-10-PCS | Mod: HCNC,CPTII,S$GLB, | Performed by: INTERNAL MEDICINE

## 2019-06-18 PROCEDURE — 3077F SYST BP >= 140 MM HG: CPT | Mod: HCNC,CPTII,S$GLB, | Performed by: INTERNAL MEDICINE

## 2019-06-18 PROCEDURE — 99214 PR OFFICE/OUTPT VISIT, EST, LEVL IV, 30-39 MIN: ICD-10-PCS | Mod: HCNC,S$GLB,, | Performed by: INTERNAL MEDICINE

## 2019-06-18 PROCEDURE — 99499 RISK ADDL DX/OHS AUDIT: ICD-10-PCS | Mod: S$GLB,,, | Performed by: INTERNAL MEDICINE

## 2019-06-18 PROCEDURE — 99499 UNLISTED E&M SERVICE: CPT | Mod: S$GLB,,, | Performed by: INTERNAL MEDICINE

## 2019-06-18 PROCEDURE — 85025 COMPLETE CBC W/AUTO DIFF WBC: CPT | Mod: HCNC

## 2019-06-18 PROCEDURE — 3079F DIAST BP 80-89 MM HG: CPT | Mod: HCNC,CPTII,S$GLB, | Performed by: INTERNAL MEDICINE

## 2019-06-18 PROCEDURE — 99999 PR PBB SHADOW E&M-EST. PATIENT-LVL III: CPT | Mod: PBBFAC,HCNC,, | Performed by: INTERNAL MEDICINE

## 2019-06-18 PROCEDURE — 36415 COLL VENOUS BLD VENIPUNCTURE: CPT | Mod: HCNC,PO

## 2019-06-18 RX ORDER — MOMETASONE FUROATE 50 UG/1
2 SPRAY, METERED NASAL DAILY
Qty: 17 G | Refills: 0 | Status: SHIPPED | OUTPATIENT
Start: 2019-06-18 | End: 2021-12-23

## 2019-06-18 RX ORDER — GABAPENTIN 300 MG/1
300 CAPSULE ORAL NIGHTLY
Qty: 10 CAPSULE | Refills: 0 | Status: SHIPPED | OUTPATIENT
Start: 2019-06-18 | End: 2019-06-21 | Stop reason: SDUPTHER

## 2019-06-21 ENCOUNTER — TELEPHONE (OUTPATIENT)
Dept: INTERNAL MEDICINE | Facility: CLINIC | Age: 64
End: 2019-06-21

## 2019-06-21 RX ORDER — AZELASTINE 1 MG/ML
2 SPRAY, METERED NASAL 2 TIMES DAILY
Qty: 30 ML | Refills: 0 | Status: SHIPPED | OUTPATIENT
Start: 2019-06-21 | End: 2019-07-21 | Stop reason: SDUPTHER

## 2019-06-21 RX ORDER — GABAPENTIN 300 MG/1
300 CAPSULE ORAL NIGHTLY
Qty: 30 CAPSULE | Refills: 3 | Status: SHIPPED | OUTPATIENT
Start: 2019-06-21 | End: 2020-08-25

## 2019-06-21 NOTE — TELEPHONE ENCOUNTER
----- Message from Ethan Sheikh sent at 6/21/2019  8:26 AM CDT -----  Contact: [phone]  Type: Test Results    What test was performed? Labs     Who ordered the test?    When and where were the test performed? 06/17    Comments: please call and advise, Thanks

## 2019-06-24 ENCOUNTER — HOSPITAL ENCOUNTER (OUTPATIENT)
Dept: RADIOLOGY | Facility: HOSPITAL | Age: 64
Discharge: HOME | End: 2019-06-24
Attending: INTERNAL MEDICINE
Payer: COMMERCIAL

## 2019-06-24 DIAGNOSIS — R10.10 UPPER ABDOMINAL PAIN: ICD-10-CM

## 2019-06-24 PROCEDURE — 76700 US EXAM ABDOM COMPLETE: CPT | Mod: TC,HCNC

## 2019-06-24 PROCEDURE — 76700 US ABDOMEN COMPLETE: ICD-10-PCS | Mod: 26,HCNC,, | Performed by: RADIOLOGY

## 2019-06-24 PROCEDURE — 76700 US EXAM ABDOM COMPLETE: CPT | Mod: 26,HCNC,, | Performed by: RADIOLOGY

## 2019-06-25 ENCOUNTER — DOCUMENTATION ONLY (OUTPATIENT)
Dept: INTERNAL MEDICINE | Facility: CLINIC | Age: 64
End: 2019-06-25

## 2019-07-11 RX ORDER — FENOFIBRATE 160 MG/1
TABLET ORAL
Qty: 30 TABLET | Refills: 9 | Status: SHIPPED | OUTPATIENT
Start: 2019-07-11 | End: 2020-12-07

## 2019-07-18 ENCOUNTER — TELEPHONE (OUTPATIENT)
Dept: INTERNAL MEDICINE | Facility: CLINIC | Age: 64
End: 2019-07-18

## 2019-07-19 NOTE — TELEPHONE ENCOUNTER
----- Message from eNss Sosa sent at 7/18/2019  4:50 PM CDT -----  Contact: Patient [phone]  Pt states that he is calling to speak with someone in your office. He states that he is having pain in the left and right side of his back and wants to know if  can see him for this or does he have to be referred to see a specialist. Please call back and advise.      Thanks

## 2019-07-22 RX ORDER — AZELASTINE 1 MG/ML
2 SPRAY, METERED NASAL 2 TIMES DAILY
Qty: 30 ML | Refills: 0 | Status: SHIPPED | OUTPATIENT
Start: 2019-07-22 | End: 2020-08-25

## 2019-08-01 NOTE — PROGRESS NOTES
PAST MEDICAL HISTORY:  Hypertension.  Hyperlipidemia.  BPH.  Lumbar degenerative disc disease with previous back surgeries.  Meniere's disease.  Gastroesophageal reflux disease.  Nephrolithiasis.  Prostatitis.  Adenomatous colon polyp in .  Chronic kidney disease, stage II-III.     SOCIAL HISTORY:  Tobacco and alcohol use -- none.  , has one living son.  One son is .  Works intermittently as a .     MEDICATIONS:  Allopurinol 100 mg daily.  Benazepril 40 mg daily.  Zetia 10 mg daily.  Fenofibrate 160 mg daily.  Flonase as needed.  Gabapentin 300 mg Q HS      REASON FOR VISIT:  This is a 64-year-old male.  For two weeks, he started   noticing a pain on his right side in his mid-thoracic region.  This is different   than his normal lower back discomfort from his lumbar spondylosis.  He does not   recall any incident or activity that he was doing or could have caused this.    At first, he thought it was something sticking him and he thought there might   have been an ingrown hair or some skin lesion, but his girlfriend did not see   that.  At times she would rub it, but there was one time she hit the spot that   made it hurt.  He also five days ago went to a chiropractor for his normal back   adjustment and upon doing so, he did feel discomfort in this area.  He thinks it   may be a little better, but it has not completely resolved.  He might notice it   more in the lying position.  He is not short of breath.  He has an occasional   cough, but this is nothing new and it has been worsened.  No chest pain.  No   abdominal pain, fever or chills.    PAST MEDICAL HISTORY AND MEDICATIONS:  Outlined above.    PHYSICAL EXAMINATION:  VITAL SIGNS:  His weight is 220 pounds, blood pressure 128/68.  LUNGS:  Clear.  No abnormal breath sounds noted in the right thoracic or chest   field.  HEART:  Regular rate and rhythm.  ABDOMEN:  Active bowel sounds, soft, nontender.  No hepatosplenomegaly or   abdominal  masses.  MUSCULOSKELETAL:  There is an area of discomfort that is noted when I palpate in   the right mid thoracic area.  It is below the scapula and it is more medial.    IMPRESSION:  Persistent thoracic and muscular strain.    PLAN:  We will give a trial of methocarbamol 500 mg three times a day for one   week, alternate warm and cool pack over the area, massage also of the area is   recommended.  If still persists, may need to do a CT of the thoracic cavity.        JAM/HN  dd: 08/02/2019 13:00:15 (CDT)  td: 08/03/2019 11:41:29 (CDT)  Doc ID   #[id number]  Job ID #[id number]    CC:

## 2019-08-02 ENCOUNTER — OFFICE VISIT (OUTPATIENT)
Dept: INTERNAL MEDICINE | Facility: CLINIC | Age: 64
End: 2019-08-02
Payer: MEDICARE

## 2019-08-02 VITALS
BODY MASS INDEX: 31.5 KG/M2 | HEART RATE: 76 BPM | SYSTOLIC BLOOD PRESSURE: 130 MMHG | DIASTOLIC BLOOD PRESSURE: 82 MMHG | WEIGHT: 220 LBS | OXYGEN SATURATION: 99 % | HEIGHT: 70 IN

## 2019-08-02 DIAGNOSIS — G89.29 CHRONIC RIGHT-SIDED THORACIC BACK PAIN: Primary | ICD-10-CM

## 2019-08-02 DIAGNOSIS — M54.6 CHRONIC RIGHT-SIDED THORACIC BACK PAIN: Primary | ICD-10-CM

## 2019-08-02 PROCEDURE — 99214 OFFICE O/P EST MOD 30 MIN: CPT | Mod: 25,HCNC,S$GLB, | Performed by: INTERNAL MEDICINE

## 2019-08-02 PROCEDURE — 99214 PR OFFICE/OUTPT VISIT, EST, LEVL IV, 30-39 MIN: ICD-10-PCS | Mod: 25,HCNC,S$GLB, | Performed by: INTERNAL MEDICINE

## 2019-08-02 PROCEDURE — 90732 PPSV23 VACC 2 YRS+ SUBQ/IM: CPT | Mod: HCNC,S$GLB,, | Performed by: INTERNAL MEDICINE

## 2019-08-02 PROCEDURE — 3075F PR MOST RECENT SYSTOLIC BLOOD PRESS GE 130-139MM HG: ICD-10-PCS | Mod: HCNC,CPTII,S$GLB, | Performed by: INTERNAL MEDICINE

## 2019-08-02 PROCEDURE — 99999 PR PBB SHADOW E&M-EST. PATIENT-LVL III: CPT | Mod: PBBFAC,HCNC,, | Performed by: INTERNAL MEDICINE

## 2019-08-02 PROCEDURE — 99999 PR PBB SHADOW E&M-EST. PATIENT-LVL III: ICD-10-PCS | Mod: PBBFAC,HCNC,, | Performed by: INTERNAL MEDICINE

## 2019-08-02 PROCEDURE — 3075F SYST BP GE 130 - 139MM HG: CPT | Mod: HCNC,CPTII,S$GLB, | Performed by: INTERNAL MEDICINE

## 2019-08-02 PROCEDURE — 90732 PNEUMOCOCCAL POLYSACCHARIDE VACCINE 23-VALENT =>2YO SQ IM: ICD-10-PCS | Mod: HCNC,S$GLB,, | Performed by: INTERNAL MEDICINE

## 2019-08-02 PROCEDURE — 1101F PT FALLS ASSESS-DOCD LE1/YR: CPT | Mod: HCNC,CPTII,S$GLB, | Performed by: INTERNAL MEDICINE

## 2019-08-02 PROCEDURE — G0009 ADMIN PNEUMOCOCCAL VACCINE: HCPCS | Mod: 59,HCNC,S$GLB, | Performed by: INTERNAL MEDICINE

## 2019-08-02 PROCEDURE — 1101F PR PT FALLS ASSESS DOC 0-1 FALLS W/OUT INJ PAST YR: ICD-10-PCS | Mod: HCNC,CPTII,S$GLB, | Performed by: INTERNAL MEDICINE

## 2019-08-02 PROCEDURE — 3079F PR MOST RECENT DIASTOLIC BLOOD PRESSURE 80-89 MM HG: ICD-10-PCS | Mod: HCNC,CPTII,S$GLB, | Performed by: INTERNAL MEDICINE

## 2019-08-02 PROCEDURE — G0009 PNEUMOCOCCAL POLYSACCHARIDE VACCINE 23-VALENT =>2YO SQ IM: ICD-10-PCS | Mod: 59,HCNC,S$GLB, | Performed by: INTERNAL MEDICINE

## 2019-08-02 PROCEDURE — 3079F DIAST BP 80-89 MM HG: CPT | Mod: HCNC,CPTII,S$GLB, | Performed by: INTERNAL MEDICINE

## 2019-08-02 RX ORDER — METHOCARBAMOL 500 MG/1
500 TABLET, FILM COATED ORAL 3 TIMES DAILY
Qty: 30 TABLET | Refills: 0 | Status: SHIPPED | OUTPATIENT
Start: 2019-08-02 | End: 2019-08-12

## 2019-08-21 ENCOUNTER — HOSPITAL ENCOUNTER (EMERGENCY)
Facility: HOSPITAL | Age: 64
Discharge: HOME | End: 2019-08-21
Attending: SURGERY
Payer: COMMERCIAL

## 2019-08-21 VITALS
SYSTOLIC BLOOD PRESSURE: 113 MMHG | OXYGEN SATURATION: 99 % | RESPIRATION RATE: 17 BRPM | BODY MASS INDEX: 31.35 KG/M2 | WEIGHT: 219 LBS | HEIGHT: 70 IN | DIASTOLIC BLOOD PRESSURE: 65 MMHG | HEART RATE: 72 BPM

## 2019-08-21 DIAGNOSIS — E86.9 VOLUME DEPLETION: Primary | ICD-10-CM

## 2019-08-21 DIAGNOSIS — R06.00 DYSPNEA: ICD-10-CM

## 2019-08-21 LAB
ALBUMIN SERPL BCP-MCNC: 4.2 G/DL (ref 3.5–5.2)
ALP SERPL-CCNC: 46 U/L (ref 55–135)
ALT SERPL W/O P-5'-P-CCNC: 29 U/L (ref 10–44)
AMPHET+METHAMPHET UR QL: NEGATIVE
ANION GAP SERPL CALC-SCNC: 12 MMOL/L (ref 8–16)
AST SERPL-CCNC: 22 U/L (ref 10–40)
BARBITURATES UR QL SCN>200 NG/ML: NEGATIVE
BASOPHILS # BLD AUTO: 0.02 K/UL (ref 0–0.2)
BASOPHILS NFR BLD: 0.3 % (ref 0–1.9)
BENZODIAZ UR QL SCN>200 NG/ML: NEGATIVE
BILIRUB SERPL-MCNC: 0.4 MG/DL (ref 0.1–1)
BILIRUB UR QL STRIP: NEGATIVE
BUN SERPL-MCNC: 35 MG/DL (ref 8–23)
BZE UR QL SCN: NEGATIVE
CALCIUM SERPL-MCNC: 9.8 MG/DL (ref 8.7–10.5)
CANNABINOIDS UR QL SCN: NEGATIVE
CHLORIDE SERPL-SCNC: 105 MMOL/L (ref 95–110)
CK MB SERPL-MCNC: 2 NG/ML (ref 0.1–6.5)
CK MB SERPL-RTO: 1.6 % (ref 0–5)
CK SERPL-CCNC: 124 U/L (ref 20–200)
CLARITY UR: CLEAR
CO2 SERPL-SCNC: 20 MMOL/L (ref 23–29)
COLOR UR: YELLOW
CREAT SERPL-MCNC: 2 MG/DL (ref 0.5–1.4)
CREAT UR-MCNC: 46.9 MG/DL (ref 23–375)
DIFFERENTIAL METHOD: ABNORMAL
EOSINOPHIL # BLD AUTO: 0.2 K/UL (ref 0–0.5)
EOSINOPHIL NFR BLD: 2.9 % (ref 0–8)
ERYTHROCYTE [DISTWIDTH] IN BLOOD BY AUTOMATED COUNT: 12.6 % (ref 11.5–14.5)
EST. GFR  (AFRICAN AMERICAN): 39 ML/MIN/1.73 M^2
EST. GFR  (NON AFRICAN AMERICAN): 34 ML/MIN/1.73 M^2
GLUCOSE SERPL-MCNC: 121 MG/DL (ref 70–110)
GLUCOSE UR QL STRIP: NEGATIVE
HCT VFR BLD AUTO: 38 % (ref 40–54)
HGB BLD-MCNC: 12.4 G/DL (ref 14–18)
HGB UR QL STRIP: NEGATIVE
IMM GRANULOCYTES # BLD AUTO: 0.02 K/UL (ref 0–0.04)
IMM GRANULOCYTES NFR BLD AUTO: 0.3 % (ref 0–0.5)
KETONES UR QL STRIP: NEGATIVE
LEUKOCYTE ESTERASE UR QL STRIP: NEGATIVE
LIPASE SERPL-CCNC: 42 U/L (ref 4–60)
LYMPHOCYTES # BLD AUTO: 3.2 K/UL (ref 1–4.8)
LYMPHOCYTES NFR BLD: 52.9 % (ref 18–48)
MAGNESIUM SERPL-MCNC: 1.8 MG/DL (ref 1.6–2.6)
MCH RBC QN AUTO: 30.1 PG (ref 27–31)
MCHC RBC AUTO-ENTMCNC: 32.6 G/DL (ref 32–36)
MCV RBC AUTO: 92 FL (ref 82–98)
METHADONE UR QL SCN>300 NG/ML: NEGATIVE
MONOCYTES # BLD AUTO: 0.6 K/UL (ref 0.3–1)
MONOCYTES NFR BLD: 9.6 % (ref 4–15)
NEUTROPHILS # BLD AUTO: 2 K/UL (ref 1.8–7.7)
NEUTROPHILS NFR BLD: 34 % (ref 38–73)
NITRITE UR QL STRIP: NEGATIVE
NRBC BLD-RTO: 0 /100 WBC
OPIATES UR QL SCN: NEGATIVE
PCP UR QL SCN>25 NG/ML: NEGATIVE
PH UR STRIP: 5 [PH] (ref 5–8)
PLATELET # BLD AUTO: 260 K/UL (ref 150–350)
PMV BLD AUTO: 9.6 FL (ref 9.2–12.9)
POTASSIUM SERPL-SCNC: 4.1 MMOL/L (ref 3.5–5.1)
PROT SERPL-MCNC: 7.5 G/DL (ref 6–8.4)
PROT UR QL STRIP: NEGATIVE
RBC # BLD AUTO: 4.12 M/UL (ref 4.6–6.2)
SODIUM SERPL-SCNC: 137 MMOL/L (ref 136–145)
SP GR UR STRIP: 1.01 (ref 1–1.03)
TOXICOLOGY INFORMATION: NORMAL
TROPONIN I SERPL DL<=0.01 NG/ML-MCNC: <0.006 NG/ML (ref 0–0.03)
URN SPEC COLLECT METH UR: NORMAL
UROBILINOGEN UR STRIP-ACNC: NEGATIVE EU/DL
WBC # BLD AUTO: 5.95 K/UL (ref 3.9–12.7)

## 2019-08-21 PROCEDURE — 93010 EKG 12-LEAD: ICD-10-PCS | Mod: ,,, | Performed by: INTERNAL MEDICINE

## 2019-08-21 PROCEDURE — 96360 HYDRATION IV INFUSION INIT: CPT

## 2019-08-21 PROCEDURE — 82550 ASSAY OF CK (CPK): CPT

## 2019-08-21 PROCEDURE — 93005 ELECTROCARDIOGRAM TRACING: CPT

## 2019-08-21 PROCEDURE — 83735 ASSAY OF MAGNESIUM: CPT

## 2019-08-21 PROCEDURE — 81003 URINALYSIS AUTO W/O SCOPE: CPT | Mod: 59

## 2019-08-21 PROCEDURE — 83690 ASSAY OF LIPASE: CPT

## 2019-08-21 PROCEDURE — 80053 COMPREHEN METABOLIC PANEL: CPT

## 2019-08-21 PROCEDURE — 84484 ASSAY OF TROPONIN QUANT: CPT

## 2019-08-21 PROCEDURE — 82553 CREATINE MB FRACTION: CPT

## 2019-08-21 PROCEDURE — 93010 ELECTROCARDIOGRAM REPORT: CPT | Mod: ,,, | Performed by: INTERNAL MEDICINE

## 2019-08-21 PROCEDURE — 80307 DRUG TEST PRSMV CHEM ANLYZR: CPT

## 2019-08-21 PROCEDURE — 85025 COMPLETE CBC W/AUTO DIFF WBC: CPT

## 2019-08-21 PROCEDURE — 63600175 PHARM REV CODE 636 W HCPCS: Performed by: SURGERY

## 2019-08-21 PROCEDURE — 99284 EMERGENCY DEPT VISIT MOD MDM: CPT | Mod: 25

## 2019-08-21 RX ORDER — ONDANSETRON 2 MG/ML
4 INJECTION INTRAMUSCULAR; INTRAVENOUS
Status: DISCONTINUED | OUTPATIENT
Start: 2019-08-21 | End: 2019-08-21 | Stop reason: HOSPADM

## 2019-08-21 RX ORDER — SODIUM CHLORIDE 9 MG/ML
1000 INJECTION, SOLUTION INTRAVENOUS
Status: COMPLETED | OUTPATIENT
Start: 2019-08-21 | End: 2019-08-21

## 2019-08-21 RX ADMIN — SODIUM CHLORIDE 1000 ML: 0.9 INJECTION, SOLUTION INTRAVENOUS at 07:08

## 2019-08-21 NOTE — ED TRIAGE NOTES
Arrives from home by DIXON with complaints of weakness. Reports that he is a  who works in the heat. Woke up this morning feeling dizzy like he would pass out after having breakfast and morning medication

## 2019-08-24 ENCOUNTER — OFFICE VISIT (OUTPATIENT)
Dept: INTERNAL MEDICINE | Facility: CLINIC | Age: 64
End: 2019-08-24
Payer: COMMERCIAL

## 2019-08-24 VITALS
HEIGHT: 70 IN | BODY MASS INDEX: 31.59 KG/M2 | WEIGHT: 220.69 LBS | OXYGEN SATURATION: 98 % | HEART RATE: 81 BPM | SYSTOLIC BLOOD PRESSURE: 114 MMHG | DIASTOLIC BLOOD PRESSURE: 64 MMHG

## 2019-08-24 DIAGNOSIS — M47.27 OSTEOARTHRITIS OF SPINE WITH RADICULOPATHY, LUMBOSACRAL REGION: ICD-10-CM

## 2019-08-24 DIAGNOSIS — N40.0 BENIGN PROSTATIC HYPERPLASIA WITHOUT LOWER URINARY TRACT SYMPTOMS: ICD-10-CM

## 2019-08-24 DIAGNOSIS — Z82.49 FAMILY HISTORY OF EARLY CAD: ICD-10-CM

## 2019-08-24 DIAGNOSIS — K21.9 GASTROESOPHAGEAL REFLUX DISEASE, ESOPHAGITIS PRESENCE NOT SPECIFIED: ICD-10-CM

## 2019-08-24 DIAGNOSIS — H81.03 MENIERE'S DISEASE OF BOTH EARS: ICD-10-CM

## 2019-08-24 DIAGNOSIS — E78.00 PURE HYPERCHOLESTEROLEMIA: ICD-10-CM

## 2019-08-24 DIAGNOSIS — Z78.9 STATIN INTOLERANCE: ICD-10-CM

## 2019-08-24 DIAGNOSIS — I70.0 AORTIC ATHEROSCLEROSIS: ICD-10-CM

## 2019-08-24 DIAGNOSIS — M54.50 CHRONIC LOW BACK PAIN WITHOUT SCIATICA, UNSPECIFIED BACK PAIN LATERALITY: ICD-10-CM

## 2019-08-24 DIAGNOSIS — H10.9 CONJUNCTIVITIS, UNSPECIFIED CONJUNCTIVITIS TYPE, UNSPECIFIED LATERALITY: Primary | ICD-10-CM

## 2019-08-24 DIAGNOSIS — G89.29 CHRONIC LOW BACK PAIN WITHOUT SCIATICA, UNSPECIFIED BACK PAIN LATERALITY: ICD-10-CM

## 2019-08-24 DIAGNOSIS — I10 ESSENTIAL HYPERTENSION: ICD-10-CM

## 2019-08-24 DIAGNOSIS — N18.30 STAGE 3 CHRONIC KIDNEY DISEASE: ICD-10-CM

## 2019-08-24 PROCEDURE — 99213 OFFICE O/P EST LOW 20 MIN: CPT | Mod: S$GLB,,, | Performed by: FAMILY MEDICINE

## 2019-08-24 PROCEDURE — 99999 PR PBB SHADOW E&M-EST. PATIENT-LVL III: CPT | Mod: PBBFAC,,, | Performed by: FAMILY MEDICINE

## 2019-08-24 PROCEDURE — 3078F DIAST BP <80 MM HG: CPT | Mod: CPTII,S$GLB,, | Performed by: FAMILY MEDICINE

## 2019-08-24 PROCEDURE — 99213 PR OFFICE/OUTPT VISIT, EST, LEVL III, 20-29 MIN: ICD-10-PCS | Mod: S$GLB,,, | Performed by: FAMILY MEDICINE

## 2019-08-24 PROCEDURE — 99499 UNLISTED E&M SERVICE: CPT | Mod: S$GLB,,, | Performed by: FAMILY MEDICINE

## 2019-08-24 PROCEDURE — 3074F SYST BP LT 130 MM HG: CPT | Mod: CPTII,S$GLB,, | Performed by: FAMILY MEDICINE

## 2019-08-24 PROCEDURE — 99999 PR PBB SHADOW E&M-EST. PATIENT-LVL III: ICD-10-PCS | Mod: PBBFAC,,, | Performed by: FAMILY MEDICINE

## 2019-08-24 PROCEDURE — 1101F PR PT FALLS ASSESS DOC 0-1 FALLS W/OUT INJ PAST YR: ICD-10-PCS | Mod: CPTII,S$GLB,, | Performed by: FAMILY MEDICINE

## 2019-08-24 PROCEDURE — 1101F PT FALLS ASSESS-DOCD LE1/YR: CPT | Mod: CPTII,S$GLB,, | Performed by: FAMILY MEDICINE

## 2019-08-24 PROCEDURE — 99499 RISK ADDL DX/OHS AUDIT: ICD-10-PCS | Mod: S$GLB,,, | Performed by: FAMILY MEDICINE

## 2019-08-24 PROCEDURE — 3078F PR MOST RECENT DIASTOLIC BLOOD PRESSURE < 80 MM HG: ICD-10-PCS | Mod: CPTII,S$GLB,, | Performed by: FAMILY MEDICINE

## 2019-08-24 PROCEDURE — 3074F PR MOST RECENT SYSTOLIC BLOOD PRESSURE < 130 MM HG: ICD-10-PCS | Mod: CPTII,S$GLB,, | Performed by: FAMILY MEDICINE

## 2019-08-24 RX ORDER — ERYTHROMYCIN 5 MG/G
OINTMENT OPHTHALMIC EVERY 8 HOURS
Qty: 1 G | Refills: 1 | Status: SHIPPED | OUTPATIENT
Start: 2019-08-24 | End: 2021-12-23

## 2019-08-27 NOTE — PROGRESS NOTES
Subjective:   Patient ID: Demarco Means is a 64 y.o. male.    Chief Complaint: No chief complaint on file.      Conjunctivitis   This is a new problem. The current episode started yesterday. The problem occurs constantly. The problem has been rapidly worsening. Pertinent negatives include no abdominal pain, anorexia, arthralgias, change in bowel habit, chest pain, chills, congestion, coughing, diaphoresis, fatigue, fever, headaches, nausea, neck pain, rash, sore throat or vomiting. Nothing aggravates the symptoms. He has tried nothing for the symptoms. The treatment provided no relief.       Patient queried and denies any further complaints.    LOCATION  DURATION  SEVERITY  QUALITY  TIMING  CAUSE  ASSOCIATED SYMPTOMS  MODIFIERS.    ALLERGIES AND MEDICATIONS: updated and reviewed.  Review of patient's allergies indicates:   Allergen Reactions    1,3-butylene glycol Shortness Of Breath    Ciprofloxacin Nausea Only and Other (See Comments)    Levofloxacin Other (See Comments)    Sulfacetamide sodium Other (See Comments)       Current Outpatient Medications:     allopurinol (ZYLOPRIM) 100 MG tablet, TAKE 1 TABLET BY MOUTH ONCE DAILY, Disp: 90 tablet, Rfl: 1    azelastine (ASTELIN) 137 mcg (0.1 %) nasal spray, 2 SPRAYS (274 MCG TOTAL) BY NASAL ROUTE 2 (TWO) TIMES DAILY., Disp: 30 mL, Rfl: 0    benazepril (LOTENSIN) 40 MG tablet, TAKE 1 TABLET BY MOUTH ONCE DAILY, Disp: 90 tablet, Rfl: 1    cetirizine (ZYRTEC) 10 MG tablet, Take 1 tablet (10 mg total) by mouth once daily. (Patient taking differently: Take 10 mg by mouth daily as needed. ), Disp: 30 tablet, Rfl: 0    ezetimibe (ZETIA) 10 mg tablet, TAKE 1 TABLET BY MOUTH ONCE DAILY, Disp: 90 tablet, Rfl: 1    fenofibrate 160 MG Tab, TAKE 1 TABLET BY MOUTH ONCE DAILY, Disp: 30 tablet, Rfl: 9    gabapentin (NEURONTIN) 300 MG capsule, Take 1 capsule (300 mg total) by mouth every evening., Disp: 30 capsule, Rfl: 3    mometasone (NASONEX) 50 mcg/actuation  "nasal spray, 2 sprays by Nasal route once daily., Disp: 17 g, Rfl: 0    multivitamin capsule, Take 1 capsule by mouth once daily., Disp: , Rfl:     sildenafil (VIAGRA) 100 MG tablet, Take 1 tablet (100 mg total) by mouth daily as needed for Erectile Dysfunction., Disp: 10 tablet, Rfl: 2    ubidecarenone (COENZYME Q10) 100 mg Tab, Take 1 tablet by mouth once daily., Disp: , Rfl:     erythromycin (ROMYCIN) ophthalmic ointment, Place into both eyes every 8 (eight) hours. X 7 days, Disp: 1 g, Rfl: 1    triamcinolone acetonide 0.1% (KENALOG) 0.1 % cream, Apply topically 2 (two) times daily. for 7 days, Disp: 1 Tube, Rfl: 0    Review of Systems   Constitutional: Negative for activity change, appetite change, chills, diaphoresis, fatigue, fever and unexpected weight change.   HENT: Negative for congestion, ear discharge, ear pain, postnasal drip, rhinorrhea, sneezing and sore throat.    Eyes: Positive for photophobia, pain, discharge, redness and itching.   Respiratory: Negative for cough, chest tightness, shortness of breath and wheezing.    Cardiovascular: Negative for chest pain and palpitations.   Gastrointestinal: Negative for abdominal distention, abdominal pain, anorexia, change in bowel habit, diarrhea, nausea and vomiting.   Genitourinary: Negative for dysuria.   Musculoskeletal: Negative for arthralgias and neck pain.   Skin: Negative for rash.   Neurological: Negative for headaches.       Objective:     Vitals:    08/24/19 1318   BP: 114/64   Pulse: 81   SpO2: 98%   Weight: 100.1 kg (220 lb 10.9 oz)   Height: 5' 10" (1.778 m)   PainSc:   6     Body mass index is 31.66 kg/m².    Physical Exam   Constitutional: He is oriented to person, place, and time. He appears well-developed and well-nourished.   HENT:   Head: Normocephalic and atraumatic.   Eyes: Right eye exhibits exudate. Left eye exhibits exudate. Right conjunctiva is injected. Left conjunctiva is injected.   Cardiovascular: Normal rate and regular " rhythm.   Pulmonary/Chest: Effort normal and breath sounds normal.   Neurological: He is alert and oriented to person, place, and time.   Skin: Skin is warm and dry.   Nursing note and vitals reviewed.      Assessment and Plan:   Diagnoses and all orders for this visit:    Conjunctivitis, unspecified conjunctivitis type, unspecified laterality    Osteoarthritis of spine with radiculopathy, lumbosacral region    Meniere's disease of both ears    Pure hypercholesterolemia    Essential hypertension    Aortic atherosclerosis    Benign prostatic hyperplasia without lower urinary tract symptoms    Stage 3 chronic kidney disease    Gastroesophageal reflux disease, esophagitis presence not specified    Chronic low back pain without sciatica, unspecified back pain laterality    Statin intolerance    Family history of early CAD    Other orders  -     erythromycin (ROMYCIN) ophthalmic ointment; Place into both eyes every 8 (eight) hours. X 7 days        Follow up in about 1 week (around 8/31/2019).    THIS NOTE WILL BE SHARED WITH THE PATIENT.

## 2019-08-28 ENCOUNTER — OFFICE VISIT (OUTPATIENT)
Dept: INTERNAL MEDICINE | Facility: CLINIC | Age: 64
End: 2019-08-28
Payer: COMMERCIAL

## 2019-08-28 ENCOUNTER — LAB VISIT (OUTPATIENT)
Dept: LAB | Facility: HOSPITAL | Age: 64
End: 2019-08-28
Attending: INTERNAL MEDICINE
Payer: COMMERCIAL

## 2019-08-28 VITALS
BODY MASS INDEX: 31.85 KG/M2 | WEIGHT: 222.44 LBS | HEART RATE: 82 BPM | SYSTOLIC BLOOD PRESSURE: 130 MMHG | DIASTOLIC BLOOD PRESSURE: 50 MMHG | HEIGHT: 70 IN | OXYGEN SATURATION: 98 %

## 2019-08-28 DIAGNOSIS — N17.9 AKI (ACUTE KIDNEY INJURY): ICD-10-CM

## 2019-08-28 DIAGNOSIS — L03.211 FACIAL CELLULITIS: Primary | ICD-10-CM

## 2019-08-28 DIAGNOSIS — L03.211 FACIAL CELLULITIS: ICD-10-CM

## 2019-08-28 LAB
ANION GAP SERPL CALC-SCNC: 10 MMOL/L (ref 8–16)
BASOPHILS # BLD AUTO: 0.04 K/UL (ref 0–0.2)
BASOPHILS NFR BLD: 0.8 % (ref 0–1.9)
BUN SERPL-MCNC: 23 MG/DL (ref 8–23)
CALCIUM SERPL-MCNC: 9.1 MG/DL (ref 8.7–10.5)
CHLORIDE SERPL-SCNC: 106 MMOL/L (ref 95–110)
CO2 SERPL-SCNC: 24 MMOL/L (ref 23–29)
CREAT SERPL-MCNC: 1.4 MG/DL (ref 0.5–1.4)
DIFFERENTIAL METHOD: ABNORMAL
EOSINOPHIL # BLD AUTO: 0.1 K/UL (ref 0–0.5)
EOSINOPHIL NFR BLD: 2.3 % (ref 0–8)
ERYTHROCYTE [DISTWIDTH] IN BLOOD BY AUTOMATED COUNT: 12.7 % (ref 11.5–14.5)
EST. GFR  (AFRICAN AMERICAN): >60 ML/MIN/1.73 M^2
EST. GFR  (NON AFRICAN AMERICAN): 52 ML/MIN/1.73 M^2
GLUCOSE SERPL-MCNC: 116 MG/DL (ref 70–110)
HCT VFR BLD AUTO: 37.4 % (ref 40–54)
HGB BLD-MCNC: 11.9 G/DL (ref 14–18)
IMM GRANULOCYTES # BLD AUTO: 0.02 K/UL (ref 0–0.04)
IMM GRANULOCYTES NFR BLD AUTO: 0.4 % (ref 0–0.5)
LYMPHOCYTES # BLD AUTO: 2.2 K/UL (ref 1–4.8)
LYMPHOCYTES NFR BLD: 42.1 % (ref 18–48)
MCH RBC QN AUTO: 30.1 PG (ref 27–31)
MCHC RBC AUTO-ENTMCNC: 31.8 G/DL (ref 32–36)
MCV RBC AUTO: 95 FL (ref 82–98)
MONOCYTES # BLD AUTO: 0.4 K/UL (ref 0.3–1)
MONOCYTES NFR BLD: 8 % (ref 4–15)
NEUTROPHILS # BLD AUTO: 2.4 K/UL (ref 1.8–7.7)
NEUTROPHILS NFR BLD: 46.4 % (ref 38–73)
NRBC BLD-RTO: 0 /100 WBC
PLATELET # BLD AUTO: 236 K/UL (ref 150–350)
PMV BLD AUTO: 9.8 FL (ref 9.2–12.9)
POTASSIUM SERPL-SCNC: 3.7 MMOL/L (ref 3.5–5.1)
RBC # BLD AUTO: 3.95 M/UL (ref 4.6–6.2)
SODIUM SERPL-SCNC: 140 MMOL/L (ref 136–145)
WBC # BLD AUTO: 5.13 K/UL (ref 3.9–12.7)

## 2019-08-28 PROCEDURE — 99999 PR PBB SHADOW E&M-EST. PATIENT-LVL III: CPT | Mod: PBBFAC,,, | Performed by: INTERNAL MEDICINE

## 2019-08-28 PROCEDURE — 99999 PR PBB SHADOW E&M-EST. PATIENT-LVL III: ICD-10-PCS | Mod: PBBFAC,,, | Performed by: INTERNAL MEDICINE

## 2019-08-28 PROCEDURE — 3078F DIAST BP <80 MM HG: CPT | Mod: CPTII,S$GLB,, | Performed by: INTERNAL MEDICINE

## 2019-08-28 PROCEDURE — 85025 COMPLETE CBC W/AUTO DIFF WBC: CPT

## 2019-08-28 PROCEDURE — 99214 OFFICE O/P EST MOD 30 MIN: CPT | Mod: S$GLB,,, | Performed by: INTERNAL MEDICINE

## 2019-08-28 PROCEDURE — 3075F PR MOST RECENT SYSTOLIC BLOOD PRESS GE 130-139MM HG: ICD-10-PCS | Mod: CPTII,S$GLB,, | Performed by: INTERNAL MEDICINE

## 2019-08-28 PROCEDURE — 36415 COLL VENOUS BLD VENIPUNCTURE: CPT | Mod: PO

## 2019-08-28 PROCEDURE — 3078F PR MOST RECENT DIASTOLIC BLOOD PRESSURE < 80 MM HG: ICD-10-PCS | Mod: CPTII,S$GLB,, | Performed by: INTERNAL MEDICINE

## 2019-08-28 PROCEDURE — 3075F SYST BP GE 130 - 139MM HG: CPT | Mod: CPTII,S$GLB,, | Performed by: INTERNAL MEDICINE

## 2019-08-28 PROCEDURE — 1101F PR PT FALLS ASSESS DOC 0-1 FALLS W/OUT INJ PAST YR: ICD-10-PCS | Mod: CPTII,S$GLB,, | Performed by: INTERNAL MEDICINE

## 2019-08-28 PROCEDURE — 99214 PR OFFICE/OUTPT VISIT, EST, LEVL IV, 30-39 MIN: ICD-10-PCS | Mod: S$GLB,,, | Performed by: INTERNAL MEDICINE

## 2019-08-28 PROCEDURE — 1101F PT FALLS ASSESS-DOCD LE1/YR: CPT | Mod: CPTII,S$GLB,, | Performed by: INTERNAL MEDICINE

## 2019-08-28 PROCEDURE — 80048 BASIC METABOLIC PNL TOTAL CA: CPT

## 2019-08-28 RX ORDER — DOXYCYCLINE 100 MG/1
100 CAPSULE ORAL 2 TIMES DAILY
Qty: 20 CAPSULE | Refills: 0 | Status: SHIPPED | OUTPATIENT
Start: 2019-08-28 | End: 2019-09-07

## 2019-08-28 RX ORDER — AZELASTINE 1 MG/ML
2 SPRAY, METERED NASAL 2 TIMES DAILY
Qty: 30 ML | Refills: 1 | Status: SHIPPED | OUTPATIENT
Start: 2019-08-28 | End: 2021-12-23

## 2019-08-28 RX ORDER — DOXYCYCLINE 100 MG/1
100 CAPSULE ORAL 2 TIMES DAILY
Qty: 20 CAPSULE | Refills: 0 | Status: SHIPPED | OUTPATIENT
Start: 2019-08-28 | End: 2019-08-28 | Stop reason: SDUPTHER

## 2019-08-28 NOTE — PROGRESS NOTES
REASON FOR VISIT:  This is a 64-year-old male.  Since last Friday, 8/23/2019, he   woke up with swelling around his left eye that was painful.  He went to [facility] and was diagnosed with conjunctivitis.  The comments from the nurse did say   exudate with injected conjunctiva, but he does not recall having any drainage   and the eye did not look red.  Nevertheless, erythromycin ointment was   prescribed.  Since then, it actually has fluctuated where at times the swelling   and discomfort went down, but when he wakes up, he feels it.  He is also feeling   discomfort on the maxillary side of his face and then toward his ear or   preauricular region.  He does not have a sore throat.  There is no ear pain.  No   nasal or head congestion.  This morning when he was brushing his teeth, he   noticed some bleeding from his gum.  Two days prior on 8/21/2019, he went to the   emergency room feeling dizzy, and was diagnosed with dehydration.  It was noted   that creatinine increased from 1.6 to 2.0.  He remembers a couple of days   before working as a , he was working in a very hot environment and may   not have been drinking as much.  Since then, he has definitely increased his   fluid intake.    PAST MEDICAL HISTORY:  1. Hypertension.  2. Hyperlipidemia.  3. BPH.  4. Chronic kidney disease, stage II to III.    MEDICATIONS:  Outlined per [employer].  Allopurinol, benazepril 40 mg, Zetia 10 and   fenofibrate 160.    PHYSICAL EXAMINATION:  VITAL SIGNS:  His weight is 222, pulse rate 80, blood pressure 120/72.  HEENT:  Oropharynx, no lesions.  Posterior pharynx looks normal.  There are no   abnormalities involving the gum line.  There is no adenopathy.  He has   tenderness and some hyperemia over the face on the left side and feels warm   compared to the right.  The conjunctiva looks normal.  LUNGS:  Clear.  HEART:  Regular rate and rhythm.    IMPRESSION:  Facial cellulitis.    PLAN:  We will give doxycycline 100 mg twice  a day for the next 10 days and we   will also repeat a basic metabolic profile due to acute kidney injury.        AILEEN/ELIZABETH  dd: 08/28/2019 10:42:40 (CDT)  td: 08/29/2019 04:03:08 (CDT)  Doc ID   #[id number]  Job ID #[id number]    CC:

## 2019-09-10 ENCOUNTER — TELEPHONE (OUTPATIENT)
Dept: INTERNAL MEDICINE | Facility: CLINIC | Age: 64
End: 2019-09-10

## 2019-09-10 NOTE — TELEPHONE ENCOUNTER
----- Message from Argelia Healy sent at 9/10/2019  9:23 AM CDT -----  Contact: patient [phone]  Patient is calling to ask about lab results from 3 weeks ago. Please call patient today.

## 2019-10-29 ENCOUNTER — HOSPITAL ENCOUNTER (EMERGENCY)
Facility: HOSPITAL | Age: 64
Discharge: HOME | End: 2019-10-29
Attending: SURGERY
Payer: MEDICARE

## 2019-10-29 VITALS
HEART RATE: 90 BPM | TEMPERATURE: 98 F | RESPIRATION RATE: 18 BRPM | SYSTOLIC BLOOD PRESSURE: 132 MMHG | DIASTOLIC BLOOD PRESSURE: 79 MMHG | WEIGHT: 221.56 LBS | BODY MASS INDEX: 31.79 KG/M2 | OXYGEN SATURATION: 100 %

## 2019-10-29 DIAGNOSIS — R19.7 DIARRHEA, UNSPECIFIED TYPE: Primary | ICD-10-CM

## 2019-10-29 DIAGNOSIS — R53.1 WEAKNESS: ICD-10-CM

## 2019-10-29 DIAGNOSIS — R07.9 CHEST PAIN: ICD-10-CM

## 2019-10-29 DIAGNOSIS — R53.83 FATIGUE: ICD-10-CM

## 2019-10-29 LAB
ALBUMIN SERPL BCP-MCNC: 3.8 G/DL (ref 3.5–5.2)
ALP SERPL-CCNC: 65 U/L (ref 55–135)
ALT SERPL W/O P-5'-P-CCNC: 22 U/L (ref 10–44)
ANION GAP SERPL CALC-SCNC: 10 MMOL/L (ref 8–16)
AST SERPL-CCNC: 16 U/L (ref 10–40)
BASOPHILS # BLD AUTO: 0.03 K/UL (ref 0–0.2)
BASOPHILS NFR BLD: 0.5 % (ref 0–1.9)
BILIRUB SERPL-MCNC: 0.5 MG/DL (ref 0.1–1)
BILIRUB UR QL STRIP: NEGATIVE
BNP SERPL-MCNC: <10 PG/ML (ref 0–99)
BUN SERPL-MCNC: 16 MG/DL (ref 8–23)
CALCIUM SERPL-MCNC: 9 MG/DL (ref 8.7–10.5)
CHLORIDE SERPL-SCNC: 104 MMOL/L (ref 95–110)
CK MB SERPL-MCNC: 1.5 NG/ML (ref 0.1–6.5)
CK MB SERPL-RTO: 1.5 % (ref 0–5)
CK SERPL-CCNC: 100 U/L (ref 20–200)
CLARITY UR: CLEAR
CO2 SERPL-SCNC: 24 MMOL/L (ref 23–29)
COLOR UR: YELLOW
CREAT SERPL-MCNC: 1.4 MG/DL (ref 0.5–1.4)
DIFFERENTIAL METHOD: ABNORMAL
EOSINOPHIL # BLD AUTO: 0.2 K/UL (ref 0–0.5)
EOSINOPHIL NFR BLD: 2.6 % (ref 0–8)
ERYTHROCYTE [DISTWIDTH] IN BLOOD BY AUTOMATED COUNT: 12.2 % (ref 11.5–14.5)
EST. GFR  (AFRICAN AMERICAN): >60 ML/MIN/1.73 M^2
EST. GFR  (NON AFRICAN AMERICAN): 52 ML/MIN/1.73 M^2
GLUCOSE SERPL-MCNC: 173 MG/DL (ref 70–110)
GLUCOSE UR QL STRIP: NEGATIVE
HCT VFR BLD AUTO: 40.4 % (ref 40–54)
HGB BLD-MCNC: 13.6 G/DL (ref 14–18)
HGB UR QL STRIP: NEGATIVE
IMM GRANULOCYTES # BLD AUTO: 0.01 K/UL (ref 0–0.04)
IMM GRANULOCYTES NFR BLD AUTO: 0.2 % (ref 0–0.5)
INFLUENZA A, MOLECULAR: NEGATIVE
INFLUENZA B, MOLECULAR: NEGATIVE
KETONES UR QL STRIP: NEGATIVE
LEUKOCYTE ESTERASE UR QL STRIP: NEGATIVE
LIPASE SERPL-CCNC: 42 U/L (ref 4–60)
LYMPHOCYTES # BLD AUTO: 2.5 K/UL (ref 1–4.8)
LYMPHOCYTES NFR BLD: 43.3 % (ref 18–48)
MCH RBC QN AUTO: 30.8 PG (ref 27–31)
MCHC RBC AUTO-ENTMCNC: 33.7 G/DL (ref 32–36)
MCV RBC AUTO: 91 FL (ref 82–98)
MONOCYTES # BLD AUTO: 0.5 K/UL (ref 0.3–1)
MONOCYTES NFR BLD: 8.3 % (ref 4–15)
NEUTROPHILS # BLD AUTO: 2.6 K/UL (ref 1.8–7.7)
NEUTROPHILS NFR BLD: 45.1 % (ref 38–73)
NITRITE UR QL STRIP: NEGATIVE
NRBC BLD-RTO: 0 /100 WBC
PH UR STRIP: 6 [PH] (ref 5–8)
PLATELET # BLD AUTO: 181 K/UL (ref 150–350)
PMV BLD AUTO: 9.2 FL (ref 9.2–12.9)
POTASSIUM SERPL-SCNC: 3.5 MMOL/L (ref 3.5–5.1)
PROT SERPL-MCNC: 6.9 G/DL (ref 6–8.4)
PROT UR QL STRIP: NEGATIVE
RBC # BLD AUTO: 4.42 M/UL (ref 4.6–6.2)
SODIUM SERPL-SCNC: 138 MMOL/L (ref 136–145)
SP GR UR STRIP: 1.01 (ref 1–1.03)
SPECIMEN SOURCE: NORMAL
TROPONIN I SERPL DL<=0.01 NG/ML-MCNC: <0.006 NG/ML (ref 0–0.03)
URN SPEC COLLECT METH UR: NORMAL
UROBILINOGEN UR STRIP-ACNC: NEGATIVE EU/DL
WBC # BLD AUTO: 5.78 K/UL (ref 3.9–12.7)

## 2019-10-29 PROCEDURE — 84484 ASSAY OF TROPONIN QUANT: CPT | Mod: HCNC

## 2019-10-29 PROCEDURE — 93005 ELECTROCARDIOGRAM TRACING: CPT | Mod: HCNC

## 2019-10-29 PROCEDURE — 82553 CREATINE MB FRACTION: CPT | Mod: HCNC

## 2019-10-29 PROCEDURE — 83880 ASSAY OF NATRIURETIC PEPTIDE: CPT | Mod: HCNC

## 2019-10-29 PROCEDURE — 63600175 PHARM REV CODE 636 W HCPCS: Performed by: SURGERY

## 2019-10-29 PROCEDURE — 36415 COLL VENOUS BLD VENIPUNCTURE: CPT | Mod: HCNC

## 2019-10-29 PROCEDURE — 87502 INFLUENZA DNA AMP PROBE: CPT | Mod: HCNC

## 2019-10-29 PROCEDURE — 81003 URINALYSIS AUTO W/O SCOPE: CPT | Mod: HCNC

## 2019-10-29 PROCEDURE — 85025 COMPLETE CBC W/AUTO DIFF WBC: CPT | Mod: HCNC

## 2019-10-29 PROCEDURE — 80053 COMPREHEN METABOLIC PANEL: CPT | Mod: HCNC

## 2019-10-29 PROCEDURE — 96360 HYDRATION IV INFUSION INIT: CPT | Mod: HCNC

## 2019-10-29 PROCEDURE — 93010 EKG 12-LEAD: ICD-10-PCS | Mod: HCNC,,, | Performed by: INTERNAL MEDICINE

## 2019-10-29 PROCEDURE — 82550 ASSAY OF CK (CPK): CPT | Mod: HCNC

## 2019-10-29 PROCEDURE — 99284 EMERGENCY DEPT VISIT MOD MDM: CPT | Mod: 25,HCNC

## 2019-10-29 PROCEDURE — 93010 ELECTROCARDIOGRAM REPORT: CPT | Mod: HCNC,,, | Performed by: INTERNAL MEDICINE

## 2019-10-29 PROCEDURE — 83690 ASSAY OF LIPASE: CPT | Mod: HCNC

## 2019-10-29 RX ORDER — SODIUM CHLORIDE 9 MG/ML
1000 INJECTION, SOLUTION INTRAVENOUS
Status: COMPLETED | OUTPATIENT
Start: 2019-10-29 | End: 2019-10-29

## 2019-10-29 RX ADMIN — SODIUM CHLORIDE 1000 ML: 0.9 INJECTION, SOLUTION INTRAVENOUS at 08:10

## 2019-10-29 NOTE — ED PROVIDER NOTES
Encounter Date: 10/29/2019       History     Chief Complaint   Patient presents with    Fatigue     PATIENT IS 64-YEAR-OLD WHITE MALE WITH ONSET OF WEAKNESS AND SOME ANTERIOR CHEST PAIN EARLIER THIS MORNING.  HE HAD 1 EPISODE OF DIARRHEA.  SOME NAUSEA BUT NO VOMITING WAS REPORTED.  PATIENT REPORTS NO PROBLEMS LAST NIGHT.  HE WAS TREATED FOR DEHYDRATION ABOUT 2 MONTHS AGO WITH SIMILAR SYMPTOMS.  NO PREVIOUS KNOWN CARDIAC DISEASE.        Review of patient's allergies indicates:   Allergen Reactions    1,3-butylene glycol Shortness Of Breath    Ciprofloxacin Nausea Only and Other (See Comments)    Levofloxacin Other (See Comments)    Sulfacetamide sodium Other (See Comments)     Past Medical History:   Diagnosis Date    Agatston CAC score, >400 4/11/2016    813     Agatston CAC score, >400     Allergy     Back pain     BPH (benign prostatic hypertrophy)     Degenerative disc disease     GERD (gastroesophageal reflux disease)     Hyperlipidemia     Hypertension     Kidney stones     Meniere disease      Past Surgical History:   Procedure Laterality Date    BACK SURGERY      COLONOSCOPY N/A 4/7/2016    Procedure: COLONOSCOPY;  Surgeon: Esau Helms MD;  Location: [address]);  Service: Endoscopy;  Laterality: N/A;  Last procedure by Scooter 4/6/2011    EYE SURGERY Bilateral     bilateral    SPINE SURGERY  2007    3 lumbar surgeries     Family History   Problem Relation Age of Onset    Hypertension Father     Parkinsonism Father     Heart disease Father     Heart disease Mother     Kidney disease Mother     Glaucoma Mother     Heart disease Sister     Anuerysm Brother     Cancer Maternal Uncle         pancreatic    No Known Problems Sister     No Known Problems Sister     No Known Problems Son     Melanoma Neg Hx      Social History     Tobacco Use    Smoking status: Never Smoker    Smokeless tobacco: Never Used   Substance Use Topics    Alcohol use: No     Alcohol/week:  0.0 standard drinks    Drug use: No     Review of Systems   Constitutional: Negative for fever.   HENT: Negative for sore throat.    Respiratory: Negative for shortness of breath.    Cardiovascular: Positive for chest pain. Negative for palpitations.   Gastrointestinal: Positive for diarrhea and nausea.   Genitourinary: Negative for dysuria.   Musculoskeletal: Negative for back pain.   Skin: Negative for rash.   Neurological: Positive for weakness.   Hematological: Does not bruise/bleed easily.       Physical Exam     Initial Vitals   BP Pulse Resp Temp SpO2   10/29/19 0759 10/29/19 0804 10/29/19 0759 10/29/19 0759 10/29/19 0759   (!) 154/95 107 18 97.6 °F (36.4 °C) 100 %      MAP       --                Physical Exam    Nursing note and vitals reviewed.  Constitutional: He appears well-developed and well-nourished.   HENT:   Head: Normocephalic and atraumatic.   Mouth/Throat: Oropharynx is clear and moist.   Eyes: EOM are normal. Pupils are equal, round, and reactive to light.   Neck: Normal range of motion. Neck supple.   Cardiovascular: Normal rate.   Pulmonary/Chest: Breath sounds normal. No respiratory distress. He has no wheezes. He has no rhonchi. He has no rales.   Abdominal: Soft. He exhibits no distension. There is no tenderness. There is no rebound and no guarding.   Musculoskeletal: Normal range of motion. He exhibits no edema or tenderness.   Neurological: He is alert and oriented to person, place, and time. He has normal strength.   Skin: Skin is warm and dry.   Psychiatric: He has a normal mood and affect. Thought content normal.         ED Course   Procedures  Labs Reviewed - No data to display       Imaging Results    None                               Clinical Impression:       ICD-10-CM ICD-9-CM   1. Diarrhea, unspecified type R19.7 787.91   2. Fatigue R53.83 780.79   3. Chest pain R07.9 786.50   4. Weakness R53.1 780.79         Disposition:   Disposition: Discharged  Condition:  Stable                        Corey Deleon Jr., MD  10/29/19 0963

## 2019-11-06 DIAGNOSIS — N18.2 CHRONIC KIDNEY DISEASE, STAGE II (MILD): ICD-10-CM

## 2019-11-06 DIAGNOSIS — I10 ESSENTIAL HYPERTENSION: ICD-10-CM

## 2019-11-06 RX ORDER — BENAZEPRIL HYDROCHLORIDE 40 MG/1
TABLET ORAL
Qty: 90 TABLET | Refills: 1 | Status: SHIPPED | OUTPATIENT
Start: 2019-11-06 | End: 2020-06-07

## 2019-12-23 RX ORDER — EZETIMIBE 10 MG/1
10 TABLET ORAL DAILY
Qty: 90 TABLET | Refills: 3 | Status: SHIPPED | OUTPATIENT
Start: 2019-12-23 | End: 2020-06-29 | Stop reason: SDUPTHER

## 2019-12-23 RX ORDER — ALLOPURINOL 100 MG/1
100 TABLET ORAL DAILY
Qty: 90 TABLET | Refills: 3 | Status: SHIPPED | OUTPATIENT
Start: 2019-12-23 | End: 2020-06-29 | Stop reason: SDUPTHER

## 2020-02-19 ENCOUNTER — PES CALL (OUTPATIENT)
Dept: ADMINISTRATIVE | Facility: CLINIC | Age: 65
End: 2020-02-19

## 2020-05-28 ENCOUNTER — TELEPHONE (OUTPATIENT)
Dept: FAMILY MEDICINE | Facility: CLINIC | Age: 65
End: 2020-05-28

## 2020-05-28 NOTE — TELEPHONE ENCOUNTER
----- Message from Henna Fink sent at 2020 10:27 AM CDT -----  Contact: self  Demarco Means  MRN: [id number]  : 1955  PCP: Marky Mills  Home Phone      [phone]  Work Phone      Not on file.  Mobile          [phone]      MESSAGE:   Patient states Hermes Leon was recommended to him by one of Docs patients, he would like to see if doc would be his new PCP    Peoples choice- Medicare    Phone:  [phone]

## 2020-05-28 NOTE — TELEPHONE ENCOUNTER
Patient states Hermes Leon was recommended to him by one of Docs patients, ( Jonathan Bennett) he would like to see if doc would be his new PCP    Are you accepting any new patients?

## 2020-06-29 ENCOUNTER — OFFICE VISIT (OUTPATIENT)
Dept: FAMILY MEDICINE | Facility: CLINIC | Age: 65
End: 2020-06-29
Payer: MEDICARE

## 2020-06-29 VITALS
TEMPERATURE: 98 F | SYSTOLIC BLOOD PRESSURE: 128 MMHG | DIASTOLIC BLOOD PRESSURE: 80 MMHG | WEIGHT: 226.19 LBS | BODY MASS INDEX: 31.67 KG/M2 | HEIGHT: 71 IN | HEART RATE: 88 BPM | RESPIRATION RATE: 18 BRPM

## 2020-06-29 DIAGNOSIS — Z12.5 SCREENING FOR PROSTATE CANCER: ICD-10-CM

## 2020-06-29 DIAGNOSIS — E78.5 HYPERLIPIDEMIA, UNSPECIFIED HYPERLIPIDEMIA TYPE: Primary | ICD-10-CM

## 2020-06-29 DIAGNOSIS — N18.2 CHRONIC KIDNEY DISEASE, STAGE II (MILD): ICD-10-CM

## 2020-06-29 DIAGNOSIS — E55.9 VITAMIN D INSUFFICIENCY: ICD-10-CM

## 2020-06-29 DIAGNOSIS — I10 ESSENTIAL HYPERTENSION: ICD-10-CM

## 2020-06-29 LAB
ALBUMIN SERPL BCP-MCNC: 3.9 G/DL (ref 3.5–5.2)
ALP SERPL-CCNC: 70 U/L (ref 55–135)
ALT SERPL W/O P-5'-P-CCNC: 27 U/L (ref 10–44)
ANION GAP SERPL CALC-SCNC: 11 MMOL/L (ref 8–16)
AST SERPL-CCNC: 21 U/L (ref 10–40)
BASOPHILS # BLD AUTO: 0.03 K/UL (ref 0–0.2)
BASOPHILS NFR BLD: 0.5 % (ref 0–1.9)
BILIRUB SERPL-MCNC: 0.4 MG/DL (ref 0.1–1)
BUN SERPL-MCNC: 16 MG/DL (ref 8–23)
CALCIUM SERPL-MCNC: 10.1 MG/DL (ref 8.7–10.5)
CHLORIDE SERPL-SCNC: 105 MMOL/L (ref 95–110)
CHOLEST SERPL-MCNC: 190 MG/DL (ref 120–199)
CHOLEST/HDLC SERPL: 7 {RATIO} (ref 2–5)
CO2 SERPL-SCNC: 25 MMOL/L (ref 23–29)
COMPLEXED PSA SERPL-MCNC: 1 NG/ML (ref 0–4)
CREAT SERPL-MCNC: 1.4 MG/DL (ref 0.5–1.4)
DIFFERENTIAL METHOD: ABNORMAL
EOSINOPHIL # BLD AUTO: 0.1 K/UL (ref 0–0.5)
EOSINOPHIL NFR BLD: 2.2 % (ref 0–8)
ERYTHROCYTE [DISTWIDTH] IN BLOOD BY AUTOMATED COUNT: 12.5 % (ref 11.5–14.5)
EST. GFR  (AFRICAN AMERICAN): 60 ML/MIN/1.73 M^2
EST. GFR  (NON AFRICAN AMERICAN): 52 ML/MIN/1.73 M^2
GLUCOSE SERPL-MCNC: 103 MG/DL (ref 70–110)
HCT VFR BLD AUTO: 42.3 % (ref 40–54)
HDLC SERPL-MCNC: 27 MG/DL (ref 40–75)
HDLC SERPL: 14.2 % (ref 20–50)
HGB BLD-MCNC: 14 G/DL (ref 14–18)
IMM GRANULOCYTES # BLD AUTO: 0.01 K/UL (ref 0–0.04)
IMM GRANULOCYTES NFR BLD AUTO: 0.2 % (ref 0–0.5)
LDLC SERPL CALC-MCNC: ABNORMAL MG/DL (ref 63–159)
LYMPHOCYTES # BLD AUTO: 2.5 K/UL (ref 1–4.8)
LYMPHOCYTES NFR BLD: 44.8 % (ref 18–48)
MCH RBC QN AUTO: 30.6 PG (ref 27–31)
MCHC RBC AUTO-ENTMCNC: 33.1 G/DL (ref 32–36)
MCV RBC AUTO: 93 FL (ref 82–98)
MONOCYTES # BLD AUTO: 0.6 K/UL (ref 0.3–1)
MONOCYTES NFR BLD: 10.6 % (ref 4–15)
NEUTROPHILS # BLD AUTO: 2.3 K/UL (ref 1.8–7.7)
NEUTROPHILS NFR BLD: 41.7 % (ref 38–73)
NONHDLC SERPL-MCNC: 163 MG/DL
NRBC BLD-RTO: 0 /100 WBC
PLATELET # BLD AUTO: 229 K/UL (ref 150–350)
PMV BLD AUTO: 9.9 FL (ref 9.2–12.9)
POTASSIUM SERPL-SCNC: 3.9 MMOL/L (ref 3.5–5.1)
PROT SERPL-MCNC: 7.5 G/DL (ref 6–8.4)
RBC # BLD AUTO: 4.57 M/UL (ref 4.6–6.2)
SODIUM SERPL-SCNC: 141 MMOL/L (ref 136–145)
TRIGL SERPL-MCNC: 499 MG/DL (ref 30–150)
TSH SERPL DL<=0.005 MIU/L-ACNC: 0.9 UIU/ML (ref 0.4–4)
WBC # BLD AUTO: 5.47 K/UL (ref 3.9–12.7)

## 2020-06-29 PROCEDURE — 36415 PR COLLECTION VENOUS BLOOD,VENIPUNCTURE: ICD-10-PCS | Mod: S$GLB,,, | Performed by: FAMILY MEDICINE

## 2020-06-29 PROCEDURE — 99999 PR PBB SHADOW E&M-EST. PATIENT-LVL IV: CPT | Mod: PBBFAC,,, | Performed by: FAMILY MEDICINE

## 2020-06-29 PROCEDURE — 1125F PR PAIN SEVERITY QUANTIFIED, PAIN PRESENT: ICD-10-PCS | Mod: S$GLB,,, | Performed by: FAMILY MEDICINE

## 2020-06-29 PROCEDURE — 99214 OFFICE O/P EST MOD 30 MIN: CPT | Mod: 25,S$GLB,, | Performed by: FAMILY MEDICINE

## 2020-06-29 PROCEDURE — 1101F PT FALLS ASSESS-DOCD LE1/YR: CPT | Mod: CPTII,S$GLB,, | Performed by: FAMILY MEDICINE

## 2020-06-29 PROCEDURE — 99499 RISK ADDL DX/OHS AUDIT: ICD-10-PCS | Mod: S$GLB,,, | Performed by: FAMILY MEDICINE

## 2020-06-29 PROCEDURE — 82306 VITAMIN D 25 HYDROXY: CPT

## 2020-06-29 PROCEDURE — 99999 PR PBB SHADOW E&M-EST. PATIENT-LVL IV: ICD-10-PCS | Mod: PBBFAC,,, | Performed by: FAMILY MEDICINE

## 2020-06-29 PROCEDURE — 99499 UNLISTED E&M SERVICE: CPT | Mod: S$GLB,,, | Performed by: FAMILY MEDICINE

## 2020-06-29 PROCEDURE — 3074F PR MOST RECENT SYSTOLIC BLOOD PRESSURE < 130 MM HG: ICD-10-PCS | Mod: CPTII,S$GLB,, | Performed by: FAMILY MEDICINE

## 2020-06-29 PROCEDURE — 84153 ASSAY OF PSA TOTAL: CPT

## 2020-06-29 PROCEDURE — 3008F PR BODY MASS INDEX (BMI) DOCUMENTED: ICD-10-PCS | Mod: CPTII,S$GLB,, | Performed by: FAMILY MEDICINE

## 2020-06-29 PROCEDURE — 80061 LIPID PANEL: CPT

## 2020-06-29 PROCEDURE — 84443 ASSAY THYROID STIM HORMONE: CPT

## 2020-06-29 PROCEDURE — 3079F PR MOST RECENT DIASTOLIC BLOOD PRESSURE 80-89 MM HG: ICD-10-PCS | Mod: CPTII,S$GLB,, | Performed by: FAMILY MEDICINE

## 2020-06-29 PROCEDURE — 3008F BODY MASS INDEX DOCD: CPT | Mod: CPTII,S$GLB,, | Performed by: FAMILY MEDICINE

## 2020-06-29 PROCEDURE — 1101F PR PT FALLS ASSESS DOC 0-1 FALLS W/OUT INJ PAST YR: ICD-10-PCS | Mod: CPTII,S$GLB,, | Performed by: FAMILY MEDICINE

## 2020-06-29 PROCEDURE — 36415 COLL VENOUS BLD VENIPUNCTURE: CPT | Mod: S$GLB,,, | Performed by: FAMILY MEDICINE

## 2020-06-29 PROCEDURE — 85025 COMPLETE CBC W/AUTO DIFF WBC: CPT

## 2020-06-29 PROCEDURE — 3074F SYST BP LT 130 MM HG: CPT | Mod: CPTII,S$GLB,, | Performed by: FAMILY MEDICINE

## 2020-06-29 PROCEDURE — 1125F AMNT PAIN NOTED PAIN PRSNT: CPT | Mod: S$GLB,,, | Performed by: FAMILY MEDICINE

## 2020-06-29 PROCEDURE — 80053 COMPREHEN METABOLIC PANEL: CPT

## 2020-06-29 PROCEDURE — 1159F PR MEDICATION LIST DOCUMENTED IN MEDICAL RECORD: ICD-10-PCS | Mod: S$GLB,,, | Performed by: FAMILY MEDICINE

## 2020-06-29 PROCEDURE — 1159F MED LIST DOCD IN RCRD: CPT | Mod: S$GLB,,, | Performed by: FAMILY MEDICINE

## 2020-06-29 PROCEDURE — 99214 PR OFFICE/OUTPT VISIT, EST, LEVL IV, 30-39 MIN: ICD-10-PCS | Mod: 25,S$GLB,, | Performed by: FAMILY MEDICINE

## 2020-06-29 PROCEDURE — 3079F DIAST BP 80-89 MM HG: CPT | Mod: CPTII,S$GLB,, | Performed by: FAMILY MEDICINE

## 2020-06-29 RX ORDER — ALLOPURINOL 100 MG/1
100 TABLET ORAL DAILY
Qty: 90 TABLET | Refills: 3 | Status: SHIPPED | OUTPATIENT
Start: 2020-06-29 | End: 2021-06-29

## 2020-06-29 RX ORDER — EZETIMIBE 10 MG/1
10 TABLET ORAL DAILY
Qty: 90 TABLET | Refills: 1 | Status: SHIPPED | OUTPATIENT
Start: 2020-06-29 | End: 2021-12-23

## 2020-06-29 RX ORDER — BENAZEPRIL HYDROCHLORIDE 40 MG/1
40 TABLET ORAL DAILY
Qty: 90 TABLET | Refills: 3 | Status: SHIPPED | OUTPATIENT
Start: 2020-06-29 | End: 2021-12-23

## 2020-06-29 NOTE — PROGRESS NOTES
Subjective:       Patient ID: Demarco Means is a 65 y.o. male.    Chief Complaint: Establish Jared    Pt is a 65 y.o. male who presents for check up for HTN, renal stones and lipidemia. Doing well on current meds. Denies any side effects. Prevention is not up to date.    Review of Systems   Constitutional: Negative for appetite change.   HENT: Negative for congestion, ear pain, sneezing and sore throat.    Eyes: Negative for redness and visual disturbance.   Respiratory: Negative for cough, chest tightness and stridor.    Cardiovascular: Negative for chest pain.   Gastrointestinal: Negative for abdominal pain, blood in stool, diarrhea, nausea and vomiting.   Genitourinary: Negative for difficulty urinating, dysuria and hematuria.        Nocturia x 0-1   Musculoskeletal: Positive for back pain. Negative for arthralgias, joint swelling, myalgias and neck pain.        3 back surgeries with leg numbness   Skin: Negative for rash.   Neurological: Negative for dizziness.        Broken sleep- 4 hrs   Psychiatric/Behavioral: Negative for agitation. The patient is not nervous/anxious.        Objective:      Physical Exam  Constitutional:       Appearance: He is well-developed.   HENT:      Head: Normocephalic.   Eyes:      Pupils: Pupils are equal, round, and reactive to light.   Neck:      Musculoskeletal: Normal range of motion and neck supple. Muscular tenderness present.      Thyroid: No thyromegaly.   Cardiovascular:      Rate and Rhythm: Normal rate and regular rhythm.      Heart sounds: No murmur. No friction rub.   Pulmonary:      Effort: Pulmonary effort is normal. No respiratory distress.      Breath sounds: No wheezing.   Abdominal:      Tenderness: There is no abdominal tenderness. There is no guarding or rebound.   Genitourinary:     Comments: 35 gm  smooth  Musculoskeletal: Normal range of motion.         General: No tenderness.   Lymphadenopathy:      Cervical: No cervical adenopathy.   Skin:      General: Skin is warm and dry.   Neurological:      Mental Status: He is alert and oriented to person, place, and time.      Cranial Nerves: No cranial nerve deficit.      Deep Tendon Reflexes: Reflexes are normal and symmetric.   Psychiatric:         Thought Content: Thought content normal.         Judgment: Judgment normal.         Assessment:       1. Hyperlipidemia, unspecified hyperlipidemia type    2. Screening for prostate cancer    3. Vitamin D insufficiency    4. Chronic kidney disease, stage II (mild)    5. Essential hypertension        Plan:   Demarco Moseley was seen today for establish care.    Diagnoses and all orders for this visit:    Hyperlipidemia, unspecified hyperlipidemia type  -     Comprehensive metabolic panel; Future  -     Lipid Panel; Future  -     CBC auto differential; Future  -     TSH; Future  -     Comprehensive metabolic panel; Future  -     Lipid Panel; Standing    Screening for prostate cancer  -     PSA, Screening; Future    Vitamin D insufficiency  -     Vitamin D; Future    Chronic kidney disease, stage II (mild)  -     benazepriL (LOTENSIN) 40 MG tablet; Take 1 tablet (40 mg total) by mouth once daily.    Essential hypertension  -     benazepriL (LOTENSIN) 40 MG tablet; Take 1 tablet (40 mg total) by mouth once daily.    Other orders  -     allopurinoL (ZYLOPRIM) 100 MG tablet; Take 1 tablet (100 mg total) by mouth once daily.  -     ezetimibe (ZETIA) 10 mg tablet; Take 1 tablet (10 mg total) by mouth once daily.

## 2020-06-30 LAB — 25(OH)D3+25(OH)D2 SERPL-MCNC: 35 NG/ML (ref 30–96)

## 2020-07-01 ENCOUNTER — TELEPHONE (OUTPATIENT)
Dept: FAMILY MEDICINE | Facility: CLINIC | Age: 65
End: 2020-07-01

## 2020-07-01 NOTE — TELEPHONE ENCOUNTER
----- Message from Salvador Leon MD sent at 6/30/2020  7:35 PM CDT -----  Your lipid levels are too elevated. I want to be certain you ae taking the zetia and try OTC 2 fish oil tabs am & pm-may need stronger meds in thef future     Limit the cholesterol in your diet to less than 300 mg per day.   Fats should contribute no more than 20 to 35% of your daily calories.   Less than 7 to 10% of your calories should come from saturated fat.   Avoid saturated fat products e.g., Butter, some oils, meat, and poultry fat contain a lot of saturated fat.   Check food labels for fat and cholesterol content. Choose the foods with less fat per serving.  Limit the amount of butter and margarine you eat.   Use salad dressings and margarine made with polyunsaturated and monounsaturated fats.   Use egg whites or egg substitutes rather than whole eggs.   Replace whole-milk dairy products with nonfat or low-fat milk, cheese, spreads, and yogurt.  Eat skinless chicken, turkey, fish, and meatless entrees more often than red meat.   Choose lean cuts of meat and trim off all visible fat. Keep portion sizes moderate.   Avoid fatty desserts such as ice cream, cream-filled cakes, and cheesecakes.   Choose fresh fruits, nonfat frozen yogurt, Popsicles, etc.   Reduce the amount of fried foods, vending machine food, and fast food you eat.  Eat fruits and vegetables (especially fresh fruits and leafy vegetables), beans, and whole grains daily. The fiber in these foods helps lower cholesterol.   Look for low-fat or nonfat varieties of the foods you like to eat, or look for substitutes.   You may need to exercise 60 minutes a day to prevent weight gain and 90 minutes a day to lose weight.

## 2020-07-08 ENCOUNTER — TELEPHONE (OUTPATIENT)
Dept: FAMILY MEDICINE | Facility: CLINIC | Age: 65
End: 2020-07-08

## 2020-07-08 NOTE — TELEPHONE ENCOUNTER
----- Message from Shamar Briceño sent at 2020 12:21 PM CDT -----  Contact: Patient  Demarco Means  MRN: [id number]  : 1955  PCP: Marky Mills  Home Phone      [phone]  Work Phone      Not on file.  Mobile          [phone]      MESSAGE:   would like lab results    Call [phone]    PCP: Edward

## 2020-08-14 ENCOUNTER — HOSPITAL ENCOUNTER (EMERGENCY)
Facility: HOSPITAL | Age: 65
Discharge: HOME | End: 2020-08-14
Attending: SURGERY
Payer: MEDICARE

## 2020-08-14 VITALS
DIASTOLIC BLOOD PRESSURE: 78 MMHG | WEIGHT: 220 LBS | RESPIRATION RATE: 17 BRPM | HEART RATE: 69 BPM | HEIGHT: 70 IN | TEMPERATURE: 97 F | OXYGEN SATURATION: 99 % | SYSTOLIC BLOOD PRESSURE: 128 MMHG | BODY MASS INDEX: 31.5 KG/M2

## 2020-08-14 DIAGNOSIS — E86.0 DEHYDRATION: Primary | ICD-10-CM

## 2020-08-14 DIAGNOSIS — R55 SYNCOPE: ICD-10-CM

## 2020-08-14 LAB
ALBUMIN SERPL BCP-MCNC: 3.7 G/DL (ref 3.5–5.2)
ALP SERPL-CCNC: 73 U/L (ref 55–135)
ALT SERPL W/O P-5'-P-CCNC: 24 U/L (ref 10–44)
ANION GAP SERPL CALC-SCNC: 10 MMOL/L (ref 8–16)
AST SERPL-CCNC: 24 U/L (ref 10–40)
BASOPHILS # BLD AUTO: 0.04 K/UL (ref 0–0.2)
BASOPHILS NFR BLD: 0.6 % (ref 0–1.9)
BILIRUB SERPL-MCNC: 0.2 MG/DL (ref 0.1–1)
BNP SERPL-MCNC: <10 PG/ML (ref 0–99)
BUN SERPL-MCNC: 19 MG/DL (ref 8–23)
CALCIUM SERPL-MCNC: 8.7 MG/DL (ref 8.7–10.5)
CHLORIDE SERPL-SCNC: 107 MMOL/L (ref 95–110)
CK MB SERPL-MCNC: 3.7 NG/ML (ref 0.1–6.5)
CK MB SERPL-RTO: 1.3 % (ref 0–5)
CK SERPL-CCNC: 292 U/L (ref 20–200)
CK SERPL-CCNC: 292 U/L (ref 20–200)
CO2 SERPL-SCNC: 23 MMOL/L (ref 23–29)
CREAT SERPL-MCNC: 1.3 MG/DL (ref 0.5–1.4)
DIFFERENTIAL METHOD: ABNORMAL
EOSINOPHIL # BLD AUTO: 0.2 K/UL (ref 0–0.5)
EOSINOPHIL NFR BLD: 2.6 % (ref 0–8)
ERYTHROCYTE [DISTWIDTH] IN BLOOD BY AUTOMATED COUNT: 12.6 % (ref 11.5–14.5)
EST. GFR  (AFRICAN AMERICAN): >60 ML/MIN/1.73 M^2
EST. GFR  (NON AFRICAN AMERICAN): 56 ML/MIN/1.73 M^2
ETHANOL SERPL-MCNC: <10 MG/DL
GLUCOSE SERPL-MCNC: 110 MG/DL (ref 70–110)
HCT VFR BLD AUTO: 39.2 % (ref 40–54)
HGB BLD-MCNC: 13.2 G/DL (ref 14–18)
IMM GRANULOCYTES # BLD AUTO: 0.01 K/UL (ref 0–0.04)
IMM GRANULOCYTES NFR BLD AUTO: 0.2 % (ref 0–0.5)
LYMPHOCYTES # BLD AUTO: 3.5 K/UL (ref 1–4.8)
LYMPHOCYTES NFR BLD: 54.4 % (ref 18–48)
MAGNESIUM SERPL-MCNC: 1.8 MG/DL (ref 1.6–2.6)
MCH RBC QN AUTO: 31.2 PG (ref 27–31)
MCHC RBC AUTO-ENTMCNC: 33.7 G/DL (ref 32–36)
MCV RBC AUTO: 93 FL (ref 82–98)
MONOCYTES # BLD AUTO: 0.7 K/UL (ref 0.3–1)
MONOCYTES NFR BLD: 11.2 % (ref 4–15)
NEUTROPHILS # BLD AUTO: 2 K/UL (ref 1.8–7.7)
NEUTROPHILS NFR BLD: 31 % (ref 38–73)
NRBC BLD-RTO: 0 /100 WBC
PHOSPHATE SERPL-MCNC: 2.1 MG/DL (ref 2.7–4.5)
PLATELET # BLD AUTO: 214 K/UL (ref 150–350)
PMV BLD AUTO: 9.2 FL (ref 9.2–12.9)
POTASSIUM SERPL-SCNC: 3.9 MMOL/L (ref 3.5–5.1)
PROT SERPL-MCNC: 6.8 G/DL (ref 6–8.4)
RBC # BLD AUTO: 4.23 M/UL (ref 4.6–6.2)
SODIUM SERPL-SCNC: 140 MMOL/L (ref 136–145)
TROPONIN I SERPL DL<=0.01 NG/ML-MCNC: <0.006 NG/ML (ref 0–0.03)
TROPONIN I SERPL DL<=0.01 NG/ML-MCNC: <0.006 NG/ML (ref 0–0.03)
TSH SERPL DL<=0.005 MIU/L-ACNC: 1.62 UIU/ML (ref 0.4–4)
WBC # BLD AUTO: 6.43 K/UL (ref 3.9–12.7)

## 2020-08-14 PROCEDURE — 96360 HYDRATION IV INFUSION INIT: CPT

## 2020-08-14 PROCEDURE — 80053 COMPREHEN METABOLIC PANEL: CPT

## 2020-08-14 PROCEDURE — 80320 DRUG SCREEN QUANTALCOHOLS: CPT

## 2020-08-14 PROCEDURE — 82553 CREATINE MB FRACTION: CPT

## 2020-08-14 PROCEDURE — 84443 ASSAY THYROID STIM HORMONE: CPT

## 2020-08-14 PROCEDURE — 99285 EMERGENCY DEPT VISIT HI MDM: CPT | Mod: 25

## 2020-08-14 PROCEDURE — 93005 ELECTROCARDIOGRAM TRACING: CPT

## 2020-08-14 PROCEDURE — 25000003 PHARM REV CODE 250: Performed by: SURGERY

## 2020-08-14 PROCEDURE — 93010 ELECTROCARDIOGRAM REPORT: CPT | Mod: ,,, | Performed by: INTERNAL MEDICINE

## 2020-08-14 PROCEDURE — 84100 ASSAY OF PHOSPHORUS: CPT

## 2020-08-14 PROCEDURE — 85025 COMPLETE CBC W/AUTO DIFF WBC: CPT

## 2020-08-14 PROCEDURE — 84484 ASSAY OF TROPONIN QUANT: CPT | Mod: 91

## 2020-08-14 PROCEDURE — 82550 ASSAY OF CK (CPK): CPT

## 2020-08-14 PROCEDURE — 83735 ASSAY OF MAGNESIUM: CPT

## 2020-08-14 PROCEDURE — 83880 ASSAY OF NATRIURETIC PEPTIDE: CPT

## 2020-08-14 PROCEDURE — 36415 COLL VENOUS BLD VENIPUNCTURE: CPT

## 2020-08-14 PROCEDURE — 96361 HYDRATE IV INFUSION ADD-ON: CPT

## 2020-08-14 PROCEDURE — 93010 EKG 12-LEAD: ICD-10-PCS | Mod: ,,, | Performed by: INTERNAL MEDICINE

## 2020-08-14 RX ORDER — SODIUM CHLORIDE 9 MG/ML
1000 INJECTION, SOLUTION INTRAVENOUS
Status: COMPLETED | OUTPATIENT
Start: 2020-08-14 | End: 2020-08-14

## 2020-08-14 RX ADMIN — SODIUM CHLORIDE 1000 ML: 0.9 INJECTION, SOLUTION INTRAVENOUS at 10:08

## 2020-08-14 RX ADMIN — SODIUM CHLORIDE 1000 ML: 0.9 INJECTION, SOLUTION INTRAVENOUS at 09:08

## 2020-08-14 RX ADMIN — SODIUM CHLORIDE 1000 ML: 0.9 INJECTION, SOLUTION INTRAVENOUS at 08:08

## 2020-08-15 NOTE — ED PROVIDER NOTES
[facility]                                                 Chief Complaint  65 y.o. male with Near Syncope      History of Present Illness  Demarco Means presents to the emergency room with syncope today  Patient was eating dinner at a local restaurant when he felt weak and dizzy  This patient has been working outside all day with probable dehydration also  Patient's review of systems negative for chest pain or shortness of breath  Patient is not orthostatic, this patient states he feels dehydrated this evening    The history is provided by the patient   device was not used during this ER visit  Medical history: Back pain, BPH, disc disease, HLD, HTN, kidney stone, Meniere's disease  Surgeries: Back surgery, colonoscopy, eye surgery, spine surgery  Allergies to butylene glycol, Cipro, Levaquin, sulfacetamide sodium    I have reviewed all of this patient's past medical, surgical, family, and social   histories as well as active allergies and medications documented in the  electronic medical record    Review of Systems and Physical Exam      Review of Systems  -- Constitution - no fever, denies fatigue, no weakness, no chills  -- Eyes - no tearing or redness, no visual disturbance  -- Ear, Nose - no tinnitus or earache, no nasal congestion or discharge  -- Mouth,Throat - no sore throat, no toothache, normal voice, normal swallowing  -- Respiratory - denies cough and congestion, no shortness of breath, no BOLTON  -- Cardiovascular - denies chest pain, no palpitations, denies claudication  -- Gastrointestinal - denies abdominal pain, nausea, vomiting, or diarrhea  -- Genitourinary - no dysuria, denies flank pain, no hematuria, no STD risk  -- Musculoskeletal - denies back pain, negative for trauma or injury  -- Neurological - near syncope with no seizure or weakness  -- Skin - denies pallor, rash, or changes in skin. no hives or welts noted    Vital Signs  His skin  temperature is 97.3 °F (36.3 °C).   His blood pressure is 128/78 and his pulse is 69.   His respiration is 17 and oxygen saturation is 99%.     Physical Exam  -- Nursing note and vitals reviewed  -- Constitutional: Appears well-developed and well-nourished  -- Head: Atraumatic. Normocephalic. No obvious abnormality  -- Eyes: Pupils are equal and reactive to light. Normal conjunctiva and lids  -- Cardiac: Normal rate, regular rhythm and normal heart sounds  -- Pulmonary: Normal respiratory effort, breath sounds clear to auscultation  -- Abdominal: Soft, no tenderness. Normal bowel sounds. Normal liver edge  -- Musculoskeletal: Normal range of motion, no effusions. Joints stable   -- Neurological: No focal deficits. Showed good interaction with staff  -- Vascular: Posterior tibial, dorsalis pedis and radial pulses 2+ bilaterally      Emergency Room Course      Lab Results     K 3.9      CO2 23   BUN 19   CREATININE 1.3      ALKPHOS 73   AST 24   ALT 24   BILITOT 0.2   ALBUMIN 3.7   PROT 6.8   WBC 6.43   HGB 13.2 (L)   HCT 39.2 (L)       (H)   CPKMB 3.7   TROPONINI <0.006   BNP <10   MG 1.8   TSH 1.618     EKG  -- The EKG findings today were without concerning findings from baseline  -- The troponin drawn in the ER today was within normal limits  -- The 2nd troponin drawn in the ER today was within normal limits      Radiology  -- The CT of the head performed in the ER today was negative for acute pathology  -- Chest x-ray showed no infiltrate and showed no acute pathology     Medications Given  0.9%  NaCl infusion (0 mLs Intravenous Stopped 8/14/20 2125)   0.9%  NaCl infusion (0 mLs Intravenous Stopped 8/14/20 2328)   0.9%  NaCl infusion (0 mLs Intravenous Stopped 8/14/20 2328)     ED Physician Management  -- Diagnosis management comments: 65 y.o. male with syncope tonight  -- patient felt like he was going to pass out at dinner, felt dehydrated  -- patient with a negative workup today  including head CT/chest x-ray  -- stable EKG with 2 sets of negative troponins in the ER this evening  -- patient with no signs of distress, normal neurologic exam in the ER  -- patient feels much better after IV fluids administered in the ER now  -- asymptomatic on discharge, will follow up with Cardiology and PCP  -- return to the ER with any concerning symptoms after discharge today    Diagnosis  [R55] Syncope  [E86.0] Dehydration (Primary)    Disposition and Plan  -- Disposition: home  -- Condition: stable  -- Follow-up: Patient to follow up with Marky Mills MD in 1-2 days.  -- I advised the patient that we have found no life threatening condition today  -- At this time, I believe the patient is clinically stable for discharge.   -- The patient acknowledges that close follow up with a MD is required   -- Patient agrees to comply with all instruction and direction given in the ER    This note is dictated on M*Modal word recognition program.  There are word recognition mistakes that are occasionally missed on review.         Eleazar Deluna MD  08/14/20 3003

## 2020-08-15 NOTE — ED TRIAGE NOTES
"65 yr old male to ER per [facility] with c/o"near syncope while drinking Ice tea at [employer] Patient usually works in the sun, but states didn't do much today." Patient arrived with an 18 gauge IV to left AC per [facility] and NS infusing.  "

## 2020-08-15 NOTE — ED NOTES
Patient returned from CT, reconnected to monitor. Call bell in reach, instructed patient to call for needs.

## 2020-08-15 NOTE — ED NOTES
Patient placed on continuous cardiac monitor, automatic blood pressure cuff and continuous pulse oximeter.

## 2020-08-25 ENCOUNTER — LAB VISIT (OUTPATIENT)
Dept: LAB | Facility: HOSPITAL | Age: 65
End: 2020-08-25
Attending: INTERNAL MEDICINE
Payer: MEDICARE

## 2020-08-25 ENCOUNTER — OFFICE VISIT (OUTPATIENT)
Dept: INTERNAL MEDICINE | Facility: CLINIC | Age: 65
End: 2020-08-25
Payer: MEDICARE

## 2020-08-25 VITALS
DIASTOLIC BLOOD PRESSURE: 78 MMHG | HEART RATE: 76 BPM | WEIGHT: 227 LBS | SYSTOLIC BLOOD PRESSURE: 130 MMHG | OXYGEN SATURATION: 98 % | HEIGHT: 70 IN | BODY MASS INDEX: 32.5 KG/M2

## 2020-08-25 DIAGNOSIS — I10 ESSENTIAL HYPERTENSION: ICD-10-CM

## 2020-08-25 DIAGNOSIS — R42 POSTURAL DIZZINESS WITH PRESYNCOPE: Primary | ICD-10-CM

## 2020-08-25 DIAGNOSIS — R00.2 PALPITATIONS: ICD-10-CM

## 2020-08-25 DIAGNOSIS — R55 POSTURAL DIZZINESS WITH PRESYNCOPE: ICD-10-CM

## 2020-08-25 DIAGNOSIS — R42 POSTURAL DIZZINESS WITH PRESYNCOPE: ICD-10-CM

## 2020-08-25 DIAGNOSIS — R00.0 TACHYCARDIA: ICD-10-CM

## 2020-08-25 DIAGNOSIS — R55 POSTURAL DIZZINESS WITH PRESYNCOPE: Primary | ICD-10-CM

## 2020-08-25 LAB
ALBUMIN SERPL BCP-MCNC: 4.1 G/DL (ref 3.5–5.2)
ANION GAP SERPL CALC-SCNC: 7 MMOL/L (ref 8–16)
BASOPHILS # BLD AUTO: 0.04 K/UL (ref 0–0.2)
BASOPHILS NFR BLD: 0.6 % (ref 0–1.9)
BUN SERPL-MCNC: 16 MG/DL (ref 8–23)
CALCIUM SERPL-MCNC: 10.1 MG/DL (ref 8.7–10.5)
CHLORIDE SERPL-SCNC: 102 MMOL/L (ref 95–110)
CO2 SERPL-SCNC: 28 MMOL/L (ref 23–29)
CREAT SERPL-MCNC: 1.4 MG/DL (ref 0.5–1.4)
DIFFERENTIAL METHOD: NORMAL
EOSINOPHIL # BLD AUTO: 0.1 K/UL (ref 0–0.5)
EOSINOPHIL NFR BLD: 1.8 % (ref 0–8)
ERYTHROCYTE [DISTWIDTH] IN BLOOD BY AUTOMATED COUNT: 12.3 % (ref 11.5–14.5)
EST. GFR  (AFRICAN AMERICAN): 59.7 ML/MIN/1.73 M^2
EST. GFR  (NON AFRICAN AMERICAN): 51.6 ML/MIN/1.73 M^2
GLUCOSE SERPL-MCNC: 90 MG/DL (ref 70–110)
HCT VFR BLD AUTO: 45 % (ref 40–54)
HGB BLD-MCNC: 14.6 G/DL (ref 14–18)
IMM GRANULOCYTES # BLD AUTO: 0.02 K/UL (ref 0–0.04)
IMM GRANULOCYTES NFR BLD AUTO: 0.3 % (ref 0–0.5)
LYMPHOCYTES # BLD AUTO: 2.6 K/UL (ref 1–4.8)
LYMPHOCYTES NFR BLD: 39.4 % (ref 18–48)
MAGNESIUM SERPL-MCNC: 1.7 MG/DL (ref 1.6–2.6)
MCH RBC QN AUTO: 30.9 PG (ref 27–31)
MCHC RBC AUTO-ENTMCNC: 32.4 G/DL (ref 32–36)
MCV RBC AUTO: 95 FL (ref 82–98)
MONOCYTES # BLD AUTO: 0.4 K/UL (ref 0.3–1)
MONOCYTES NFR BLD: 6.6 % (ref 4–15)
NEUTROPHILS # BLD AUTO: 3.4 K/UL (ref 1.8–7.7)
NEUTROPHILS NFR BLD: 51.3 % (ref 38–73)
NRBC BLD-RTO: 0 /100 WBC
PHOSPHATE SERPL-MCNC: 3.2 MG/DL (ref 2.7–4.5)
PLATELET # BLD AUTO: 218 K/UL (ref 150–350)
PMV BLD AUTO: 9.8 FL (ref 9.2–12.9)
POTASSIUM SERPL-SCNC: 4.4 MMOL/L (ref 3.5–5.1)
RBC # BLD AUTO: 4.72 M/UL (ref 4.6–6.2)
SODIUM SERPL-SCNC: 137 MMOL/L (ref 136–145)
WBC # BLD AUTO: 6.65 K/UL (ref 3.9–12.7)

## 2020-08-25 PROCEDURE — 1125F PR PAIN SEVERITY QUANTIFIED, PAIN PRESENT: ICD-10-PCS | Mod: S$GLB,,, | Performed by: INTERNAL MEDICINE

## 2020-08-25 PROCEDURE — 1101F PT FALLS ASSESS-DOCD LE1/YR: CPT | Mod: CPTII,S$GLB,, | Performed by: INTERNAL MEDICINE

## 2020-08-25 PROCEDURE — 83735 ASSAY OF MAGNESIUM: CPT

## 2020-08-25 PROCEDURE — 1101F PR PT FALLS ASSESS DOC 0-1 FALLS W/OUT INJ PAST YR: ICD-10-PCS | Mod: CPTII,S$GLB,, | Performed by: INTERNAL MEDICINE

## 2020-08-25 PROCEDURE — 3075F PR MOST RECENT SYSTOLIC BLOOD PRESS GE 130-139MM HG: ICD-10-PCS | Mod: CPTII,S$GLB,, | Performed by: INTERNAL MEDICINE

## 2020-08-25 PROCEDURE — 1159F MED LIST DOCD IN RCRD: CPT | Mod: S$GLB,,, | Performed by: INTERNAL MEDICINE

## 2020-08-25 PROCEDURE — 99214 OFFICE O/P EST MOD 30 MIN: CPT | Mod: S$GLB,,, | Performed by: INTERNAL MEDICINE

## 2020-08-25 PROCEDURE — 99999 PR PBB SHADOW E&M-EST. PATIENT-LVL III: ICD-10-PCS | Mod: PBBFAC,,, | Performed by: INTERNAL MEDICINE

## 2020-08-25 PROCEDURE — 3008F PR BODY MASS INDEX (BMI) DOCUMENTED: ICD-10-PCS | Mod: CPTII,S$GLB,, | Performed by: INTERNAL MEDICINE

## 2020-08-25 PROCEDURE — 3078F PR MOST RECENT DIASTOLIC BLOOD PRESSURE < 80 MM HG: ICD-10-PCS | Mod: CPTII,S$GLB,, | Performed by: INTERNAL MEDICINE

## 2020-08-25 PROCEDURE — 3078F DIAST BP <80 MM HG: CPT | Mod: CPTII,S$GLB,, | Performed by: INTERNAL MEDICINE

## 2020-08-25 PROCEDURE — 85025 COMPLETE CBC W/AUTO DIFF WBC: CPT

## 2020-08-25 PROCEDURE — 99999 PR PBB SHADOW E&M-EST. PATIENT-LVL III: CPT | Mod: PBBFAC,,, | Performed by: INTERNAL MEDICINE

## 2020-08-25 PROCEDURE — 1125F AMNT PAIN NOTED PAIN PRSNT: CPT | Mod: S$GLB,,, | Performed by: INTERNAL MEDICINE

## 2020-08-25 PROCEDURE — 3008F BODY MASS INDEX DOCD: CPT | Mod: CPTII,S$GLB,, | Performed by: INTERNAL MEDICINE

## 2020-08-25 PROCEDURE — 99214 PR OFFICE/OUTPT VISIT, EST, LEVL IV, 30-39 MIN: ICD-10-PCS | Mod: S$GLB,,, | Performed by: INTERNAL MEDICINE

## 2020-08-25 PROCEDURE — 36415 COLL VENOUS BLD VENIPUNCTURE: CPT

## 2020-08-25 PROCEDURE — 3075F SYST BP GE 130 - 139MM HG: CPT | Mod: CPTII,S$GLB,, | Performed by: INTERNAL MEDICINE

## 2020-08-25 PROCEDURE — 80069 RENAL FUNCTION PANEL: CPT

## 2020-08-25 PROCEDURE — 1159F PR MEDICATION LIST DOCUMENTED IN MEDICAL RECORD: ICD-10-PCS | Mod: S$GLB,,, | Performed by: INTERNAL MEDICINE

## 2020-08-25 NOTE — PROGRESS NOTES
PAST MEDICAL HISTORY:  Hypertension.  Hyperlipidemia.  BPH.  Lumbar degenerative disc disease with previous back surgeries.  Meniere's disease.  Gastroesophageal reflux disease.  Nephrolithiasis.  Prostatitis.  Adenomatous colon polyp in 2003.  Chronic kidney disease, stage II-III.     SOCIAL HISTORY:  Tobacco and alcohol use -- none.       MEDICATIONS:  Allopurinol 100 mg daily.  Benazepril 40 mg daily.  Zetia 10 mg daily.  Flonase as needed.       65-year-old male    This morning around 10:30 a.m., and if the he came from a restaurant to eat breakfest, he started having a spell of being dizzy is if he might pass out, associated with heart beating fast and feel a little bit nauseated and having upper abdominal pain, this occur while he is walking to the house but he was able to get into the house in lie down.  He felt the need to have a few bowel movements and afterwards felt better.  There is no shortness of breath or chest pain    He however had a very similar situation on August 14th when he was at a restaurant, he had not eaten yet, then a feeling of dizziness is if he might pass out, heart beating fast, nausea, feel a bit short of breath came upon arm.  ENT is came out and rum to the emergency room.  Evaluation was unrevealing which included electrocardiogram in CT of the head and cardiac enzymes.  He was told of a diagnosis of dehydration was given 3 L of fluids.  But there was no acute changes in blood pressure as well as creatinine.  The phosphorus level was low at 2.1    He will state that at times he might feel palpitations if the heart is beating this may last for couple beats    Medical history is outlined above it should be noted that he is no longer on fenofibrate or gabapentin    Examination  Weight 227  Pulse 76  Blood pressure 144/72  2+ carotid pulses no bruits  Chest clear breath sounds  Heart regular rate rhythm no murmurs  Abdominal exam nontender soft no hepatosplenomegaly abdominal  masses      Impression  Episodic dizziness/pre syncope  Palpitations/tachycardia  Hypertension    Plan  CBC, renal function panel, magnesium  48 hr Holter  Phone review to follow-up    Hypertension

## 2020-08-26 ENCOUNTER — HOSPITAL ENCOUNTER (OUTPATIENT)
Dept: PULMONOLOGY | Facility: HOSPITAL | Age: 65
Discharge: HOME | End: 2020-08-26
Attending: INTERNAL MEDICINE
Payer: MEDICARE

## 2020-08-26 DIAGNOSIS — R00.0 TACHYCARDIA: ICD-10-CM

## 2020-08-26 DIAGNOSIS — R42 POSTURAL DIZZINESS WITH PRESYNCOPE: ICD-10-CM

## 2020-08-26 DIAGNOSIS — R00.2 PALPITATIONS: ICD-10-CM

## 2020-08-26 DIAGNOSIS — R55 POSTURAL DIZZINESS WITH PRESYNCOPE: ICD-10-CM

## 2020-08-26 DIAGNOSIS — I10 ESSENTIAL HYPERTENSION: ICD-10-CM

## 2020-08-26 PROCEDURE — 93225 XTRNL ECG REC<48 HRS REC: CPT

## 2020-08-26 PROCEDURE — 93227 HOLTER MONITOR - 48 HOUR (CUPID ONLY): ICD-10-PCS | Mod: ,,, | Performed by: INTERNAL MEDICINE

## 2020-08-26 PROCEDURE — 93227 XTRNL ECG REC<48 HR R&I: CPT | Mod: ,,, | Performed by: INTERNAL MEDICINE

## 2020-08-26 NOTE — PROGRESS NOTES
Labs noted    Recommend magnesium supplementation , which he used to be on but stop for while as well as the use of probiotic    Will follow-up on the Holter next week

## 2020-08-28 LAB
OHS CV EVENT MONITOR DAY: 0
OHS CV HOLTER LENGTH DECIMAL HOURS: 48
OHS CV HOLTER LENGTH HOURS: 48
OHS CV HOLTER LENGTH MINUTES: 0

## 2020-08-29 ENCOUNTER — DOCUMENTATION ONLY (OUTPATIENT)
Dept: INTERNAL MEDICINE | Facility: CLINIC | Age: 65
End: 2020-08-29

## 2020-08-29 ENCOUNTER — TELEPHONE (OUTPATIENT)
Dept: INTERNAL MEDICINE | Facility: CLINIC | Age: 65
End: 2020-08-29

## 2020-08-29 DIAGNOSIS — I25.84 CORONARY ARTERY CALCIFICATION: Primary | ICD-10-CM

## 2020-08-29 DIAGNOSIS — R55 POSTURAL DIZZINESS WITH PRESYNCOPE: ICD-10-CM

## 2020-08-29 DIAGNOSIS — R42 POSTURAL DIZZINESS WITH PRESYNCOPE: ICD-10-CM

## 2020-08-29 DIAGNOSIS — E78.5 HYPERLIPIDEMIA, UNSPECIFIED HYPERLIPIDEMIA TYPE: ICD-10-CM

## 2020-08-29 DIAGNOSIS — I25.10 CORONARY ARTERY CALCIFICATION: Primary | ICD-10-CM

## 2020-08-29 NOTE — PROGRESS NOTES
The 48 hr Holter did not reveal any significant arrhythmias, there was a rare PAC and PVC and a 2 run of nonsustained atrial tachycardia    He is known to have coronary artery calcification based on the CT scan 2016 and  given the recent dizzy events would recommend follow-up on stress 2D echo      In the past he was tried on the medications pravastatin rosuvastatin but stopped because a muscle and joint aches and then he was on fenofibrate for while but eventually stop because he did not feel well with this although at present he can terminate exact symptoms but remembers feeling better when he got off    Repeat fasting lipid profile and if abnormal this needs to be readdressed

## 2020-09-01 ENCOUNTER — HOSPITAL ENCOUNTER (OUTPATIENT)
Dept: CARDIOLOGY | Facility: HOSPITAL | Age: 65
Discharge: HOME | End: 2020-09-01
Attending: INTERNAL MEDICINE
Payer: MEDICARE

## 2020-09-01 ENCOUNTER — TELEPHONE (OUTPATIENT)
Dept: INTERNAL MEDICINE | Facility: CLINIC | Age: 65
End: 2020-09-01

## 2020-09-01 ENCOUNTER — DOCUMENTATION ONLY (OUTPATIENT)
Dept: INTERNAL MEDICINE | Facility: CLINIC | Age: 65
End: 2020-09-01

## 2020-09-01 VITALS — HEIGHT: 70 IN | WEIGHT: 225 LBS | BODY MASS INDEX: 32.21 KG/M2

## 2020-09-01 DIAGNOSIS — I25.10 CORONARY ARTERY CALCIFICATION: ICD-10-CM

## 2020-09-01 DIAGNOSIS — I25.84 CORONARY ARTERY CALCIFICATION: ICD-10-CM

## 2020-09-01 DIAGNOSIS — R55 POSTURAL DIZZINESS WITH PRESYNCOPE: ICD-10-CM

## 2020-09-01 DIAGNOSIS — E78.5 HYPERLIPIDEMIA, UNSPECIFIED HYPERLIPIDEMIA TYPE: Primary | ICD-10-CM

## 2020-09-01 DIAGNOSIS — R42 POSTURAL DIZZINESS WITH PRESYNCOPE: ICD-10-CM

## 2020-09-01 LAB
ASCENDING AORTA: 3.13 CM
BSA FOR ECHO PROCEDURE: 2.24 M2
CV ECHO LV RWT: 0.41 CM
CV STRESS BASE HR: 73 BPM
DIASTOLIC BLOOD PRESSURE: 79 MMHG
DOP CALC LVOT AREA: 3.9 CM2
DOP CALC LVOT DIAMETER: 2.23 CM
DOP CALC LVOT PEAK VEL: 1.03 M/S
DOP CALC LVOT STROKE VOLUME: 86.12 CM3
DOP CALCLVOT PEAK VEL VTI: 22.06 CM
E WAVE DECELERATION TIME: 162.49 MSEC
E/A RATIO: 1.08
E/E' RATIO: 8.11 M/S
ECHO LV POSTERIOR WALL: 1.02 CM (ref 0.6–1.1)
FRACTIONAL SHORTENING: 36 % (ref 28–44)
INTERVENTRICULAR SEPTUM: 1.07 CM (ref 0.6–1.1)
IVRT: 91.34 MSEC
LA MAJOR: 5.96 CM
LA MINOR: 5.37 CM
LA WIDTH: 4.27 CM
LEFT ATRIUM SIZE: 4.38 CM
LEFT ATRIUM VOLUME INDEX: 40.9 ML/M2
LEFT ATRIUM VOLUME: 89.81 CM3
LEFT INTERNAL DIMENSION IN SYSTOLE: 3.19 CM (ref 2.1–4)
LEFT VENTRICLE DIASTOLIC VOLUME INDEX: 54.33 ML/M2
LEFT VENTRICLE DIASTOLIC VOLUME: 119.22 ML
LEFT VENTRICLE MASS INDEX: 89 G/M2
LEFT VENTRICLE SYSTOLIC VOLUME INDEX: 18.6 ML/M2
LEFT VENTRICLE SYSTOLIC VOLUME: 40.73 ML
LEFT VENTRICULAR INTERNAL DIMENSION IN DIASTOLE: 5.02 CM (ref 3.5–6)
LEFT VENTRICULAR MASS: 194.39 G
LV LATERAL E/E' RATIO: 7 M/S
LV SEPTAL E/E' RATIO: 9.63 M/S
MV PEAK A VEL: 0.71 M/S
MV PEAK E VEL: 0.77 M/S
MV STENOSIS PRESSURE HALF TIME: 47.12 MS
MV VALVE AREA P 1/2 METHOD: 4.67 CM2
OHS CV CPX 1 MINUTE RECOVERY HEART RATE: 121 BPM
OHS CV CPX 85 PERCENT MAX PREDICTED HEART RATE MALE: 130
OHS CV CPX ESTIMATED METS: 8
OHS CV CPX MAX PREDICTED HEART RATE: 153
OHS CV CPX PATIENT IS FEMALE: 0
OHS CV CPX PATIENT IS MALE: 1
OHS CV CPX PEAK DIASTOLIC BLOOD PRESSURE: 91 MMHG
OHS CV CPX PEAK HEAR RATE: 151 BPM
OHS CV CPX PEAK RATE PRESSURE PRODUCT: NORMAL
OHS CV CPX PEAK SYSTOLIC BLOOD PRESSURE: 202 MMHG
OHS CV CPX PERCENT MAX PREDICTED HEART RATE ACHIEVED: 99
OHS CV CPX RATE PRESSURE PRODUCT PRESENTING: 9563
PISA TR MAX VEL: 2.47 M/S
PULM VEIN S/D RATIO: 1.56
PV PEAK D VEL: 0.39 M/S
PV PEAK S VEL: 0.61 M/S
RA MAJOR: 5.22 CM
RA PRESSURE: 3 MMHG
RA WIDTH: 4.43 CM
RIGHT VENTRICULAR END-DIASTOLIC DIMENSION: 4.33 CM
RV TISSUE DOPPLER FREE WALL SYSTOLIC VELOCITY 1 (APICAL 4 CHAMBER VIEW): 13.38 CM/S
SINUS: 3.53 CM
STJ: 2.78 CM
STRESS ECHO POST EXERCISE DUR MIN: 5 MINUTES
STRESS ECHO POST EXERCISE DUR SEC: 29 SECONDS
SYSTOLIC BLOOD PRESSURE: 131 MMHG
TDI LATERAL: 0.11 M/S
TDI SEPTAL: 0.08 M/S
TDI: 0.1 M/S
TR MAX PG: 24 MMHG
TRICUSPID ANNULAR PLANE SYSTOLIC EXCURSION: 2.43 CM
TV REST PULMONARY ARTERY PRESSURE: 27 MMHG

## 2020-09-01 PROCEDURE — 93351 STRESS TTE COMPLETE: CPT | Mod: 26,,, | Performed by: INTERNAL MEDICINE

## 2020-09-01 PROCEDURE — 93352 ADMIN ECG CONTRAST AGENT: CPT | Mod: ,,, | Performed by: INTERNAL MEDICINE

## 2020-09-01 PROCEDURE — 63600175 PHARM REV CODE 636 W HCPCS: Performed by: INTERNAL MEDICINE

## 2020-09-01 PROCEDURE — 93352 STRESS ECHO (CUPID ONLY): ICD-10-PCS | Mod: ,,, | Performed by: INTERNAL MEDICINE

## 2020-09-01 PROCEDURE — 93351 STRESS ECHO (CUPID ONLY): ICD-10-PCS | Mod: 26,,, | Performed by: INTERNAL MEDICINE

## 2020-09-01 PROCEDURE — 93351 STRESS TTE COMPLETE: CPT

## 2020-09-01 RX ORDER — ROSUVASTATIN CALCIUM 5 MG/1
5 TABLET, COATED ORAL DAILY
Qty: 30 TABLET | Refills: 1 | Status: SHIPPED | OUTPATIENT
Start: 2020-09-01 | End: 2020-12-22

## 2020-09-01 RX ADMIN — HUMAN ALBUMIN MICROSPHERES AND PERFLUTREN 0.66 MG: 10; .22 INJECTION, SOLUTION INTRAVENOUS at 11:09

## 2020-09-02 NOTE — PROGRESS NOTES
Stress 2D echo was negative for ischemia, normal cardiac function    Total cholesterol 190, triglycerides 243    Results discussed and will make attempt to restart statin drug with Crestor 5 mg, since he is known to have coronary artery calcification    If he is able to tolerate can increase up to 10 mg    Arrange for repeat labs in a few months

## 2020-10-19 ENCOUNTER — HOSPITAL ENCOUNTER (EMERGENCY)
Facility: HOSPITAL | Age: 65
Discharge: HOME | End: 2020-10-20
Attending: EMERGENCY MEDICINE
Payer: MEDICARE

## 2020-10-19 DIAGNOSIS — T78.40XA ALLERGIC REACTION TO DRUG, INITIAL ENCOUNTER: ICD-10-CM

## 2020-10-19 DIAGNOSIS — L23.7 POISON IVY: ICD-10-CM

## 2020-10-19 DIAGNOSIS — L25.9 CONTACT DERMATITIS, UNSPECIFIED CONTACT DERMATITIS TYPE, UNSPECIFIED TRIGGER: Primary | ICD-10-CM

## 2020-10-19 PROCEDURE — 99284 EMERGENCY DEPT VISIT MOD MDM: CPT | Mod: 25

## 2020-10-19 PROCEDURE — 25000003 PHARM REV CODE 250: Performed by: EMERGENCY MEDICINE

## 2020-10-19 PROCEDURE — 96361 HYDRATE IV INFUSION ADD-ON: CPT

## 2020-10-19 PROCEDURE — 96372 THER/PROPH/DIAG INJ SC/IM: CPT

## 2020-10-19 PROCEDURE — 96374 THER/PROPH/DIAG INJ IV PUSH: CPT

## 2020-10-19 PROCEDURE — 63600175 PHARM REV CODE 636 W HCPCS: Performed by: EMERGENCY MEDICINE

## 2020-10-19 RX ORDER — DIPHENHYDRAMINE HYDROCHLORIDE 50 MG/ML
50 INJECTION INTRAMUSCULAR; INTRAVENOUS
Status: COMPLETED | OUTPATIENT
Start: 2020-10-19 | End: 2020-10-19

## 2020-10-19 RX ORDER — METHYLPREDNISOLONE SOD SUCC 125 MG
125 VIAL (EA) INJECTION
Status: COMPLETED | OUTPATIENT
Start: 2020-10-19 | End: 2020-10-19

## 2020-10-19 RX ORDER — PREDNISONE 10 MG/1
10 TABLET ORAL DAILY
Qty: 21 TABLET | Refills: 0 | Status: SHIPPED | OUTPATIENT
Start: 2020-10-19 | End: 2020-12-07

## 2020-10-19 RX ORDER — SODIUM CHLORIDE 9 MG/ML
1000 INJECTION, SOLUTION INTRAVENOUS
Status: COMPLETED | OUTPATIENT
Start: 2020-10-19 | End: 2020-10-20

## 2020-10-19 RX ADMIN — SODIUM CHLORIDE 1000 ML: 0.9 INJECTION, SOLUTION INTRAVENOUS at 11:10

## 2020-10-19 RX ADMIN — DIPHENHYDRAMINE HYDROCHLORIDE 50 MG: 50 INJECTION INTRAMUSCULAR; INTRAVENOUS at 11:10

## 2020-10-19 RX ADMIN — METHYLPREDNISOLONE SODIUM SUCCINATE 125 MG: 125 INJECTION, POWDER, FOR SOLUTION INTRAMUSCULAR; INTRAVENOUS at 10:10

## 2020-10-20 VITALS
OXYGEN SATURATION: 98 % | SYSTOLIC BLOOD PRESSURE: 136 MMHG | WEIGHT: 217.13 LBS | HEIGHT: 70 IN | TEMPERATURE: 98 F | DIASTOLIC BLOOD PRESSURE: 84 MMHG | BODY MASS INDEX: 31.09 KG/M2 | HEART RATE: 61 BPM | RESPIRATION RATE: 17 BRPM

## 2020-10-20 NOTE — ED TRIAGE NOTES
C/O generalized rash to legs and L arm with intermittent itching onset 3 days PTA. 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 Denies SOB/ difficulty breathing or swallowing

## 2020-10-20 NOTE — ED PROVIDER NOTES
Encounter Date: 10/19/2020       History     Chief Complaint   Patient presents with    Rash     BLE/ L arm; denies SOB; +itching     0001:    Patient seen earlier and given solumedrol.   States possible allergic reaction to the solumedrol.     The history is provided by the patient.   Rash   This is a new problem. The current episode started yesterday. The problem has been unchanged. The problem is associated with plant contact. The rash is present on the left lower leg. The pain is at a severity of 0/10. The pain has been constant since onset. Associated symptoms include itching. He has tried nothing for the symptoms.     Review of patient's allergies indicates:   Allergen Reactions    1,3-butylene glycol Shortness Of Breath    Ciprofloxacin Nausea Only and Other (See Comments)    Levofloxacin Other (See Comments)    Sulfacetamide sodium Other (See Comments)     Past Medical History:   Diagnosis Date    Agatston CAC score, >400 4/11/2016    813     Agatston CAC score, >400     Allergy     Back pain     BPH (benign prostatic hypertrophy)     Degenerative disc disease     GERD (gastroesophageal reflux disease)     Hyperlipidemia     Hypertension     Kidney stones     Meniere disease      Past Surgical History:   Procedure Laterality Date    BACK SURGERY      COLONOSCOPY N/A 4/7/2016    Procedure: COLONOSCOPY;  Surgeon: Esau Helms MD;  Location: [address]);  Service: Endoscopy;  Laterality: N/A;  Last procedure by Scooter 4/6/2011    EYE SURGERY Bilateral     bilateral    SPINE SURGERY  2007    3 lumbar surgeries     Family History   Problem Relation Age of Onset    Hypertension Father     Parkinsonism Father     Heart disease Father     Heart disease Mother     Kidney disease Mother     Glaucoma Mother     Heart disease Sister     Anuerysm Brother     Cancer Maternal Uncle         pancreatic    No Known Problems Sister     No Known Problems Sister     No Known  Problems Son     Melanoma Neg Hx      Social History     Tobacco Use    Smoking status: Never Smoker    Smokeless tobacco: Never Used   Substance Use Topics    Alcohol use: No     Alcohol/week: 0.0 standard drinks    Drug use: No     Review of Systems   Constitutional: Negative for fever.   HENT: Negative for ear discharge and ear pain.    Eyes: Negative for pain and redness.   Respiratory: Negative for cough and shortness of breath.    Cardiovascular: Negative for chest pain.   Gastrointestinal: Negative for abdominal pain, constipation, diarrhea, nausea and vomiting.   Genitourinary: Negative for enuresis and flank pain.   Musculoskeletal: Negative for back pain, gait problem, neck pain and neck stiffness.   Skin: Positive for itching and rash.   Neurological: Negative for headaches.       Physical Exam     Initial Vitals [10/19/20 2215]   BP Pulse Resp Temp SpO2   (!) 136/90 86 19 97.9 °F (36.6 °C) 98 %      MAP       --         Physical Exam    Nursing note and vitals reviewed.  Constitutional: He appears well-developed and well-nourished. He is not diaphoretic. No distress.   HENT:   Head: Normocephalic and atraumatic.   Mouth/Throat: No oropharyngeal exudate.   Eyes: EOM are normal. Pupils are equal, round, and reactive to light.   Neck: Normal range of motion. Neck supple. No JVD present.   Pulmonary/Chest: Breath sounds normal. No stridor. No respiratory distress. He has no wheezes. He has no rhonchi. He has no rales.   Abdominal: Soft. Bowel sounds are normal. He exhibits no distension. There is no abdominal tenderness. There is no rebound and no guarding.   Musculoskeletal: Normal range of motion. No tenderness or edema.   Neurological: He is alert and oriented to person, place, and time. No sensory deficit.   Skin: Rash noted.              ED Course   Procedures  Labs Reviewed - No data to display       Imaging Results          CT Head Without Contrast (Final result)  Result time 10/19/20 23:41:31     Final result by Viraj Pedraza MD (10/19/20 23:41:31)                 Impression:      No acute intracranial CT abnormality.    Chronic sinusitis of the right maxillary sinus.    All CT scans at this facility are performed  using dose modulation techniques as appropriate to performed exam including the following:  automated exposure control; adjustment of mA and/or kV according to the patients size (this includes techniques or standardized protocols for targeted exams where dose is matched to indication/reason for exam: i.e. extremities or head);  iterative reconstruction technique.      Electronically signed by: Viraj Pedraza  Date:    10/19/2020  Time:    23:41             Narrative:    EXAMINATION:  CT HEAD WITHOUT CONTRAST    CLINICAL HISTORY:  Dizziness, non-specific;    TECHNIQUE:  Low dose axial CT images obtained throughout the head without intravenous contrast. Sagittal and coronal reconstructions were performed.    COMPARISON:  08/14/2020.    FINDINGS:  Intracranial compartment:    Ventricles and sulci are normal in size for age without evidence of hydrocephalus. No extra-axial blood or fluid collections.    The brain parenchyma appears normal. No parenchymal mass, hemorrhage, edema or major vascular distribution infarct.    Skull/extracranial contents (limited evaluation): No fracture. Mastoid air cells are clear.  Chronic sinusitis of the right maxillary sinus.                                                             Clinical Impression:     ICD-10-CM ICD-9-CM   1. Contact dermatitis, unspecified contact dermatitis type, unspecified trigger  L25.9 692.9   2. Poison ivy  L23.7 692.6   3. Allergic reaction to drug, initial encounter  T78.40XA 995.27                      Disposition:   Disposition: Discharged  Condition: Stable     ED Disposition Condition    Discharge Stable        ED Prescriptions     Medication Sig Dispense Start Date End Date Auth. Provider    predniSONE (DELTASONE) 10 MG tablet  Take 1 tablet (10 mg total) by mouth once daily. Take 4 tabs x 3 days, then  Take 2 tabs x 3 days, then   Take 1 tab x 3 days. 21 tablet 10/19/2020  Devang Bright MD        Follow-up Information     Follow up With Specialties Details Why Contact Info    Marky Mills MD Internal Medicine Schedule an appointment as soon as possible for a visit   [address]  [address]  [place] [id number]  [phone]      Marky Mills MD Internal Medicine Schedule an appointment as soon as possible for a visit  May take benadryl for management [address]  [address], [address]  [facility] [address]  [phone]                                         Devang Bright MD  10/19/20 2228       Devang Bright MD  10/20/20 0143

## 2020-10-26 ENCOUNTER — PES CALL (OUTPATIENT)
Dept: ADMINISTRATIVE | Facility: CLINIC | Age: 65
End: 2020-10-26

## 2020-10-27 ENCOUNTER — PATIENT OUTREACH (OUTPATIENT)
Dept: ADMINISTRATIVE | Facility: OTHER | Age: 65
End: 2020-10-27

## 2020-10-27 NOTE — PROGRESS NOTES
Updates were requested from care everywhere.  Chart was reviewed for overdue Proactive [facility] Encounters (CAREN) topics (CRS, Breast Cancer Screening, Eye exam)  Health Maintenance has been updated.  LINKS not responding.

## 2020-11-29 ENCOUNTER — HOSPITAL ENCOUNTER (EMERGENCY)
Facility: HOSPITAL | Age: 65
Discharge: HOME | End: 2020-11-29
Attending: EMERGENCY MEDICINE
Payer: MEDICARE

## 2020-11-29 VITALS
DIASTOLIC BLOOD PRESSURE: 80 MMHG | RESPIRATION RATE: 15 BRPM | OXYGEN SATURATION: 99 % | BODY MASS INDEX: 31.49 KG/M2 | TEMPERATURE: 97 F | SYSTOLIC BLOOD PRESSURE: 130 MMHG | WEIGHT: 219.44 LBS | HEART RATE: 69 BPM

## 2020-11-29 DIAGNOSIS — K21.9 GASTROESOPHAGEAL REFLUX DISEASE, UNSPECIFIED WHETHER ESOPHAGITIS PRESENT: Primary | ICD-10-CM

## 2020-11-29 LAB
ALBUMIN SERPL BCP-MCNC: 4.1 G/DL (ref 3.5–5.2)
ALP SERPL-CCNC: 62 U/L (ref 55–135)
ALT SERPL W/O P-5'-P-CCNC: 27 U/L (ref 10–44)
ANION GAP SERPL CALC-SCNC: 10 MMOL/L (ref 8–16)
APTT BLDCRRT: 27.9 SEC (ref 21–32)
AST SERPL-CCNC: 21 U/L (ref 10–40)
BASOPHILS # BLD AUTO: 0.03 K/UL (ref 0–0.2)
BASOPHILS NFR BLD: 0.5 % (ref 0–1.9)
BILIRUB SERPL-MCNC: 0.5 MG/DL (ref 0.1–1)
BNP SERPL-MCNC: <10 PG/ML (ref 0–99)
BUN SERPL-MCNC: 18 MG/DL (ref 8–23)
CALCIUM SERPL-MCNC: 9.3 MG/DL (ref 8.7–10.5)
CHLORIDE SERPL-SCNC: 102 MMOL/L (ref 95–110)
CK MB SERPL-MCNC: 1.7 NG/ML (ref 0.1–6.5)
CK MB SERPL-MCNC: 2 NG/ML (ref 0.1–6.5)
CK MB SERPL-RTO: 2.4 % (ref 0–5)
CK MB SERPL-RTO: 2.5 % (ref 0–5)
CK SERPL-CCNC: 70 U/L (ref 20–200)
CK SERPL-CCNC: 70 U/L (ref 20–200)
CK SERPL-CCNC: 79 U/L (ref 20–200)
CO2 SERPL-SCNC: 25 MMOL/L (ref 23–29)
CREAT SERPL-MCNC: 1.3 MG/DL (ref 0.5–1.4)
D DIMER PPP IA.FEU-MCNC: 0.31 MG/L FEU
DIFFERENTIAL METHOD: ABNORMAL
EOSINOPHIL # BLD AUTO: 0.2 K/UL (ref 0–0.5)
EOSINOPHIL NFR BLD: 2.8 % (ref 0–8)
ERYTHROCYTE [DISTWIDTH] IN BLOOD BY AUTOMATED COUNT: 12.2 % (ref 11.5–14.5)
EST. GFR  (AFRICAN AMERICAN): >60 ML/MIN/1.73 M^2
EST. GFR  (NON AFRICAN AMERICAN): 56 ML/MIN/1.73 M^2
GLUCOSE SERPL-MCNC: 101 MG/DL (ref 70–110)
HCT VFR BLD AUTO: 42.7 % (ref 40–54)
HGB BLD-MCNC: 14.7 G/DL (ref 14–18)
IMM GRANULOCYTES # BLD AUTO: 0.02 K/UL (ref 0–0.04)
IMM GRANULOCYTES NFR BLD AUTO: 0.3 % (ref 0–0.5)
INR PPP: 1 (ref 0.8–1.2)
LYMPHOCYTES # BLD AUTO: 3.3 K/UL (ref 1–4.8)
LYMPHOCYTES NFR BLD: 51.5 % (ref 18–48)
MCH RBC QN AUTO: 31.1 PG (ref 27–31)
MCHC RBC AUTO-ENTMCNC: 34.4 G/DL (ref 32–36)
MCV RBC AUTO: 90 FL (ref 82–98)
MONOCYTES # BLD AUTO: 0.5 K/UL (ref 0.3–1)
MONOCYTES NFR BLD: 8 % (ref 4–15)
NEUTROPHILS # BLD AUTO: 2.4 K/UL (ref 1.8–7.7)
NEUTROPHILS NFR BLD: 36.9 % (ref 38–73)
NRBC BLD-RTO: 0 /100 WBC
PLATELET # BLD AUTO: 189 K/UL (ref 150–350)
PMV BLD AUTO: 8.9 FL (ref 9.2–12.9)
POTASSIUM SERPL-SCNC: 3.9 MMOL/L (ref 3.5–5.1)
PROT SERPL-MCNC: 7.2 G/DL (ref 6–8.4)
PROTHROMBIN TIME: 10.3 SEC (ref 9–12.5)
RBC # BLD AUTO: 4.73 M/UL (ref 4.6–6.2)
SODIUM SERPL-SCNC: 137 MMOL/L (ref 136–145)
TROPONIN I SERPL DL<=0.01 NG/ML-MCNC: <0.006 NG/ML (ref 0–0.03)
TROPONIN I SERPL DL<=0.01 NG/ML-MCNC: <0.006 NG/ML (ref 0–0.03)
WBC # BLD AUTO: 6.37 K/UL (ref 3.9–12.7)

## 2020-11-29 PROCEDURE — 25000003 PHARM REV CODE 250: Performed by: EMERGENCY MEDICINE

## 2020-11-29 PROCEDURE — 82550 ASSAY OF CK (CPK): CPT | Mod: 91

## 2020-11-29 PROCEDURE — 82553 CREATINE MB FRACTION: CPT

## 2020-11-29 PROCEDURE — 99285 EMERGENCY DEPT VISIT HI MDM: CPT | Mod: 25

## 2020-11-29 PROCEDURE — 85379 FIBRIN DEGRADATION QUANT: CPT

## 2020-11-29 PROCEDURE — 93010 ELECTROCARDIOGRAM REPORT: CPT | Mod: ,,, | Performed by: INTERNAL MEDICINE

## 2020-11-29 PROCEDURE — 82550 ASSAY OF CK (CPK): CPT

## 2020-11-29 PROCEDURE — 82553 CREATINE MB FRACTION: CPT | Mod: 91

## 2020-11-29 PROCEDURE — 85025 COMPLETE CBC W/AUTO DIFF WBC: CPT

## 2020-11-29 PROCEDURE — 84484 ASSAY OF TROPONIN QUANT: CPT | Mod: 91

## 2020-11-29 PROCEDURE — 93005 ELECTROCARDIOGRAM TRACING: CPT

## 2020-11-29 PROCEDURE — 85610 PROTHROMBIN TIME: CPT

## 2020-11-29 PROCEDURE — 83880 ASSAY OF NATRIURETIC PEPTIDE: CPT

## 2020-11-29 PROCEDURE — 80053 COMPREHEN METABOLIC PANEL: CPT

## 2020-11-29 PROCEDURE — 84484 ASSAY OF TROPONIN QUANT: CPT

## 2020-11-29 PROCEDURE — 93010 EKG 12-LEAD: ICD-10-PCS | Mod: ,,, | Performed by: INTERNAL MEDICINE

## 2020-11-29 PROCEDURE — 85730 THROMBOPLASTIN TIME PARTIAL: CPT

## 2020-11-29 RX ORDER — PANTOPRAZOLE SODIUM 40 MG/1
40 TABLET, DELAYED RELEASE ORAL DAILY
Qty: 30 TABLET | Refills: 0 | Status: SHIPPED | OUTPATIENT
Start: 2020-11-29 | End: 2021-01-21

## 2020-11-29 RX ORDER — SUCRALFATE 1 G/1
1 TABLET ORAL 4 TIMES DAILY
Qty: 120 TABLET | Refills: 0 | Status: SHIPPED | OUTPATIENT
Start: 2020-11-29 | End: 2020-11-29 | Stop reason: SDUPTHER

## 2020-11-29 RX ORDER — ASPIRIN 325 MG
325 TABLET ORAL
Status: COMPLETED | OUTPATIENT
Start: 2020-11-29 | End: 2020-11-29

## 2020-11-29 RX ORDER — PANTOPRAZOLE SODIUM 40 MG/1
40 TABLET, DELAYED RELEASE ORAL DAILY
Qty: 30 TABLET | Refills: 0 | Status: SHIPPED | OUTPATIENT
Start: 2020-11-29 | End: 2020-11-29 | Stop reason: SDUPTHER

## 2020-11-29 RX ORDER — SUCRALFATE 1 G/1
1 TABLET ORAL 4 TIMES DAILY
Qty: 120 TABLET | Refills: 0 | Status: SHIPPED | OUTPATIENT
Start: 2020-11-29 | End: 2020-12-29

## 2020-11-29 RX ADMIN — ASPIRIN 325 MG ORAL TABLET 325 MG: 325 PILL ORAL at 11:11

## 2020-11-29 NOTE — ED PROVIDER NOTES
Encounter Date: 11/29/2020       History     Chief Complaint   Patient presents with    Chest Pain     Patient had 2 episodes of mid sternal chest pain this morning.  The first one was at 9:45 am.  He was in [employer] when he had a second one.  No history of this type of discomfort in the past.  He was short of breath with the second episode.  He has a history of hypertension.         Review of patient's allergies indicates:   Allergen Reactions    1,3-butylene glycol Shortness Of Breath    Methylprednisolone sod phos Swelling    Ciprofloxacin Nausea Only and Other (See Comments)    Levofloxacin Other (See Comments)    Sulfacetamide sodium Other (See Comments)     Past Medical History:   Diagnosis Date    Agatston CAC score, >400 4/11/2016    813     Agatston CAC score, >400     Allergy     Back pain     BPH (benign prostatic hypertrophy)     Degenerative disc disease     GERD (gastroesophageal reflux disease)     Hyperlipidemia     Hypertension     Kidney stones     Meniere disease      Past Surgical History:   Procedure Laterality Date    BACK SURGERY      COLONOSCOPY N/A 4/7/2016    Procedure: COLONOSCOPY;  Surgeon: Esau Helms MD;  Location: [address]);  Service: Endoscopy;  Laterality: N/A;  Last procedure by Scooter 4/6/2011    EYE SURGERY Bilateral     bilateral    SPINE SURGERY  2007    3 lumbar surgeries     Family History   Problem Relation Age of Onset    Hypertension Father     Parkinsonism Father     Heart disease Father     Heart disease Mother     Kidney disease Mother     Glaucoma Mother     Heart disease Sister     Anuerysm Brother     Cancer Maternal Uncle         pancreatic    No Known Problems Sister     No Known Problems Sister     No Known Problems Son     Melanoma Neg Hx      Social History     Tobacco Use    Smoking status: Never Smoker    Smokeless tobacco: Never Used   Substance Use Topics    Alcohol use: No     Alcohol/week: 0.0 standard  drinks    Drug use: No     Review of Systems   Constitutional: Negative for chills and fever.   HENT: Negative for congestion, ear pain, rhinorrhea and sore throat.    Eyes: Negative.    Respiratory: Positive for shortness of breath. Negative for cough and chest tightness.    Cardiovascular: Positive for chest pain. Negative for leg swelling.   Gastrointestinal: Negative for abdominal pain, diarrhea, nausea and vomiting.   Musculoskeletal: Negative for back pain and neck pain.   Skin: Negative for rash.   Neurological: Negative for headaches.       Physical Exam     Initial Vitals   BP Pulse Resp Temp SpO2   11/29/20 1047 11/29/20 1044 11/29/20 1044 11/29/20 1044 11/29/20 1044   (!) 162/84 92 18 96.9 °F (36.1 °C) 100 %      MAP       --                Physical Exam    Nursing note and vitals reviewed.  Constitutional: He appears well-developed and well-nourished. He is not diaphoretic. No distress.   HENT:   Right Ear: External ear normal.   Left Ear: External ear normal.   Nose: Nose normal.   Mouth/Throat: Oropharynx is clear and moist. No oropharyngeal exudate.   Eyes: Conjunctivae and EOM are normal. Right eye exhibits no discharge. Left eye exhibits no discharge.   Neck: Normal range of motion.   Cardiovascular: Normal rate, regular rhythm, normal heart sounds and intact distal pulses.   No murmur heard.  Pulmonary/Chest: Breath sounds normal. No respiratory distress. He has no wheezes.   Abdominal: Soft. Bowel sounds are normal. He exhibits no distension. There is no abdominal tenderness.   Neurological: He is alert and oriented to person, place, and time. GCS score is 15. GCS eye subscore is 4. GCS verbal subscore is 5. GCS motor subscore is 6.   Skin: Skin is warm and dry. No rash noted.         ED Course   Procedures  Labs Reviewed   CBC W/ AUTO DIFFERENTIAL - Abnormal; Notable for the following components:       Result Value    MCH 31.1 (*)     MPV 8.9 (*)     Gran % 36.9 (*)     Lymph % 51.5 (*)     All  other components within normal limits   COMPREHENSIVE METABOLIC PANEL - Abnormal; Notable for the following components:    eGFR if non  56 (*)     All other components within normal limits   TROPONIN I   B-TYPE NATRIURETIC PEPTIDE   APTT   CK-MB   CK   D DIMER, QUANTITATIVE   PROTIME-INR   D DIMER, QUANTITATIVE   APTT   CK-MB   PROTIME-INR   TROPONIN I   CK   CK-MB     EKG Readings: (Independently Interpreted)   Initial Reading: No STEMI. Rhythm: Normal Sinus Rhythm. Heart Rate: 92. Ectopy: No Ectopy. Axis: Left Axis Deviation. Other Impression: no acute st/t segment changes       Imaging Results          X-Ray Chest AP Portable (Final result)  Result time 11/29/20 12:12:47    Final result by Jose C Franklin MD (11/29/20 12:12:47)                 Impression:      Negative chest.  No significant change.      Electronically signed by: Jose C Franklin MD  Date:    11/29/2020  Time:    12:12             Narrative:    EXAMINATION:  XR CHEST AP PORTABLE    CLINICAL HISTORY:  chest pain;    TECHNIQUE:  Single frontal view of the chest was performed.    COMPARISON:  08/14/2020    FINDINGS:  The cardiomediastinal silhouette is within normal limits.  The lungs are well expanded without consolidation or pleural effusion.                                 Medical Decision Making:   History:   Old Medical Records: I decided to obtain old medical records.  Old Records Summarized: records from clinic visits.       <> Summary of Records: Patient has a history of cardiac artery sclerosis                              Clinical Impression:       ICD-10-CM ICD-9-CM   1. Gastroesophageal reflux disease, unspecified whether esophagitis present  K21.9 530.81                      Disposition:   Disposition: Discharged  Condition: Stable     ED Disposition Condition    Discharge Stable        ED Prescriptions     Medication Sig Dispense Start Date End Date Auth. Provider    sucralfate (CARAFATE) 1 gram tablet  (Status:  Discontinued) Take 1 tablet (1 g total) by mouth 4 (four) times daily. 120 tablet 11/29/2020 11/29/2020 Eleazar Deluna MD    pantoprazole (PROTONIX) 40 MG tablet  (Status: Discontinued) Take 1 tablet (40 mg total) by mouth once daily. 30 tablet 11/29/2020 11/29/2020 Eleazar Deluna MD    pantoprazole (PROTONIX) 40 MG tablet Take 1 tablet (40 mg total) by mouth once daily. 30 tablet 11/29/2020 12/29/2020 Eleazar Deluna MD    sucralfate (CARAFATE) 1 gram tablet Take 1 tablet (1 g total) by mouth 4 (four) times daily. 120 tablet 11/29/2020 12/29/2020 Eleazar Deluna MD        Follow-up Information     Follow up With Specialties Details Why Contact Info    Marky Mills MD Internal Medicine Schedule an appointment as soon as possible for a visit in 2 days  [address]  [facility], [address]  [facility] [address]  [phone]                          Change Of Shift Note     -- I took over this note from the preceding ER physician at shift change   -- His/her documentation and exam is above this line, my documentation is below  -- patient had epigastric/lower chest wall pain this morning, trapped gas per patient  -- states when he arrived to ER he belched with complete resolution of symptoms  -- patient states that he feels much better now and is completely asymptomatic  -- negative D-dimer, normal EKG with 2 sets of negative troponins in the ER today  -- patient with likely GERD symptoms with indigestion, Protonix and Carafate Rx  -- patient states he has no symptoms at this time, will follow-up with his PCP  -- I have also advised routine cardiology follow-up as a matter of safe practice  -- voiced complete understanding return to the ER with any concerns after DC                     Eleazar Deluna MD  11/29/20 2936

## 2020-11-29 NOTE — ED TRIAGE NOTES
Pt to er c/o midsternal CP that began while sitting in [employer] this am--describes as sharp with a period of SOB and R jaw pain that has resolved--pain is now a dull, achy pain--NAD

## 2020-11-29 NOTE — ED TRIAGE NOTES
Pt reports he was in [place] this morning and he started with chest pain. He report he became slightly short of breath as well. The chest pain was mid sternal and achy. He reports the chest pain is 1/10 at this time and dull.

## 2020-12-02 ENCOUNTER — LAB VISIT (OUTPATIENT)
Dept: LAB | Facility: HOSPITAL | Age: 65
End: 2020-12-02
Attending: INTERNAL MEDICINE
Payer: MEDICARE

## 2020-12-02 DIAGNOSIS — E78.5 HYPERLIPIDEMIA, UNSPECIFIED HYPERLIPIDEMIA TYPE: ICD-10-CM

## 2020-12-02 LAB
ALT SERPL W/O P-5'-P-CCNC: 26 U/L (ref 10–44)
AST SERPL-CCNC: 16 U/L (ref 10–40)
CHOLEST SERPL-MCNC: 187 MG/DL (ref 120–199)
CHOLEST/HDLC SERPL: 6 {RATIO} (ref 2–5)
HDLC SERPL-MCNC: 31 MG/DL (ref 40–75)
HDLC SERPL: 16.6 % (ref 20–50)
LDLC SERPL CALC-MCNC: 98 MG/DL (ref 63–159)
NONHDLC SERPL-MCNC: 156 MG/DL
TRIGL SERPL-MCNC: 290 MG/DL (ref 30–150)

## 2020-12-02 PROCEDURE — 80061 LIPID PANEL: CPT

## 2020-12-02 PROCEDURE — 84460 ALANINE AMINO (ALT) (SGPT): CPT

## 2020-12-02 PROCEDURE — 36415 COLL VENOUS BLD VENIPUNCTURE: CPT

## 2020-12-02 PROCEDURE — 84450 TRANSFERASE (AST) (SGOT): CPT

## 2020-12-07 ENCOUNTER — HOSPITAL ENCOUNTER (OUTPATIENT)
Dept: RADIOLOGY | Facility: HOSPITAL | Age: 65
Discharge: HOME | End: 2020-12-07
Attending: INTERNAL MEDICINE
Payer: MEDICARE

## 2020-12-07 ENCOUNTER — OFFICE VISIT (OUTPATIENT)
Dept: INTERNAL MEDICINE | Facility: CLINIC | Age: 65
End: 2020-12-07
Payer: MEDICARE

## 2020-12-07 VITALS
HEIGHT: 70 IN | OXYGEN SATURATION: 96 % | DIASTOLIC BLOOD PRESSURE: 76 MMHG | WEIGHT: 222.88 LBS | SYSTOLIC BLOOD PRESSURE: 130 MMHG | HEART RATE: 85 BPM | BODY MASS INDEX: 31.91 KG/M2

## 2020-12-07 DIAGNOSIS — E78.5 HYPERLIPIDEMIA, UNSPECIFIED HYPERLIPIDEMIA TYPE: ICD-10-CM

## 2020-12-07 DIAGNOSIS — M25.50 ARTHRALGIA, UNSPECIFIED JOINT: ICD-10-CM

## 2020-12-07 DIAGNOSIS — I10 ESSENTIAL HYPERTENSION: ICD-10-CM

## 2020-12-07 DIAGNOSIS — Z00.00 ANNUAL PHYSICAL EXAM: Primary | ICD-10-CM

## 2020-12-07 DIAGNOSIS — R13.10 DYSPHAGIA, UNSPECIFIED TYPE: ICD-10-CM

## 2020-12-07 DIAGNOSIS — Z12.11 COLON CANCER SCREENING: ICD-10-CM

## 2020-12-07 DIAGNOSIS — K21.9 GASTROESOPHAGEAL REFLUX DISEASE WITHOUT ESOPHAGITIS: ICD-10-CM

## 2020-12-07 DIAGNOSIS — K63.5 POLYP OF COLON, UNSPECIFIED PART OF COLON, UNSPECIFIED TYPE: ICD-10-CM

## 2020-12-07 DIAGNOSIS — I25.84 CORONARY ARTERY CALCIFICATION: ICD-10-CM

## 2020-12-07 DIAGNOSIS — M47.817 SPONDYLOSIS OF LUMBOSACRAL REGION WITHOUT MYELOPATHY OR RADICULOPATHY: ICD-10-CM

## 2020-12-07 DIAGNOSIS — I25.10 CORONARY ARTERY CALCIFICATION: ICD-10-CM

## 2020-12-07 PROCEDURE — 3078F DIAST BP <80 MM HG: CPT | Mod: CPTII,S$GLB,, | Performed by: INTERNAL MEDICINE

## 2020-12-07 PROCEDURE — 99214 PR OFFICE/OUTPT VISIT, EST, LEVL IV, 30-39 MIN: ICD-10-PCS | Mod: S$GLB,,, | Performed by: INTERNAL MEDICINE

## 2020-12-07 PROCEDURE — 99499 RISK ADDL DX/OHS AUDIT: ICD-10-PCS | Mod: S$GLB,,, | Performed by: INTERNAL MEDICINE

## 2020-12-07 PROCEDURE — 3288F PR FALLS RISK ASSESSMENT DOCUMENTED: ICD-10-PCS | Mod: CPTII,S$GLB,, | Performed by: INTERNAL MEDICINE

## 2020-12-07 PROCEDURE — 73110 X-RAY EXAM OF WRIST: CPT | Mod: TC,LT

## 2020-12-07 PROCEDURE — 3075F SYST BP GE 130 - 139MM HG: CPT | Mod: CPTII,S$GLB,, | Performed by: INTERNAL MEDICINE

## 2020-12-07 PROCEDURE — 99999 PR PBB SHADOW E&M-EST. PATIENT-LVL V: ICD-10-PCS | Mod: PBBFAC,,, | Performed by: INTERNAL MEDICINE

## 2020-12-07 PROCEDURE — 73110 X-RAY EXAM OF WRIST: CPT | Mod: 26,LT,, | Performed by: RADIOLOGY

## 2020-12-07 PROCEDURE — 1101F PR PT FALLS ASSESS DOC 0-1 FALLS W/OUT INJ PAST YR: ICD-10-PCS | Mod: CPTII,S$GLB,, | Performed by: INTERNAL MEDICINE

## 2020-12-07 PROCEDURE — 3078F PR MOST RECENT DIASTOLIC BLOOD PRESSURE < 80 MM HG: ICD-10-PCS | Mod: CPTII,S$GLB,, | Performed by: INTERNAL MEDICINE

## 2020-12-07 PROCEDURE — 3008F PR BODY MASS INDEX (BMI) DOCUMENTED: ICD-10-PCS | Mod: CPTII,S$GLB,, | Performed by: INTERNAL MEDICINE

## 2020-12-07 PROCEDURE — 99214 OFFICE O/P EST MOD 30 MIN: CPT | Mod: S$GLB,,, | Performed by: INTERNAL MEDICINE

## 2020-12-07 PROCEDURE — 3008F BODY MASS INDEX DOCD: CPT | Mod: CPTII,S$GLB,, | Performed by: INTERNAL MEDICINE

## 2020-12-07 PROCEDURE — 99499 UNLISTED E&M SERVICE: CPT | Mod: S$GLB,,, | Performed by: INTERNAL MEDICINE

## 2020-12-07 PROCEDURE — 1101F PT FALLS ASSESS-DOCD LE1/YR: CPT | Mod: CPTII,S$GLB,, | Performed by: INTERNAL MEDICINE

## 2020-12-07 PROCEDURE — 3288F FALL RISK ASSESSMENT DOCD: CPT | Mod: CPTII,S$GLB,, | Performed by: INTERNAL MEDICINE

## 2020-12-07 PROCEDURE — 3075F PR MOST RECENT SYSTOLIC BLOOD PRESS GE 130-139MM HG: ICD-10-PCS | Mod: CPTII,S$GLB,, | Performed by: INTERNAL MEDICINE

## 2020-12-07 PROCEDURE — 73110 XR WRIST COMPLETE 3 VIEWS LEFT: ICD-10-PCS | Mod: 26,LT,, | Performed by: RADIOLOGY

## 2020-12-07 PROCEDURE — 99999 PR PBB SHADOW E&M-EST. PATIENT-LVL V: CPT | Mod: PBBFAC,,, | Performed by: INTERNAL MEDICINE

## 2020-12-07 RX ORDER — TIZANIDINE 4 MG/1
4 TABLET ORAL EVERY 8 HOURS
Qty: 30 TABLET | Refills: 0 | Status: SHIPPED | OUTPATIENT
Start: 2020-12-07 | End: 2020-12-17

## 2021-10-09 ENCOUNTER — PES CALL (OUTPATIENT)
Dept: ADMINISTRATIVE | Facility: CLINIC | Age: 66
End: 2021-10-09

## 2021-12-01 ENCOUNTER — PES CALL (OUTPATIENT)
Dept: ADMINISTRATIVE | Facility: CLINIC | Age: 66
End: 2021-12-01
Payer: MEDICARE

## 2021-12-16 ENCOUNTER — PATIENT OUTREACH (OUTPATIENT)
Dept: ADMINISTRATIVE | Facility: OTHER | Age: 66
End: 2021-12-16
Payer: MEDICARE

## 2021-12-17 ENCOUNTER — HOSPITAL ENCOUNTER (OUTPATIENT)
Dept: RADIOLOGY | Facility: HOSPITAL | Age: 66
Discharge: HOME | End: 2021-12-17
Attending: ORTHOPAEDIC SURGERY
Payer: MEDICARE

## 2021-12-17 ENCOUNTER — TELEPHONE (OUTPATIENT)
Dept: SPORTS MEDICINE | Facility: CLINIC | Age: 66
End: 2021-12-17

## 2021-12-17 DIAGNOSIS — M25.561 RIGHT KNEE PAIN, UNSPECIFIED CHRONICITY: ICD-10-CM

## 2021-12-23 ENCOUNTER — LAB VISIT (OUTPATIENT)
Dept: LAB | Facility: HOSPITAL | Age: 66
End: 2021-12-23
Attending: INTERNAL MEDICINE
Payer: MEDICARE

## 2021-12-23 ENCOUNTER — OFFICE VISIT (OUTPATIENT)
Dept: INTERNAL MEDICINE | Facility: CLINIC | Age: 66
End: 2021-12-23
Payer: MEDICARE

## 2021-12-23 ENCOUNTER — HOSPITAL ENCOUNTER (OUTPATIENT)
Dept: RADIOLOGY | Facility: HOSPITAL | Age: 66
Discharge: HOME | End: 2021-12-23
Attending: INTERNAL MEDICINE
Payer: MEDICARE

## 2021-12-23 VITALS
TEMPERATURE: 98 F | WEIGHT: 221.31 LBS | SYSTOLIC BLOOD PRESSURE: 142 MMHG | HEIGHT: 70 IN | DIASTOLIC BLOOD PRESSURE: 84 MMHG | BODY MASS INDEX: 31.68 KG/M2 | OXYGEN SATURATION: 97 % | HEART RATE: 83 BPM

## 2021-12-23 DIAGNOSIS — M25.561 CHRONIC PAIN OF RIGHT KNEE: ICD-10-CM

## 2021-12-23 DIAGNOSIS — G89.29 CHRONIC PAIN OF RIGHT KNEE: ICD-10-CM

## 2021-12-23 DIAGNOSIS — Z12.5 PROSTATE CANCER SCREENING: ICD-10-CM

## 2021-12-23 DIAGNOSIS — I10 ESSENTIAL HYPERTENSION: ICD-10-CM

## 2021-12-23 DIAGNOSIS — Z00.00 ANNUAL PHYSICAL EXAM: Primary | ICD-10-CM

## 2021-12-23 DIAGNOSIS — Z00.00 ANNUAL PHYSICAL EXAM: ICD-10-CM

## 2021-12-23 DIAGNOSIS — E78.5 HYPERLIPIDEMIA, UNSPECIFIED HYPERLIPIDEMIA TYPE: ICD-10-CM

## 2021-12-23 LAB
ALBUMIN SERPL BCP-MCNC: 4 G/DL (ref 3.5–5.2)
ALP SERPL-CCNC: 73 U/L (ref 55–135)
ALT SERPL W/O P-5'-P-CCNC: 36 U/L (ref 10–44)
ANION GAP SERPL CALC-SCNC: 9 MMOL/L (ref 8–16)
AST SERPL-CCNC: 16 U/L (ref 10–40)
BASOPHILS # BLD AUTO: 0.05 K/UL (ref 0–0.2)
BASOPHILS NFR BLD: 0.8 % (ref 0–1.9)
BILIRUB SERPL-MCNC: 0.5 MG/DL (ref 0.1–1)
BUN SERPL-MCNC: 13 MG/DL (ref 8–23)
CALCIUM SERPL-MCNC: 10.1 MG/DL (ref 8.7–10.5)
CHLORIDE SERPL-SCNC: 101 MMOL/L (ref 95–110)
CHOLEST SERPL-MCNC: 231 MG/DL (ref 120–199)
CHOLEST/HDLC SERPL: 6.4 {RATIO} (ref 2–5)
CO2 SERPL-SCNC: 29 MMOL/L (ref 23–29)
COMPLEXED PSA SERPL-MCNC: 1.1 NG/ML (ref 0–4)
CREAT SERPL-MCNC: 1 MG/DL (ref 0.5–1.4)
DIFFERENTIAL METHOD: ABNORMAL
EOSINOPHIL # BLD AUTO: 0.2 K/UL (ref 0–0.5)
EOSINOPHIL NFR BLD: 2.8 % (ref 0–8)
ERYTHROCYTE [DISTWIDTH] IN BLOOD BY AUTOMATED COUNT: 12.1 % (ref 11.5–14.5)
EST. GFR  (AFRICAN AMERICAN): >60 ML/MIN/1.73 M^2
EST. GFR  (NON AFRICAN AMERICAN): >60 ML/MIN/1.73 M^2
GLUCOSE SERPL-MCNC: 85 MG/DL (ref 70–110)
HCT VFR BLD AUTO: 42.7 % (ref 40–54)
HDLC SERPL-MCNC: 36 MG/DL (ref 40–75)
HDLC SERPL: 15.6 % (ref 20–50)
HGB BLD-MCNC: 14.3 G/DL (ref 14–18)
IMM GRANULOCYTES # BLD AUTO: 0.03 K/UL (ref 0–0.04)
IMM GRANULOCYTES NFR BLD AUTO: 0.5 % (ref 0–0.5)
LDLC SERPL CALC-MCNC: ABNORMAL MG/DL (ref 63–159)
LYMPHOCYTES # BLD AUTO: 2.6 K/UL (ref 1–4.8)
LYMPHOCYTES NFR BLD: 39.8 % (ref 18–48)
MCH RBC QN AUTO: 31.2 PG (ref 27–31)
MCHC RBC AUTO-ENTMCNC: 33.5 G/DL (ref 32–36)
MCV RBC AUTO: 93 FL (ref 82–98)
MONOCYTES # BLD AUTO: 0.5 K/UL (ref 0.3–1)
MONOCYTES NFR BLD: 8.1 % (ref 4–15)
NEUTROPHILS # BLD AUTO: 3.1 K/UL (ref 1.8–7.7)
NEUTROPHILS NFR BLD: 48 % (ref 38–73)
NONHDLC SERPL-MCNC: 195 MG/DL
NRBC BLD-RTO: 0 /100 WBC
PLATELET # BLD AUTO: 180 K/UL (ref 150–450)
PMV BLD AUTO: 9.8 FL (ref 9.2–12.9)
POTASSIUM SERPL-SCNC: 4.3 MMOL/L (ref 3.5–5.1)
PROT SERPL-MCNC: 7.7 G/DL (ref 6–8.4)
RBC # BLD AUTO: 4.59 M/UL (ref 4.6–6.2)
SODIUM SERPL-SCNC: 139 MMOL/L (ref 136–145)
TRIGL SERPL-MCNC: 558 MG/DL (ref 30–150)
TSH SERPL DL<=0.005 MIU/L-ACNC: 1 UIU/ML (ref 0.4–4)
WBC # BLD AUTO: 6.4 K/UL (ref 3.9–12.7)

## 2021-12-23 PROCEDURE — 3008F PR BODY MASS INDEX (BMI) DOCUMENTED: ICD-10-PCS | Mod: CPTII,S$GLB,, | Performed by: INTERNAL MEDICINE

## 2021-12-23 PROCEDURE — 99499 UNLISTED E&M SERVICE: CPT | Mod: S$GLB,,, | Performed by: INTERNAL MEDICINE

## 2021-12-23 PROCEDURE — 99999 PR PBB SHADOW E&M-EST. PATIENT-LVL III: CPT | Mod: PBBFAC,,, | Performed by: INTERNAL MEDICINE

## 2021-12-23 PROCEDURE — 84153 ASSAY OF PSA TOTAL: CPT | Performed by: INTERNAL MEDICINE

## 2021-12-23 PROCEDURE — 3079F PR MOST RECENT DIASTOLIC BLOOD PRESSURE 80-89 MM HG: ICD-10-PCS | Mod: CPTII,S$GLB,, | Performed by: INTERNAL MEDICINE

## 2021-12-23 PROCEDURE — 1101F PT FALLS ASSESS-DOCD LE1/YR: CPT | Mod: CPTII,S$GLB,, | Performed by: INTERNAL MEDICINE

## 2021-12-23 PROCEDURE — 99214 PR OFFICE/OUTPT VISIT, EST, LEVL IV, 30-39 MIN: ICD-10-PCS | Mod: S$GLB,,, | Performed by: INTERNAL MEDICINE

## 2021-12-23 PROCEDURE — 1160F RVW MEDS BY RX/DR IN RCRD: CPT | Mod: CPTII,S$GLB,, | Performed by: INTERNAL MEDICINE

## 2021-12-23 PROCEDURE — 36415 COLL VENOUS BLD VENIPUNCTURE: CPT | Performed by: INTERNAL MEDICINE

## 2021-12-23 PROCEDURE — 1160F PR REVIEW ALL MEDS BY PRESCRIBER/CLIN PHARMACIST DOCUMENTED: ICD-10-PCS | Mod: CPTII,S$GLB,, | Performed by: INTERNAL MEDICINE

## 2021-12-23 PROCEDURE — 1101F PR PT FALLS ASSESS DOC 0-1 FALLS W/OUT INJ PAST YR: ICD-10-PCS | Mod: CPTII,S$GLB,, | Performed by: INTERNAL MEDICINE

## 2021-12-23 PROCEDURE — 73560 X-RAY EXAM OF KNEE 1 OR 2: CPT | Mod: 26,LT,, | Performed by: RADIOLOGY

## 2021-12-23 PROCEDURE — 80053 COMPREHEN METABOLIC PANEL: CPT | Performed by: INTERNAL MEDICINE

## 2021-12-23 PROCEDURE — 84443 ASSAY THYROID STIM HORMONE: CPT | Performed by: INTERNAL MEDICINE

## 2021-12-23 PROCEDURE — 3077F SYST BP >= 140 MM HG: CPT | Mod: CPTII,S$GLB,, | Performed by: INTERNAL MEDICINE

## 2021-12-23 PROCEDURE — 73560 X-RAY EXAM OF KNEE 1 OR 2: CPT | Mod: TC,LT

## 2021-12-23 PROCEDURE — 73562 X-RAY EXAM OF KNEE 3: CPT | Mod: 26,RT,, | Performed by: RADIOLOGY

## 2021-12-23 PROCEDURE — 3288F PR FALLS RISK ASSESSMENT DOCUMENTED: ICD-10-PCS | Mod: CPTII,S$GLB,, | Performed by: INTERNAL MEDICINE

## 2021-12-23 PROCEDURE — 3077F PR MOST RECENT SYSTOLIC BLOOD PRESSURE >= 140 MM HG: ICD-10-PCS | Mod: CPTII,S$GLB,, | Performed by: INTERNAL MEDICINE

## 2021-12-23 PROCEDURE — 1125F AMNT PAIN NOTED PAIN PRSNT: CPT | Mod: CPTII,S$GLB,, | Performed by: INTERNAL MEDICINE

## 2021-12-23 PROCEDURE — 99214 OFFICE O/P EST MOD 30 MIN: CPT | Mod: S$GLB,,, | Performed by: INTERNAL MEDICINE

## 2021-12-23 PROCEDURE — 1125F PR PAIN SEVERITY QUANTIFIED, PAIN PRESENT: ICD-10-PCS | Mod: CPTII,S$GLB,, | Performed by: INTERNAL MEDICINE

## 2021-12-23 PROCEDURE — 80061 LIPID PANEL: CPT | Performed by: INTERNAL MEDICINE

## 2021-12-23 PROCEDURE — 99499 RISK ADDL DX/OHS AUDIT: ICD-10-PCS | Mod: S$GLB,,, | Performed by: INTERNAL MEDICINE

## 2021-12-23 PROCEDURE — 3008F BODY MASS INDEX DOCD: CPT | Mod: CPTII,S$GLB,, | Performed by: INTERNAL MEDICINE

## 2021-12-23 PROCEDURE — 73562 XR KNEE ORTHO RIGHT: ICD-10-PCS | Mod: 26,RT,, | Performed by: RADIOLOGY

## 2021-12-23 PROCEDURE — 99999 PR PBB SHADOW E&M-EST. PATIENT-LVL III: ICD-10-PCS | Mod: PBBFAC,,, | Performed by: INTERNAL MEDICINE

## 2021-12-23 PROCEDURE — 3288F FALL RISK ASSESSMENT DOCD: CPT | Mod: CPTII,S$GLB,, | Performed by: INTERNAL MEDICINE

## 2021-12-23 PROCEDURE — 3079F DIAST BP 80-89 MM HG: CPT | Mod: CPTII,S$GLB,, | Performed by: INTERNAL MEDICINE

## 2021-12-23 PROCEDURE — 4010F ACE/ARB THERAPY RXD/TAKEN: CPT | Mod: CPTII,S$GLB,, | Performed by: INTERNAL MEDICINE

## 2021-12-23 PROCEDURE — 73560 XR KNEE ORTHO RIGHT: ICD-10-PCS | Mod: 26,LT,, | Performed by: RADIOLOGY

## 2021-12-23 PROCEDURE — 85025 COMPLETE CBC W/AUTO DIFF WBC: CPT | Performed by: INTERNAL MEDICINE

## 2021-12-23 PROCEDURE — 1159F PR MEDICATION LIST DOCUMENTED IN MEDICAL RECORD: ICD-10-PCS | Mod: CPTII,S$GLB,, | Performed by: INTERNAL MEDICINE

## 2021-12-23 PROCEDURE — 1159F MED LIST DOCD IN RCRD: CPT | Mod: CPTII,S$GLB,, | Performed by: INTERNAL MEDICINE

## 2021-12-23 PROCEDURE — 4010F PR ACE/ARB THEARPY RXD/TAKEN: ICD-10-PCS | Mod: CPTII,S$GLB,, | Performed by: INTERNAL MEDICINE

## 2021-12-23 RX ORDER — MELOXICAM 15 MG/1
15 TABLET ORAL DAILY
Qty: 14 TABLET | Refills: 0 | Status: SHIPPED | OUTPATIENT
Start: 2021-12-23 | End: 2022-01-06

## 2022-01-03 ENCOUNTER — HOSPITAL ENCOUNTER (OUTPATIENT)
Dept: RADIOLOGY | Facility: HOSPITAL | Age: 67
Discharge: HOME | End: 2022-01-03
Attending: INTERNAL MEDICINE
Payer: MEDICARE

## 2022-01-03 DIAGNOSIS — S83.241A ACUTE MEDIAL MENISCUS TEAR OF RIGHT KNEE, INITIAL ENCOUNTER: ICD-10-CM

## 2022-01-03 PROCEDURE — 73721 MRI JNT OF LWR EXTRE W/O DYE: CPT | Mod: 26,RT,, | Performed by: RADIOLOGY

## 2022-01-03 PROCEDURE — 73721 MRI KNEE WITHOUT CONTRAST RIGHT: ICD-10-PCS | Mod: 26,RT,, | Performed by: RADIOLOGY

## 2022-01-03 PROCEDURE — 73721 MRI JNT OF LWR EXTRE W/O DYE: CPT | Mod: TC,RT

## 2022-01-04 ENCOUNTER — TELEPHONE (OUTPATIENT)
Dept: INTERNAL MEDICINE | Facility: CLINIC | Age: 67
End: 2022-01-04
Payer: MEDICARE

## 2022-01-04 DIAGNOSIS — S83.241A TEAR OF MEDIAL MENISCUS OF RIGHT KNEE, CURRENT, UNSPECIFIED TEAR TYPE, INITIAL ENCOUNTER: Primary | ICD-10-CM

## 2022-01-04 DIAGNOSIS — S83.242A TEAR OF MEDIAL MENISCUS OF LEFT KNEE, CURRENT, UNSPECIFIED TEAR TYPE, INITIAL ENCOUNTER: ICD-10-CM

## 2022-01-06 ENCOUNTER — OFFICE VISIT (OUTPATIENT)
Dept: SPORTS MEDICINE | Facility: CLINIC | Age: 67
End: 2022-01-06
Payer: MEDICARE

## 2022-01-06 VITALS
HEIGHT: 70 IN | DIASTOLIC BLOOD PRESSURE: 74 MMHG | BODY MASS INDEX: 31.78 KG/M2 | SYSTOLIC BLOOD PRESSURE: 114 MMHG | HEART RATE: 79 BPM | WEIGHT: 222 LBS

## 2022-01-06 DIAGNOSIS — S83.241A TEAR OF MEDIAL MENISCUS OF RIGHT KNEE, CURRENT, UNSPECIFIED TEAR TYPE, INITIAL ENCOUNTER: ICD-10-CM

## 2022-01-06 PROCEDURE — 3078F DIAST BP <80 MM HG: CPT | Mod: CPTII,S$GLB,, | Performed by: ORTHOPAEDIC SURGERY

## 2022-01-06 PROCEDURE — 1159F PR MEDICATION LIST DOCUMENTED IN MEDICAL RECORD: ICD-10-PCS | Mod: CPTII,S$GLB,, | Performed by: ORTHOPAEDIC SURGERY

## 2022-01-06 PROCEDURE — 3074F PR MOST RECENT SYSTOLIC BLOOD PRESSURE < 130 MM HG: ICD-10-PCS | Mod: CPTII,S$GLB,, | Performed by: ORTHOPAEDIC SURGERY

## 2022-01-06 PROCEDURE — 3288F PR FALLS RISK ASSESSMENT DOCUMENTED: ICD-10-PCS | Mod: CPTII,S$GLB,, | Performed by: ORTHOPAEDIC SURGERY

## 2022-01-06 PROCEDURE — 3008F BODY MASS INDEX DOCD: CPT | Mod: CPTII,S$GLB,, | Performed by: ORTHOPAEDIC SURGERY

## 2022-01-06 PROCEDURE — 3288F FALL RISK ASSESSMENT DOCD: CPT | Mod: CPTII,S$GLB,, | Performed by: ORTHOPAEDIC SURGERY

## 2022-01-06 PROCEDURE — 1160F PR REVIEW ALL MEDS BY PRESCRIBER/CLIN PHARMACIST DOCUMENTED: ICD-10-PCS | Mod: CPTII,S$GLB,, | Performed by: ORTHOPAEDIC SURGERY

## 2022-01-06 PROCEDURE — 99999 PR PBB SHADOW E&M-EST. PATIENT-LVL III: CPT | Mod: PBBFAC,,, | Performed by: ORTHOPAEDIC SURGERY

## 2022-01-06 PROCEDURE — 3008F PR BODY MASS INDEX (BMI) DOCUMENTED: ICD-10-PCS | Mod: CPTII,S$GLB,, | Performed by: ORTHOPAEDIC SURGERY

## 2022-01-06 PROCEDURE — 1101F PR PT FALLS ASSESS DOC 0-1 FALLS W/OUT INJ PAST YR: ICD-10-PCS | Mod: CPTII,S$GLB,, | Performed by: ORTHOPAEDIC SURGERY

## 2022-01-06 PROCEDURE — 99999 PR PBB SHADOW E&M-EST. PATIENT-LVL III: ICD-10-PCS | Mod: PBBFAC,,, | Performed by: ORTHOPAEDIC SURGERY

## 2022-01-06 PROCEDURE — 99203 OFFICE O/P NEW LOW 30 MIN: CPT | Mod: S$GLB,,, | Performed by: ORTHOPAEDIC SURGERY

## 2022-01-06 PROCEDURE — 1160F RVW MEDS BY RX/DR IN RCRD: CPT | Mod: CPTII,S$GLB,, | Performed by: ORTHOPAEDIC SURGERY

## 2022-01-06 PROCEDURE — 1159F MED LIST DOCD IN RCRD: CPT | Mod: CPTII,S$GLB,, | Performed by: ORTHOPAEDIC SURGERY

## 2022-01-06 PROCEDURE — 3074F SYST BP LT 130 MM HG: CPT | Mod: CPTII,S$GLB,, | Performed by: ORTHOPAEDIC SURGERY

## 2022-01-06 PROCEDURE — 1125F AMNT PAIN NOTED PAIN PRSNT: CPT | Mod: CPTII,S$GLB,, | Performed by: ORTHOPAEDIC SURGERY

## 2022-01-06 PROCEDURE — 3078F PR MOST RECENT DIASTOLIC BLOOD PRESSURE < 80 MM HG: ICD-10-PCS | Mod: CPTII,S$GLB,, | Performed by: ORTHOPAEDIC SURGERY

## 2022-01-06 PROCEDURE — 1101F PT FALLS ASSESS-DOCD LE1/YR: CPT | Mod: CPTII,S$GLB,, | Performed by: ORTHOPAEDIC SURGERY

## 2022-01-06 PROCEDURE — 1125F PR PAIN SEVERITY QUANTIFIED, PAIN PRESENT: ICD-10-PCS | Mod: CPTII,S$GLB,, | Performed by: ORTHOPAEDIC SURGERY

## 2022-01-06 PROCEDURE — 99203 PR OFFICE/OUTPT VISIT, NEW, LEVL III, 30-44 MIN: ICD-10-PCS | Mod: S$GLB,,, | Performed by: ORTHOPAEDIC SURGERY
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
## 2022-01-18 ENCOUNTER — CLINICAL SUPPORT (OUTPATIENT)
Dept: REHABILITATION | Facility: HOSPITAL | Age: 67
End: 2022-01-18
Payer: MEDICARE

## 2022-01-18 DIAGNOSIS — G89.29 CHRONIC PAIN OF RIGHT KNEE: ICD-10-CM

## 2022-01-18 DIAGNOSIS — M25.561 CHRONIC PAIN OF RIGHT KNEE: ICD-10-CM

## 2022-01-18 DIAGNOSIS — M53.87 SCIATICA ASSOCIATED WITH DISORDER OF LUMBOSACRAL SPINE: ICD-10-CM

## 2022-01-18 PROCEDURE — 97162 PT EVAL MOD COMPLEX 30 MIN: CPT | Mod: PN
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
## 2022-01-18 NOTE — PATIENT INSTRUCTIONS
Press Up (Extension)        Place hands in a position for a half push-up. Press top half of body upward using arms. Let lower back sag. Hold ____ seconds. Lower body.  Repeat ____ times. Do ____ sessions per day.     https://[employer]us/245     Copyright © [employer]. All rights reserved.       Piriformis Stretch        Lying on back, pull right knee toward opposite shoulder. Hold ____ seconds.  Repeat ____ times. Do ____ sessions per day.     https://[employer]/257     Copyright © [employer]. All rights reserved.     Piriformis Stretch, Sitting        Sit, one ankle on opposite knee, same-side hand on crossed knee. Push down on knee, keeping spine straight. Lean torso forward, with flat back, until tension is felt in hamstrings and gluteals of crossed-leg side. Hold ___ seconds.   Repeat ___ times per session. Do ___ sessions per day.    Copyright © [employer]. All rights reserved.     Patient Education       Sciatica Exercises   About this topic   Sciatica is pain, weakness, numbness, or tingling that runs from your buttocks down the back of your leg to your feet. It happens when something is pressing on, or bothering, the sciatic nerve. This large nerve starts in your lower back. It runs all the way down the back of your leg to your foot.  The exercises for sciatica may be different based on the cause of your pain. Exercise may be slightly uncomfortable, but you should not have sharp pains. If you do get sharp pains, stop what you are doing. If the sharp pains continue, call your doctor.  General   Before starting with a program, ask your doctor if you are healthy enough to do these exercises. Your doctor may have you work with a chiropractor, , or physical therapist to make a safe exercise program to meet your needs.  Stretching Exercises   Stretching exercises keep your muscles flexible. They also stop them from getting tight. Start by doing each of these stretches 2 to 3 times. In order for your body to make changes, you  will need to hold these stretches for 20 to 30 seconds. Try to do the stretches 2 to 3 times each day. Do all exercises slowly.  · Single knee to chest stretches ? Lie on your back. Pull one knee towards your chest until you feel a stretch in your lower back and buttock area. Repeat with the other knee. If you have knee problems, pull your knee up by grabbing the back of your thigh instead of the front of your knee. You can also do this exercise by grabbing both knees at the same time.  · Deep hip stretches lying down ? Lie on your back and bend one knee, keeping that foot flat on the floor. Cross the other leg over your knee. Grab the thigh of the leg that has the foot on the floor. Slowly, pull the bottom leg towards your chest until you feel a stretch in the other buttock. Repeat using the opposite leg as the bottom leg.  · Deep hip stretches sitting ? Sit on the floor with both legs straight. Take one leg and cross it over the other leg so that the foot of your top leg is next to your outer knee. Now, take the elbow on the opposite side of your bent knee and bring it to the outside of the bent knee. With your elbow, slowly push the bent knee further across the body to get a good stretch in the hip.  Strengthening Exercises   Strengthening exercises keep your muscles firm and strong. Start by repeating each exercise 2 to 3 times. Work up to doing each exercise 10 times. Try to do the exercises 2 to 3 times each day. Hold each exercise for 3 to 5 seconds. Do all exercises slowly.  · Hip lifts ? Lie on your back with your knees bent and feet flat on the floor. Tighten your stomach muscles and lift your buttocks off the floor. Relax.  · Arm and leg lifts on hands and knees ? Start on your hands and knees. With all of these exercises, keep your back as level as possible. If you are having trouble with this, you may want to put a small object on your back such as a book. If it falls off, you are not keeping your back  level enough during the exercise.  ? Lift one arm up to shoulder level and hold. Lower it back down. Now, lift up the other arm and hold.  ? Lift one leg up and kick it straight out until it is in line with your back and hold. Lower it back down. Now, lift up the other leg and hold.  ? Lift one arm and the OPPOSITE leg up at the same time and hold. Lower them down. Now, repeat using the other arm and leg. This is a very hard exercise. It may take time to be able to do this.             What will the results be?   · Better flexibility  · Less pain  · Less numbness and tingling  · More leg strength  · Easier to walk and do other activities  · Increased core strength  Helpful tips   · Stay active and work out to keep your muscles strong and flexible.  · Keep a healthy weight to avoid putting too much stress on your spine. Eat a healthy diet to keep your muscles healthy.  · Be sure you do not hold your breath when exercising. This can raise your blood pressure. If you tend to hold your breath, try counting out loud when exercising. If any exercise bothers you, stop right away.  · Always warm up before stretching. Heated muscles stretch much easier than cool muscles. Stretching cool muscles can lead to injury.  · Try walking or cycling at an easy pace for a few minutes to warm up your muscles. Do this again after exercising.  · Never bounce when doing stretches.  · Apply ice to the low back on the side of leg pain.  · Doing exercises before a meal may be a good way to get into a routine.  Where can I learn more?   NHS Choices  http://www.nhs.uk/[employer]/Backpain/Pages/sciatica-exercises.aspx   Last Reviewed Date   2021-08-30  Consumer Information Use and Disclaimer   This information is not specific medical advice and does not replace information you receive from your health care provider. This is only a brief summary of general information. It does NOT include all information about conditions, illnesses, injuries,  tests, procedures, treatments, therapies, discharge instructions or life-style choices that may apply to you. You must talk with your health care provider for complete information about your health and treatment options. This information should not be used to decide whether or not to accept your health care providers advice, instructions or recommendations. Only your health care provider has the knowledge and training to provide advice that is right for you.  Copyright   Copyright © 2021 [employer] and its affiliates and/or licensors. All rights reserved.

## 2022-01-25 ENCOUNTER — PES CALL (OUTPATIENT)
Dept: ADMINISTRATIVE | Facility: CLINIC | Age: 67
End: 2022-01-25
Payer: MEDICARE

## 2022-02-01 ENCOUNTER — CLINICAL SUPPORT (OUTPATIENT)
Dept: REHABILITATION | Facility: HOSPITAL | Age: 67
End: 2022-02-01
Payer: MEDICARE

## 2022-02-01 DIAGNOSIS — M25.561 CHRONIC PAIN OF RIGHT KNEE: ICD-10-CM

## 2022-02-01 DIAGNOSIS — M53.87 SCIATICA ASSOCIATED WITH DISORDER OF LUMBOSACRAL SPINE: ICD-10-CM

## 2022-02-01 DIAGNOSIS — G89.29 CHRONIC PAIN OF RIGHT KNEE: ICD-10-CM

## 2022-02-01 PROCEDURE — 97110 THERAPEUTIC EXERCISES: CPT | Mod: PN

## 2022-02-01 NOTE — PATIENT INSTRUCTIONS
Strengthening: Quadriceps Set    Tighten muscles on top of thighs by pushing knees down into surface. You can also put a towel roll under ankle for additional extension purposes. Hold 3 seconds.  Repeat 10 times each leg per set. Do 2-3 sets per session. Do 1-2 sessions per day.        Straight Leg Raise     With right leg straight, other leg bent, raise straight leg until knees are even. Slowly lower. Roll on your side and repeat lifting top leg up, on other side, lifting bottom leg up, and on stomach kicking back (squeezing your rear end before you kick back).  Repeat 10 times right leg per set. Do 2-3 sets per session. Do 1-2 sessions per day.         Strengthening: Hip Adduction - Isometric    Sit back or lie down with ball or folded pillow between knees, and squeeze knees together. Hold 3 seconds.  Repeat 10 times per set. Do 3 sets per session. Do 1-2 sessions per day.      Strengthening: Hip Abductor - Resisted    Lie flat with band looped around both legs above knees, and push thighs apart, ensuring right and left move together.  Repeat 10 times per set. Do 2-3 sets per session. Do 1-2 sessions per day.      Clam Shells    Lie on side with knees bent. Raise top leg, keeping knee bent and ankles together. Do not let your hips roll back as your raise your leg.  Repeat 10 times each leg per set. Do 2-3 sets per session. Do 1-2 sessions per day.      Heel Squeeze (Prone)    Abdomen supported and knee slightly wider than hips, bend knees and gently squeeze heels together. Hold 3 seconds.  Repeat 10 times per set. Do 3 sets per session. Do 1-2 sessions per day.      Slow Stand to Sit    Stand up, using hands if needed. Keep chest and head upright, bend your knees, don't use hands, and slowly lower back to the chair. Move as slowly as you can.  Repeat 10 times per set. Do 2-3 sets per session. Do 1-2 sessions per day.      Hip Hiking    Stand with right side toward counter. Hike hip closer to counter while keeping  knee mostly straight, bringing that side pelvis toward ribs.  Repeat 10 times each leg per set. Do 2-3 sets per session. Do 1-2 sessions per day.    Copyright © [employer]. All rights reserved.    Heel Raises        Stand with support. Tighten pelvic floor and hold. With knees straight, raise heels off ground. Hold 3 seconds. Relax for 2 seconds.  Repeat 10-20 times. Do 3 times a day.    Copyright © [employer]. All rights reserved.

## 2022-02-05 NOTE — PROGRESS NOTES
[facility]   Physical Therapy Treatment Note     [facility]  [facility] [id number]    Therapy Diagnosis:   Encounter Diagnoses   Name Primary?    Chronic pain of right knee     Sciatica associated with disorder of lumbosacral spine      Physician: Santy Leavitt MD    Visit Date: 2022    [Physician Orders: PT Eval and Treat   Medical Diagnosis from Referral: Tear of medial meniscus of right knee, current, unspecified tear type, initial encounter [S83.241A]  Evaluation Date: 2022  Authorization Period Expiration: 2/15/2022  Plan of Care Expiration: 2022  Progress Note Due: 2022  Visit # / Visits authorized:   PTA Visit #: 0/5     Time In: 8:05  Time Out: 8:30  Total Billable Time: 25 minutes    SUBJECTIVE     Pt reports: his HEP is helping his back pain and he would like to focus on his knee pain.  He was compliant with home exercise program.  Response to previous treatment: improved back pain  Functional change: imporved functional mobility due to  back pain    Pain: 3/10  Location:  knee      OBJECTIVE     Objective Measures updated at progress report unless specified.     Treatment     Demarco received the treatments listed below:      therapeutic exercises to develop strength and ROM for 25 minutes including:  Demo and practice of HEP (see Patietn Instructions)    Patient Education and Home Exercises     Home Exercises Provided and Patient Education Provided     Education provided:   - Educated on relationship between knee and back pain and progressions of HEP    Written Home Exercises Provided: yes. Exercises were reviewed and Demarco was able to demonstrate them prior to the end of the session.  Demarco demonstrated good  understanding of the education provided. See EMR under Patient Instructions for exercises provided during therapy sessions    ASSESSMENT     Patient does not want weekly treatment due to mask policy and  is happy with coming to clinic for progression of HEP and assessment of current function. Pt will continue to be progressed as tolerated.    Demarco Is progressing well towards his goals.   Pt prognosis is Good.     Pt will continue to benefit from skilled outpatient physical therapy to address the deficits listed in the problem list box on initial evaluation, provide pt/family education and to maximize pt's level of independence in the home and community environment.     Pt's spiritual, cultural and educational needs considered and pt agreeable to plan of care and goals.     Anticipated barriers to physical therapy: mask policy    Goals:   Short Term Goals (4 Weeks):   1. Pt will report 20% reduced pain within the lumbar spine for ease with walking.  2. Pt will demonstrate 1/3 improvement MMT in BLE.  4. Pt will demonstrate static standing balance on BLE for 30 seconds without obvious instability or use of UE assistance.  5. Pt will demonstrate improved lumbar ROM by 25% in all directions for ease with picking an object up from the floor.     Long Term Goals (8 Weeks):   1.Pt will report <1/10 pain within the lumbar spine for ease with ADL's.  2. Pt will demonstrate 50% improvement of hamstring and hip flexor length in BLE for ease with ambulation.  3. Pt will be independent with HEP for maintenance of improvements gained in therapy sessions.  4. Pt will demonstrate 4+/5 strength or greater in BLE for improved functional mobility.  5. Pt will be independent and compliant with HEP.    PLAN     Plan of care Certification: 1/18/2022 to 4/18/2022.     Outpatient Physical Therapy 1-4 times monthly for 8 weeks to include the following interventions: Manual Therapy, Moist Heat/ Ice, Neuromuscular Re-ed, Patient Education, Therapeutic Activities and Therapeutic Exercise.        Siomara Castro, PT, DPT

## 2022-02-10 ENCOUNTER — PATIENT OUTREACH (OUTPATIENT)
Dept: ADMINISTRATIVE | Facility: HOSPITAL | Age: 67
End: 2022-02-10
Payer: MEDICARE

## 2022-02-10 NOTE — PROGRESS NOTES
Health Maintenance Due   Topic Date Due    COVID-19 Vaccine (1) Never done    TETANUS VACCINE  Never done    Aspirin/Antiplatelet Therapy  Never done    Shingles Vaccine (1 of 2) Never done    Colorectal Cancer Screening  04/07/2021    Influenza Vaccine (1) Never done     Triggered LINKS.  Updated Care Everywhere.  Chart was reviewed as part of the PHN Attestation for 2021.

## 2022-03-18 ENCOUNTER — PES CALL (OUTPATIENT)
Dept: ADMINISTRATIVE | Facility: CLINIC | Age: 67
End: 2022-03-18
Payer: MEDICARE

## 2022-04-27 NOTE — PROGRESS NOTES
MEDICAL HISTORY:  Hypertension.  Hyperlipidemia.  BPH.  Lumbar degenerative disc disease with previous back surgeries.  Meniere's disease.  Gastroesophageal reflux disease.  Nephrolithiasis.  Prostatitis.  Adenomatous colon polyp in .  Chronic kidney disease, stage II-III.     SOCIAL HISTORY:  Tobacco and alcohol use -- none.        67-year-old male    He made this appointment to address potential cardiovascular issues.  What prompted this is that his sister who is age 68 had bypass surgery about 5 years ago and most recently had to have a stent placed.    He had an older brother that  of a cranial aneurysm but was told of having significant coronary artery disease.    Mother lived to age 80 but had cardiovascular disease in her 60s.    He has 2 younger sisters in which he is not aware of cardiovascular disease    Father  in his 80s and had a stent placed in his 80s but was not aware of cardiovascular disease early    The other thing is back in 2016 a coronary CT scan showed a score 813. He was evaluated by Cardiology in recommend a stress test every couple years his last stress test 2020 was negative for ischemia      The main risk factor which has been under suboptimal control has been hyperlipidemia, mixed hyperlipidemia.  He has never been able to tolerate any statins which included pravastatin, atorvastatin, rosuvastatin.  At 1 point he tried fenofibrate and may have had an issue.  He was previously on the medicine Zetia from what I can tell did have problems.  When he was seen in 2021. The lipid profile was abnormal.  He was off Zetia for while.  Was recommend to restarted.  He thinks he may have picked up the medicine but only took for while but presently is not on any medication    Currently he is also off the blood pressure medicine benazepril      He reports no chest pain palpitations shortness of breath.  Physical activity is limited but he is still able to exert himself  without difficulty    Exam  Weight 220 lb  Pulse 68  Blood pressure 144 over 92  Neck 2+ carotid pulses no bruits  Chest clear breath sounds  Heart regular rate rhythm no murmurs    Impression  Coronary artery calcification  Hypertension  Hyperlipidemia  Family history cardiovascular disease    Plan  Range medially for fasting chemistry lipid profile  For the time being restart Zetia 10 mg  Referral to Cardiology determine if any further testing is needed, he patient even inquired about getting a coronary angiogram.  However I explained to him main approach is to aggressively control his risk factors.  If he can tolerate statins whatsoever than the consider medications like pradulent or Repatha    Also will start losartan 25 mg daily

## 2022-04-28 ENCOUNTER — OFFICE VISIT (OUTPATIENT)
Dept: INTERNAL MEDICINE | Facility: CLINIC | Age: 67
End: 2022-04-28
Payer: MEDICARE

## 2022-04-28 VITALS
SYSTOLIC BLOOD PRESSURE: 140 MMHG | OXYGEN SATURATION: 98 % | WEIGHT: 220.69 LBS | HEIGHT: 70 IN | HEART RATE: 66 BPM | RESPIRATION RATE: 16 BRPM | DIASTOLIC BLOOD PRESSURE: 98 MMHG | TEMPERATURE: 98 F | BODY MASS INDEX: 31.59 KG/M2

## 2022-04-28 DIAGNOSIS — I10 ESSENTIAL HYPERTENSION: ICD-10-CM

## 2022-04-28 DIAGNOSIS — E78.5 HYPERLIPIDEMIA, UNSPECIFIED HYPERLIPIDEMIA TYPE: ICD-10-CM

## 2022-04-28 DIAGNOSIS — R93.1 AGATSTON CORONARY ARTERY CALCIUM SCORE GREATER THAN 400: Primary | ICD-10-CM

## 2022-04-28 PROCEDURE — 1160F RVW MEDS BY RX/DR IN RCRD: CPT | Mod: CPTII,S$GLB,, | Performed by: INTERNAL MEDICINE

## 2022-04-28 PROCEDURE — 3008F BODY MASS INDEX DOCD: CPT | Mod: CPTII,S$GLB,, | Performed by: INTERNAL MEDICINE

## 2022-04-28 PROCEDURE — 1160F PR REVIEW ALL MEDS BY PRESCRIBER/CLIN PHARMACIST DOCUMENTED: ICD-10-PCS | Mod: CPTII,S$GLB,, | Performed by: INTERNAL MEDICINE

## 2022-04-28 PROCEDURE — 99999 PR PBB SHADOW E&M-EST. PATIENT-LVL IV: CPT | Mod: PBBFAC,,, | Performed by: INTERNAL MEDICINE

## 2022-04-28 PROCEDURE — 1101F PR PT FALLS ASSESS DOC 0-1 FALLS W/OUT INJ PAST YR: ICD-10-PCS | Mod: CPTII,S$GLB,, | Performed by: INTERNAL MEDICINE

## 2022-04-28 PROCEDURE — 1159F PR MEDICATION LIST DOCUMENTED IN MEDICAL RECORD: ICD-10-PCS | Mod: CPTII,S$GLB,, | Performed by: INTERNAL MEDICINE

## 2022-04-28 PROCEDURE — 3080F PR MOST RECENT DIASTOLIC BLOOD PRESSURE >= 90 MM HG: ICD-10-PCS | Mod: CPTII,S$GLB,, | Performed by: INTERNAL MEDICINE

## 2022-04-28 PROCEDURE — 3288F PR FALLS RISK ASSESSMENT DOCUMENTED: ICD-10-PCS | Mod: CPTII,S$GLB,, | Performed by: INTERNAL MEDICINE

## 2022-04-28 PROCEDURE — 99214 OFFICE O/P EST MOD 30 MIN: CPT | Mod: S$GLB,,, | Performed by: INTERNAL MEDICINE

## 2022-04-28 PROCEDURE — 3288F FALL RISK ASSESSMENT DOCD: CPT | Mod: CPTII,S$GLB,, | Performed by: INTERNAL MEDICINE

## 2022-04-28 PROCEDURE — 1126F AMNT PAIN NOTED NONE PRSNT: CPT | Mod: CPTII,S$GLB,, | Performed by: INTERNAL MEDICINE

## 2022-04-28 PROCEDURE — 3008F PR BODY MASS INDEX (BMI) DOCUMENTED: ICD-10-PCS | Mod: CPTII,S$GLB,, | Performed by: INTERNAL MEDICINE

## 2022-04-28 PROCEDURE — 1101F PT FALLS ASSESS-DOCD LE1/YR: CPT | Mod: CPTII,S$GLB,, | Performed by: INTERNAL MEDICINE

## 2022-04-28 PROCEDURE — 3080F DIAST BP >= 90 MM HG: CPT | Mod: CPTII,S$GLB,, | Performed by: INTERNAL MEDICINE

## 2022-04-28 PROCEDURE — 99999 PR PBB SHADOW E&M-EST. PATIENT-LVL IV: ICD-10-PCS | Mod: PBBFAC,,, | Performed by: INTERNAL MEDICINE

## 2022-04-28 PROCEDURE — 1126F PR PAIN SEVERITY QUANTIFIED, NO PAIN PRESENT: ICD-10-PCS | Mod: CPTII,S$GLB,, | Performed by: INTERNAL MEDICINE

## 2022-04-28 PROCEDURE — 3077F PR MOST RECENT SYSTOLIC BLOOD PRESSURE >= 140 MM HG: ICD-10-PCS | Mod: CPTII,S$GLB,, | Performed by: INTERNAL MEDICINE

## 2022-04-28 PROCEDURE — 99214 PR OFFICE/OUTPT VISIT, EST, LEVL IV, 30-39 MIN: ICD-10-PCS | Mod: S$GLB,,, | Performed by: INTERNAL MEDICINE

## 2022-04-28 PROCEDURE — 3077F SYST BP >= 140 MM HG: CPT | Mod: CPTII,S$GLB,, | Performed by: INTERNAL MEDICINE

## 2022-04-28 PROCEDURE — 1159F MED LIST DOCD IN RCRD: CPT | Mod: CPTII,S$GLB,, | Performed by: INTERNAL MEDICINE

## 2022-04-28 RX ORDER — EZETIMIBE 10 MG/1
10 TABLET ORAL DAILY
Qty: 30 TABLET | Refills: 2 | Status: SHIPPED | OUTPATIENT
Start: 2022-04-28 | End: 2022-04-29

## 2022-04-28 RX ORDER — LOSARTAN POTASSIUM 25 MG/1
25 TABLET ORAL DAILY
Qty: 30 TABLET | Refills: 2 | Status: SHIPPED | OUTPATIENT
Start: 2022-04-28 | End: 2022-04-28 | Stop reason: SDUPTHER

## 2022-04-28 RX ORDER — LOSARTAN POTASSIUM 25 MG/1
25 TABLET ORAL DAILY
Qty: 30 TABLET | Refills: 2 | Status: SHIPPED | OUTPATIENT
Start: 2022-04-28 | End: 2022-04-29

## 2022-04-28 RX ORDER — EZETIMIBE 10 MG/1
10 TABLET ORAL DAILY
Qty: 30 TABLET | Refills: 2 | Status: SHIPPED | OUTPATIENT
Start: 2022-04-28 | End: 2022-04-28 | Stop reason: SDUPTHER

## 2022-04-29 ENCOUNTER — DOCUMENTATION ONLY (OUTPATIENT)
Dept: INTERNAL MEDICINE | Facility: CLINIC | Age: 67
End: 2022-04-29
Payer: MEDICARE

## 2022-04-29 ENCOUNTER — TELEPHONE (OUTPATIENT)
Dept: INTERNAL MEDICINE | Facility: CLINIC | Age: 67
End: 2022-04-29

## 2022-04-29 ENCOUNTER — OFFICE VISIT (OUTPATIENT)
Dept: CARDIOLOGY | Facility: CLINIC | Age: 67
End: 2022-04-29
Payer: MEDICARE

## 2022-04-29 ENCOUNTER — LAB VISIT (OUTPATIENT)
Dept: LAB | Facility: HOSPITAL | Age: 67
End: 2022-04-29
Attending: INTERNAL MEDICINE
Payer: MEDICARE

## 2022-04-29 VITALS
WEIGHT: 221.69 LBS | RESPIRATION RATE: 20 BRPM | SYSTOLIC BLOOD PRESSURE: 143 MMHG | DIASTOLIC BLOOD PRESSURE: 77 MMHG | BODY MASS INDEX: 31.74 KG/M2 | HEIGHT: 70 IN | HEART RATE: 84 BPM

## 2022-04-29 DIAGNOSIS — E78.5 HYPERLIPIDEMIA, UNSPECIFIED HYPERLIPIDEMIA TYPE: ICD-10-CM

## 2022-04-29 DIAGNOSIS — I25.10 CORONARY ARTERY DISEASE INVOLVING NATIVE CORONARY ARTERY OF NATIVE HEART WITHOUT ANGINA PECTORIS: ICD-10-CM

## 2022-04-29 DIAGNOSIS — R93.1 AGATSTON CORONARY ARTERY CALCIUM SCORE GREATER THAN 400: ICD-10-CM

## 2022-04-29 DIAGNOSIS — Z00.00 ANNUAL PHYSICAL EXAM: Primary | ICD-10-CM

## 2022-04-29 DIAGNOSIS — I10 ESSENTIAL HYPERTENSION: ICD-10-CM

## 2022-04-29 DIAGNOSIS — Z12.5 ENCOUNTER FOR SCREENING FOR MALIGNANT NEOPLASM OF PROSTATE: ICD-10-CM

## 2022-04-29 DIAGNOSIS — R73.03 PREDIABETES: ICD-10-CM

## 2022-04-29 LAB
ALBUMIN SERPL BCP-MCNC: 4 G/DL (ref 3.5–5.2)
ALP SERPL-CCNC: 74 U/L (ref 55–135)
ALT SERPL W/O P-5'-P-CCNC: 18 U/L (ref 10–44)
ANION GAP SERPL CALC-SCNC: 13 MMOL/L (ref 8–16)
AST SERPL-CCNC: 16 U/L (ref 10–40)
BILIRUB SERPL-MCNC: 0.5 MG/DL (ref 0.1–1)
BUN SERPL-MCNC: 14 MG/DL (ref 8–23)
CALCIUM SERPL-MCNC: 9.7 MG/DL (ref 8.7–10.5)
CHLORIDE SERPL-SCNC: 103 MMOL/L (ref 95–110)
CHOLEST SERPL-MCNC: 195 MG/DL (ref 120–199)
CHOLEST/HDLC SERPL: 5.4 {RATIO} (ref 2–5)
CO2 SERPL-SCNC: 27 MMOL/L (ref 23–29)
CREAT SERPL-MCNC: 1.2 MG/DL (ref 0.5–1.4)
EST. GFR  (AFRICAN AMERICAN): >60 ML/MIN/1.73 M^2
EST. GFR  (NON AFRICAN AMERICAN): >60 ML/MIN/1.73 M^2
ESTIMATED AVG GLUCOSE: 128 MG/DL (ref 68–131)
GLUCOSE SERPL-MCNC: 103 MG/DL (ref 70–110)
HBA1C MFR BLD: 6.1 % (ref 4–5.6)
HDLC SERPL-MCNC: 36 MG/DL (ref 40–75)
HDLC SERPL: 18.5 % (ref 20–50)
LDLC SERPL CALC-MCNC: 125.6 MG/DL (ref 63–159)
NONHDLC SERPL-MCNC: 159 MG/DL
POTASSIUM SERPL-SCNC: 4.2 MMOL/L (ref 3.5–5.1)
PROT SERPL-MCNC: 7.4 G/DL (ref 6–8.4)
SODIUM SERPL-SCNC: 143 MMOL/L (ref 136–145)
TRIGL SERPL-MCNC: 167 MG/DL (ref 30–150)

## 2022-04-29 PROCEDURE — 93000 ELECTROCARDIOGRAM COMPLETE: CPT | Mod: S$GLB,,, | Performed by: INTERNAL MEDICINE

## 2022-04-29 PROCEDURE — 3078F DIAST BP <80 MM HG: CPT | Mod: CPTII,S$GLB,, | Performed by: INTERNAL MEDICINE

## 2022-04-29 PROCEDURE — 99999 PR PBB SHADOW E&M-EST. PATIENT-LVL III: CPT | Mod: PBBFAC,,, | Performed by: INTERNAL MEDICINE

## 2022-04-29 PROCEDURE — 1159F MED LIST DOCD IN RCRD: CPT | Mod: CPTII,S$GLB,, | Performed by: INTERNAL MEDICINE

## 2022-04-29 PROCEDURE — 1160F RVW MEDS BY RX/DR IN RCRD: CPT | Mod: CPTII,S$GLB,, | Performed by: INTERNAL MEDICINE

## 2022-04-29 PROCEDURE — 3078F PR MOST RECENT DIASTOLIC BLOOD PRESSURE < 80 MM HG: ICD-10-PCS | Mod: CPTII,S$GLB,, | Performed by: INTERNAL MEDICINE

## 2022-04-29 PROCEDURE — 3008F PR BODY MASS INDEX (BMI) DOCUMENTED: ICD-10-PCS | Mod: CPTII,S$GLB,, | Performed by: INTERNAL MEDICINE

## 2022-04-29 PROCEDURE — 93000 EKG 12-LEAD: ICD-10-PCS | Mod: S$GLB,,, | Performed by: INTERNAL MEDICINE

## 2022-04-29 PROCEDURE — 80061 LIPID PANEL: CPT | Performed by: INTERNAL MEDICINE

## 2022-04-29 PROCEDURE — 3077F SYST BP >= 140 MM HG: CPT | Mod: CPTII,S$GLB,, | Performed by: INTERNAL MEDICINE

## 2022-04-29 PROCEDURE — 99204 OFFICE O/P NEW MOD 45 MIN: CPT | Mod: S$GLB,,, | Performed by: INTERNAL MEDICINE

## 2022-04-29 PROCEDURE — 1159F PR MEDICATION LIST DOCUMENTED IN MEDICAL RECORD: ICD-10-PCS | Mod: CPTII,S$GLB,, | Performed by: INTERNAL MEDICINE

## 2022-04-29 PROCEDURE — 1126F AMNT PAIN NOTED NONE PRSNT: CPT | Mod: CPTII,S$GLB,, | Performed by: INTERNAL MEDICINE

## 2022-04-29 PROCEDURE — 3008F BODY MASS INDEX DOCD: CPT | Mod: CPTII,S$GLB,, | Performed by: INTERNAL MEDICINE

## 2022-04-29 PROCEDURE — 1126F PR PAIN SEVERITY QUANTIFIED, NO PAIN PRESENT: ICD-10-PCS | Mod: CPTII,S$GLB,, | Performed by: INTERNAL MEDICINE

## 2022-04-29 PROCEDURE — 83036 HEMOGLOBIN GLYCOSYLATED A1C: CPT | Mod: GA | Performed by: INTERNAL MEDICINE

## 2022-04-29 PROCEDURE — 3077F PR MOST RECENT SYSTOLIC BLOOD PRESSURE >= 140 MM HG: ICD-10-PCS | Mod: CPTII,S$GLB,, | Performed by: INTERNAL MEDICINE

## 2022-04-29 PROCEDURE — 4010F PR ACE/ARB THEARPY RXD/TAKEN: ICD-10-PCS | Mod: CPTII,S$GLB,, | Performed by: INTERNAL MEDICINE

## 2022-04-29 PROCEDURE — 80053 COMPREHEN METABOLIC PANEL: CPT | Performed by: INTERNAL MEDICINE

## 2022-04-29 PROCEDURE — 3288F FALL RISK ASSESSMENT DOCD: CPT | Mod: CPTII,S$GLB,, | Performed by: INTERNAL MEDICINE

## 2022-04-29 PROCEDURE — 1160F PR REVIEW ALL MEDS BY PRESCRIBER/CLIN PHARMACIST DOCUMENTED: ICD-10-PCS | Mod: CPTII,S$GLB,, | Performed by: INTERNAL MEDICINE

## 2022-04-29 PROCEDURE — 36415 COLL VENOUS BLD VENIPUNCTURE: CPT | Performed by: INTERNAL MEDICINE

## 2022-04-29 PROCEDURE — 99999 PR PBB SHADOW E&M-EST. PATIENT-LVL III: ICD-10-PCS | Mod: PBBFAC,,, | Performed by: INTERNAL MEDICINE

## 2022-04-29 PROCEDURE — 1101F PR PT FALLS ASSESS DOC 0-1 FALLS W/OUT INJ PAST YR: ICD-10-PCS | Mod: CPTII,S$GLB,, | Performed by: INTERNAL MEDICINE

## 2022-04-29 PROCEDURE — 4010F ACE/ARB THERAPY RXD/TAKEN: CPT | Mod: CPTII,S$GLB,, | Performed by: INTERNAL MEDICINE

## 2022-04-29 PROCEDURE — 3288F PR FALLS RISK ASSESSMENT DOCUMENTED: ICD-10-PCS | Mod: CPTII,S$GLB,, | Performed by: INTERNAL MEDICINE

## 2022-04-29 PROCEDURE — 99204 PR OFFICE/OUTPT VISIT, NEW, LEVL IV, 45-59 MIN: ICD-10-PCS | Mod: S$GLB,,, | Performed by: INTERNAL MEDICINE

## 2022-04-29 PROCEDURE — 1101F PT FALLS ASSESS-DOCD LE1/YR: CPT | Mod: CPTII,S$GLB,, | Performed by: INTERNAL MEDICINE

## 2022-04-29 NOTE — ASSESSMENT & PLAN NOTE
Blood pressures are slightly above goal and the patient understands that I recommend attempt to reduce blood pressure.  He has tentative about medicines at this time and wants to focus on weight loss.  He has set a goal weight of less than 200 lb with reassessment of blood pressure at that time.

## 2022-04-29 NOTE — ASSESSMENT & PLAN NOTE
The patient has hyperlipidemia.  He he has not tolerated multiple statin therapies due to myopathy.  Cholesterol was better on ezetimibe, but still not at goal.  Regardless patient stop this medicine.  The patient understands that we would like to target LDL and triglyceride reduction.  I would consider PCSK9 inhibitor, but the patient is tentative at this moment in time.  He is agreeable to committing to a vegan diet at this time with the goal of aggressive weight loss as described above.  We will reassess lipids at 6 months and discuss further at that time.

## 2022-04-29 NOTE — ASSESSMENT & PLAN NOTE
The patient has asymptomatic coronary artery disease evidenced by calcium score.  Activity levels are acceptable.  Will focus on risk factor modification at this time.  He had a stress echo in September 2020 that was negative for ischemia.

## 2022-04-29 NOTE — PROGRESS NOTES
Internal medicine note  Test results were reviewed with the patient.  There is a degree of pre diabetes with hemoglobin A1c is 6.1.  Lipid profile was reviewed.      He met with the cardiologist today and actually is was elected to put a hold on losartan Zetia, to initiate a vegan diet follow-up in 6 months  Certainly the approach will be weight loss

## 2022-04-29 NOTE — PROGRESS NOTES
HPI:  This is a pleasant 67-year-old male with a history of hypertension, hyperlipidemia, and elevated calcium score presenting for initial evaluation by me.    Patient comes in today for cardiovascular risk evaluation.  He reports a strong family history of cardiovascular disease including his brother and sister.  He himself denies any previous history of myocardial infarction, percutaneous coronary intervention, stroke, peripheral arterial disease, peripheral intervention, or VTE.    At baseline the patient reports pretty good health.  He denies any issues with chest pain, shortness of breath, or dyspnea on exertion.  He does not have any dedicated exercise time, however, he is extremely active in his life.  He continues to work doing manual labor and has no cardiovascular limitation in his daily tasks.  Additionally he is active around the house and completes ADLs as well as work projects without any issue.  The patient does have limitations related to lower back pain and radiculopathy.    He was previously on losartan and ezetimibe therapy, but discontinued this in January.  He has previously tried different statins with significant myopathy.    PHYSICAL EXAM:  Vitals:    04/29/22 0939   BP: (!) 143/77   Pulse: 84   Resp: 20       Physical Exam  Constitutional:       Appearance: Normal appearance.   Neck:      Vascular: No carotid bruit or JVD.   Cardiovascular:      Rate and Rhythm: Normal rate and regular rhythm.      Pulses:           Radial pulses are 2+ on the right side and 2+ on the left side.        Dorsalis pedis pulses are 1+ on the right side and 1+ on the left side.      Heart sounds: S1 normal and S2 normal. No murmur heard.    No S3 sounds.   Pulmonary:      Effort: Pulmonary effort is normal.      Breath sounds: Normal breath sounds. No rales.   Neurological:      Mental Status: He is alert.       LABS/CARDIAC TESTS:  December 2021 hemoglobin is 14.3.  Creatinine is 1.0 with BUN of 13. Total  cholesterol 231 and HDL 36. Triglycerides 558 with non-HDL cholesterol of 195. TSH normal. 2020 LDL 98 and TGs 290.  ECG today demonstrates sinus rhythm with no Q-waves or ST changes.  Stress echo 2020 showed no evidence of ischemia.  5 minutes and 29 seconds on a high ramp protocol.  Normal TTE at baseline and with stress.  CT cardiac scoring 2016 with a calcium score of 813.     ASSESSMENT & PLAN:    Coronary artery disease involving native coronary artery of native heart without angina pectoris  The patient has asymptomatic coronary artery disease evidenced by calcium score.  Activity levels are acceptable.  Will focus on risk factor modification at this time.  He had a stress echo in September 2020 that was negative for ischemia.    Essential hypertension  Blood pressures are slightly above goal and the patient understands that I recommend attempt to reduce blood pressure.  He has tentative about medicines at this time and wants to focus on weight loss.  He has set a goal weight of less than 200 lb with reassessment of blood pressure at that time.    Hyperlipidemia  The patient has hyperlipidemia.  He he has not tolerated multiple statin therapies due to myopathy.  Cholesterol was better on ezetimibe, but still not at goal.  Regardless patient stop this medicine.  The patient understands that we would like to target LDL and triglyceride reduction.  I would consider PCSK9 inhibitor, but the patient is tentative at this moment in time.  He is agreeable to committing to a vegan diet at this time with the goal of aggressive weight loss as described above.  We will reassess lipids at 6 months and discuss further at that time.      Agatston coronary artery calcium score greater than 400  -     Ambulatory referral/consult to Cardiology    Essential hypertension  -     Ambulatory referral/consult to Cardiology  -     Basic Metabolic Panel; Future; Expected date: 04/29/2022    Hyperlipidemia, unspecified hyperlipidemia  type  -     Ambulatory referral/consult to Cardiology  -     Lipid Panel; Future; Expected date: 04/29/2022    Coronary artery disease involving native coronary artery of native heart without angina pectoris  -     IN OFFICE EKG 12-LEAD (to [place])        Dirk Marti MD

## 2022-05-10 ENCOUNTER — PATIENT OUTREACH (OUTPATIENT)
Dept: ADMINISTRATIVE | Facility: HOSPITAL | Age: 67
End: 2022-05-10
Payer: MEDICARE

## 2022-05-10 NOTE — PROGRESS NOTES
Health Maintenance Due   Topic Date Due    COVID-19 Vaccine (1) Never done    TETANUS VACCINE  Never done    Aspirin/Antiplatelet Therapy  Never done    Shingles Vaccine (1 of 2) Never done    Colorectal Cancer Screening  04/07/2021     Triggered LINKS. Updated Care Everywhere. Chart review completed as part of N attestation.

## 2022-05-25 ENCOUNTER — PES CALL (OUTPATIENT)
Dept: ADMINISTRATIVE | Facility: CLINIC | Age: 67
End: 2022-05-25
Payer: MEDICARE

## 2022-09-21 ENCOUNTER — OFFICE VISIT (OUTPATIENT)
Dept: INTERNAL MEDICINE | Facility: CLINIC | Age: 67
End: 2022-09-21
Payer: MEDICARE

## 2022-09-21 ENCOUNTER — LAB VISIT (OUTPATIENT)
Dept: LAB | Facility: HOSPITAL | Age: 67
End: 2022-09-21
Attending: INTERNAL MEDICINE
Payer: MEDICARE

## 2022-09-21 VITALS
HEIGHT: 70 IN | OXYGEN SATURATION: 98 % | HEART RATE: 76 BPM | DIASTOLIC BLOOD PRESSURE: 82 MMHG | SYSTOLIC BLOOD PRESSURE: 140 MMHG | WEIGHT: 203.25 LBS | BODY MASS INDEX: 29.1 KG/M2

## 2022-09-21 DIAGNOSIS — E78.5 HYPERLIPIDEMIA, UNSPECIFIED HYPERLIPIDEMIA TYPE: ICD-10-CM

## 2022-09-21 DIAGNOSIS — R42 VERTIGO: Primary | ICD-10-CM

## 2022-09-21 DIAGNOSIS — R73.03 PREDIABETES: ICD-10-CM

## 2022-09-21 PROBLEM — I70.0 AORTIC ATHEROSCLEROSIS: Status: RESOLVED | Noted: 2017-05-17 | Resolved: 2022-09-21

## 2022-09-21 LAB
ANION GAP SERPL CALC-SCNC: 7 MMOL/L (ref 8–16)
BUN SERPL-MCNC: 15 MG/DL (ref 8–23)
CALCIUM SERPL-MCNC: 9.8 MG/DL (ref 8.7–10.5)
CHLORIDE SERPL-SCNC: 104 MMOL/L (ref 95–110)
CO2 SERPL-SCNC: 27 MMOL/L (ref 23–29)
CREAT SERPL-MCNC: 1.1 MG/DL (ref 0.5–1.4)
EST. GFR  (NO RACE VARIABLE): >60 ML/MIN/1.73 M^2
ESTIMATED AVG GLUCOSE: 108 MG/DL (ref 68–131)
GLUCOSE SERPL-MCNC: 104 MG/DL (ref 70–110)
HBA1C MFR BLD: 5.4 % (ref 4–5.6)
POTASSIUM SERPL-SCNC: 3.6 MMOL/L (ref 3.5–5.1)
SODIUM SERPL-SCNC: 138 MMOL/L (ref 136–145)

## 2022-09-21 PROCEDURE — 99999 PR PBB SHADOW E&M-EST. PATIENT-LVL III: ICD-10-PCS | Mod: PBBFAC,,, | Performed by: INTERNAL MEDICINE

## 2022-09-21 PROCEDURE — 1159F PR MEDICATION LIST DOCUMENTED IN MEDICAL RECORD: ICD-10-PCS | Mod: CPTII,S$GLB,, | Performed by: INTERNAL MEDICINE

## 2022-09-21 PROCEDURE — 1126F AMNT PAIN NOTED NONE PRSNT: CPT | Mod: CPTII,S$GLB,, | Performed by: INTERNAL MEDICINE

## 2022-09-21 PROCEDURE — 3008F BODY MASS INDEX DOCD: CPT | Mod: CPTII,S$GLB,, | Performed by: INTERNAL MEDICINE

## 2022-09-21 PROCEDURE — 3077F SYST BP >= 140 MM HG: CPT | Mod: CPTII,S$GLB,, | Performed by: INTERNAL MEDICINE

## 2022-09-21 PROCEDURE — 1126F PR PAIN SEVERITY QUANTIFIED, NO PAIN PRESENT: ICD-10-PCS | Mod: CPTII,S$GLB,, | Performed by: INTERNAL MEDICINE

## 2022-09-21 PROCEDURE — 1159F MED LIST DOCD IN RCRD: CPT | Mod: CPTII,S$GLB,, | Performed by: INTERNAL MEDICINE

## 2022-09-21 PROCEDURE — 3044F PR MOST RECENT HEMOGLOBIN A1C LEVEL <7.0%: ICD-10-PCS | Mod: CPTII,S$GLB,, | Performed by: INTERNAL MEDICINE

## 2022-09-21 PROCEDURE — 36415 COLL VENOUS BLD VENIPUNCTURE: CPT | Performed by: INTERNAL MEDICINE

## 2022-09-21 PROCEDURE — 4010F PR ACE/ARB THEARPY RXD/TAKEN: ICD-10-PCS | Mod: CPTII,S$GLB,, | Performed by: INTERNAL MEDICINE

## 2022-09-21 PROCEDURE — 3008F PR BODY MASS INDEX (BMI) DOCUMENTED: ICD-10-PCS | Mod: CPTII,S$GLB,, | Performed by: INTERNAL MEDICINE

## 2022-09-21 PROCEDURE — 80048 BASIC METABOLIC PNL TOTAL CA: CPT | Performed by: INTERNAL MEDICINE

## 2022-09-21 PROCEDURE — 99214 PR OFFICE/OUTPT VISIT, EST, LEVL IV, 30-39 MIN: ICD-10-PCS | Mod: S$GLB,,, | Performed by: INTERNAL MEDICINE

## 2022-09-21 PROCEDURE — 3077F PR MOST RECENT SYSTOLIC BLOOD PRESSURE >= 140 MM HG: ICD-10-PCS | Mod: CPTII,S$GLB,, | Performed by: INTERNAL MEDICINE

## 2022-09-21 PROCEDURE — 4010F ACE/ARB THERAPY RXD/TAKEN: CPT | Mod: CPTII,S$GLB,, | Performed by: INTERNAL MEDICINE

## 2022-09-21 PROCEDURE — 3044F HG A1C LEVEL LT 7.0%: CPT | Mod: CPTII,S$GLB,, | Performed by: INTERNAL MEDICINE

## 2022-09-21 PROCEDURE — 83036 HEMOGLOBIN GLYCOSYLATED A1C: CPT | Performed by: INTERNAL MEDICINE

## 2022-09-21 PROCEDURE — 3079F PR MOST RECENT DIASTOLIC BLOOD PRESSURE 80-89 MM HG: ICD-10-PCS | Mod: CPTII,S$GLB,, | Performed by: INTERNAL MEDICINE

## 2022-09-21 PROCEDURE — 3079F DIAST BP 80-89 MM HG: CPT | Mod: CPTII,S$GLB,, | Performed by: INTERNAL MEDICINE

## 2022-09-21 PROCEDURE — 99214 OFFICE O/P EST MOD 30 MIN: CPT | Mod: S$GLB,,, | Performed by: INTERNAL MEDICINE

## 2022-09-21 PROCEDURE — 99999 PR PBB SHADOW E&M-EST. PATIENT-LVL III: CPT | Mod: PBBFAC,,, | Performed by: INTERNAL MEDICINE

## 2022-09-21 RX ORDER — MECLIZINE HYDROCHLORIDE 25 MG/1
25 TABLET ORAL 3 TIMES DAILY PRN
Qty: 15 TABLET | Refills: 0 | Status: SHIPPED | OUTPATIENT
Start: 2022-09-21 | End: 2022-09-21 | Stop reason: SDUPTHER

## 2022-09-21 RX ORDER — MECLIZINE HYDROCHLORIDE 25 MG/1
25 TABLET ORAL 3 TIMES DAILY PRN
Qty: 15 TABLET | Refills: 0 | Status: SHIPPED | OUTPATIENT
Start: 2022-09-21

## 2022-09-21 NOTE — PROGRESS NOTES
MEDICAL HISTORY:  Hypertension.  Hyperlipidemia.  prediabetes  BPH.  Lumbar degenerative disc disease with previous back surgeries.  Meniere's disease.  Gastroesophageal reflux disease.  Nephrolithiasis.  Prostatitis.  Adenomatous colon polyp in 2003.  Chronic kidney disease, stage II-IIIa     SOCIAL HISTORY:  Tobacco and alcohol use -- none.        67-year-old male     Since Friday September 16th he has been having recurrent bouts of dizziness.  Lot times a dizziness will come on after waking up in the morning.  It feels like he is spinning and off-balance.  When he 1st habits September 16th he was driving working had to turn around.  For the next couple of days it recurs with every with set up.  A couple days ago he did not have it as bad.  But he started noticing yesterday and today.  His vision will feel distorted.  There is no acute hearing changes although he does have chronic tinnitus.  There is no headaches on a nausea.  No problems with the use of extremities.  In the past he was given a diagnosis of Meniere's disease but he has not had about vertigo    Presently he is not on any prescription medicine.  In the past he was on medication for blood pressure and cholesterol but he has deferred on taking this because it made him feel bad.  On his last blood test hemoglobin A1c was 6 consistent with pre diabetes    Examination   Weight 203 lb   Pulse 72   Blood pressure 136/62  2+ carotid pulses no bruits  Chest clear breath sounds   Heart regular rate rhythm no murmurs  Negative Romberg testing   Positive nystagmus with sitting up and lying down    Impression   Acute vertigo   Hyperlipidemia   Pre diabetes  History of hypertension  History of chronic kidney disease stage 2/3 a    Plan   Antivert 25 mg 3 times a day as needed for 5 days   Labs of basic metabolic profile hemoglobin A1c in lipid    Continue maintain proper hydration as he is doing

## 2022-09-22 ENCOUNTER — PES CALL (OUTPATIENT)
Dept: ADMINISTRATIVE | Facility: CLINIC | Age: 67
End: 2022-09-22
Payer: MEDICARE

## 2022-09-26 ENCOUNTER — PES CALL (OUTPATIENT)
Dept: ADMINISTRATIVE | Facility: CLINIC | Age: 67
End: 2022-09-26
Payer: MEDICARE

## 2022-10-03 ENCOUNTER — PES CALL (OUTPATIENT)
Dept: ADMINISTRATIVE | Facility: CLINIC | Age: 67
End: 2022-10-03
Payer: MEDICARE

## 2022-10-11 ENCOUNTER — DOCUMENTATION ONLY (OUTPATIENT)
Dept: INTERNAL MEDICINE | Facility: CLINIC | Age: 67
End: 2022-10-11
Payer: MEDICARE

## 2022-10-11 NOTE — PROGRESS NOTES
Internal medicine note    In reference to his last visit, basic metabolic profile and hemoglobin A1c look much improved, due to weight loss      He still having dizziness in appears to be getting worse since his last visit.  He informs me that a month prior to his visit he had a fall backwards which he landed on both his head and neck feeling he is reporting neck pain pain in the side of his head and ongoing dizziness.  Because of such will request MRI the cervical spine and brain

## 2022-10-17 ENCOUNTER — HOSPITAL ENCOUNTER (OUTPATIENT)
Dept: RADIOLOGY | Facility: HOSPITAL | Age: 67
Discharge: HOME | End: 2022-10-17
Attending: INTERNAL MEDICINE
Payer: MEDICARE

## 2022-10-17 DIAGNOSIS — M54.2 CERVICALGIA: ICD-10-CM

## 2022-10-17 DIAGNOSIS — R42 DIZZINESS AND GIDDINESS: ICD-10-CM

## 2022-10-17 PROCEDURE — 72141 MRI NECK SPINE W/O DYE: CPT | Mod: TC

## 2022-10-17 PROCEDURE — 70551 MRI BRAIN STEM W/O DYE: CPT | Mod: TC

## 2022-10-17 PROCEDURE — 72141 MRI NECK SPINE W/O DYE: CPT | Mod: 26,,, | Performed by: RADIOLOGY

## 2022-10-17 PROCEDURE — 70551 MRI BRAIN WITHOUT CONTRAST: ICD-10-PCS | Mod: 26,,, | Performed by: RADIOLOGY

## 2022-10-17 PROCEDURE — 70551 MRI BRAIN STEM W/O DYE: CPT | Mod: 26,,, | Performed by: RADIOLOGY

## 2022-10-17 PROCEDURE — 72141 MRI CERVICAL SPINE WITHOUT CONTRAST: ICD-10-PCS | Mod: 26,,, | Performed by: RADIOLOGY

## 2022-10-19 ENCOUNTER — TELEPHONE (OUTPATIENT)
Dept: NEUROSURGERY | Facility: CLINIC | Age: 67
End: 2022-10-19
Payer: MEDICARE

## 2022-10-19 ENCOUNTER — DOCUMENTATION ONLY (OUTPATIENT)
Dept: INTERNAL MEDICINE | Facility: CLINIC | Age: 67
End: 2022-10-19
Payer: MEDICARE

## 2022-10-19 NOTE — TELEPHONE ENCOUNTER
Called and spoke with pt. MRI/MRA scheduled. NP appt scheduled with Dr. Juarez. Upon scheduling imaging was flagged out of network. Pt opted to keep appts and he is going to follow up with ins co.

## 2022-10-19 NOTE — PROGRESS NOTES
Internal medicine note      MRI of the brain and cervical vertebrae reviewed with the patient    MRI the brain showed evidence for chronic microvascular ischemic change with a degree of atrophy involving the cerebellar and cerebral areas    MRI the cervical vertebrae noted degenerative changes with evidence for spinal cord compression particularly at the lower vertebrae to set C5-6 and C6-7.    He has episodic neck pain with radiculopathy going to both shoulders.    Nothing was mention regard to myelomalacia    The reason why the 2 test was performed because of having a fall in a ditch and landed on his head and neck and was having persistent vertigo symptoms    He still experiences vertigo symptoms but not as prominent    Will arrange to also have MRI of the neck and brain because of this but also referral to neuro surgery. the spinal cord compression is reported as significant

## 2022-10-19 NOTE — TELEPHONE ENCOUNTER
Attempted to reach pt to discuss scheduling his MRIs ordered by Lina (referring provider) and an appt w/Carolyne for stenosis.  LVM requesting a return call.

## 2022-10-24 ENCOUNTER — OFFICE VISIT (OUTPATIENT)
Dept: NEUROSURGERY | Facility: CLINIC | Age: 67
End: 2022-10-24
Payer: MEDICARE

## 2022-10-24 VITALS
SYSTOLIC BLOOD PRESSURE: 151 MMHG | HEART RATE: 83 BPM | TEMPERATURE: 98 F | HEIGHT: 70 IN | DIASTOLIC BLOOD PRESSURE: 104 MMHG | WEIGHT: 203.25 LBS | BODY MASS INDEX: 29.1 KG/M2

## 2022-10-24 DIAGNOSIS — M48.02 CERVICAL SPINAL STENOSIS: Primary | ICD-10-CM

## 2022-10-24 DIAGNOSIS — M48.02 SPINAL STENOSIS, CERVICAL REGION: ICD-10-CM

## 2022-10-24 PROCEDURE — 3288F FALL RISK ASSESSMENT DOCD: CPT | Mod: CPTII,S$GLB,, | Performed by: NURSE PRACTITIONER

## 2022-10-24 PROCEDURE — 1159F PR MEDICATION LIST DOCUMENTED IN MEDICAL RECORD: ICD-10-PCS | Mod: CPTII,S$GLB,, | Performed by: NURSE PRACTITIONER

## 2022-10-24 PROCEDURE — 3077F PR MOST RECENT SYSTOLIC BLOOD PRESSURE >= 140 MM HG: ICD-10-PCS | Mod: CPTII,S$GLB,, | Performed by: NURSE PRACTITIONER

## 2022-10-24 PROCEDURE — 1125F PR PAIN SEVERITY QUANTIFIED, PAIN PRESENT: ICD-10-PCS | Mod: CPTII,S$GLB,, | Performed by: NURSE PRACTITIONER

## 2022-10-24 PROCEDURE — 99204 OFFICE O/P NEW MOD 45 MIN: CPT | Mod: S$GLB,,, | Performed by: NURSE PRACTITIONER

## 2022-10-24 PROCEDURE — 1160F PR REVIEW ALL MEDS BY PRESCRIBER/CLIN PHARMACIST DOCUMENTED: ICD-10-PCS | Mod: CPTII,S$GLB,, | Performed by: NURSE PRACTITIONER

## 2022-10-24 PROCEDURE — 99999 PR PBB SHADOW E&M-EST. PATIENT-LVL IV: ICD-10-PCS | Mod: PBBFAC,,, | Performed by: NURSE PRACTITIONER

## 2022-10-24 PROCEDURE — 3077F SYST BP >= 140 MM HG: CPT | Mod: CPTII,S$GLB,, | Performed by: NURSE PRACTITIONER

## 2022-10-24 PROCEDURE — 3288F PR FALLS RISK ASSESSMENT DOCUMENTED: ICD-10-PCS | Mod: CPTII,S$GLB,, | Performed by: NURSE PRACTITIONER

## 2022-10-24 PROCEDURE — 3044F HG A1C LEVEL LT 7.0%: CPT | Mod: CPTII,S$GLB,, | Performed by: NURSE PRACTITIONER

## 2022-10-24 PROCEDURE — 3080F DIAST BP >= 90 MM HG: CPT | Mod: CPTII,S$GLB,, | Performed by: NURSE PRACTITIONER

## 2022-10-24 PROCEDURE — 3044F PR MOST RECENT HEMOGLOBIN A1C LEVEL <7.0%: ICD-10-PCS | Mod: CPTII,S$GLB,, | Performed by: NURSE PRACTITIONER

## 2022-10-24 PROCEDURE — 1160F RVW MEDS BY RX/DR IN RCRD: CPT | Mod: CPTII,S$GLB,, | Performed by: NURSE PRACTITIONER

## 2022-10-24 PROCEDURE — 4010F ACE/ARB THERAPY RXD/TAKEN: CPT | Mod: CPTII,S$GLB,, | Performed by: NURSE PRACTITIONER

## 2022-10-24 PROCEDURE — 1100F PTFALLS ASSESS-DOCD GE2>/YR: CPT | Mod: CPTII,S$GLB,, | Performed by: NURSE PRACTITIONER

## 2022-10-24 PROCEDURE — 1159F MED LIST DOCD IN RCRD: CPT | Mod: CPTII,S$GLB,, | Performed by: NURSE PRACTITIONER

## 2022-10-24 PROCEDURE — 3080F PR MOST RECENT DIASTOLIC BLOOD PRESSURE >= 90 MM HG: ICD-10-PCS | Mod: CPTII,S$GLB,, | Performed by: NURSE PRACTITIONER

## 2022-10-24 PROCEDURE — 4010F PR ACE/ARB THEARPY RXD/TAKEN: ICD-10-PCS | Mod: CPTII,S$GLB,, | Performed by: NURSE PRACTITIONER

## 2022-10-24 PROCEDURE — 99999 PR PBB SHADOW E&M-EST. PATIENT-LVL IV: CPT | Mod: PBBFAC,,, | Performed by: NURSE PRACTITIONER

## 2022-10-24 PROCEDURE — 1100F PR PT FALLS ASSESS DOC 2+ FALLS/FALL W/INJURY/YR: ICD-10-PCS | Mod: CPTII,S$GLB,, | Performed by: NURSE PRACTITIONER

## 2022-10-24 PROCEDURE — 99204 PR OFFICE/OUTPT VISIT, NEW, LEVL IV, 45-59 MIN: ICD-10-PCS | Mod: S$GLB,,, | Performed by: NURSE PRACTITIONER

## 2022-10-24 PROCEDURE — 1125F AMNT PAIN NOTED PAIN PRSNT: CPT | Mod: CPTII,S$GLB,, | Performed by: NURSE PRACTITIONER

## 2022-10-24 NOTE — PROGRESS NOTES
Neurosurgery  History & Physical    SUBJECTIVE:     Chief Complaint: Cervical Stenosis    History of Present Illness: Demarco Means is a 67 y.o. male being seen in clinic today to discuss concerns with cervical radiculopathy and post-concussive symptoms since falling about 2 months ago. The patient fell at work landing backwards in a 4ft hole. Denies LOC, but recalls his vision being blurry and dizziness for a couple of seconds before he could be lifted out of the hole. Today, he reports continued neck pain that radiates down the R>L arm associated with tingling. Rates the pain as a 4/10. States that he has struggled with neck stiffness and tingling for several years; however, the fall exacerbated the pain. Aggravating factors include movement. Denies weakness, b/b dysfunction, saddle anesthesia, or gait instability.  He has obtained chiropractic adjustments in the past with significant relief.     Review of patient's allergies indicates:   Allergen Reactions    1,3-butylene glycol Shortness Of Breath    Methylprednisolone sod phos Swelling    Ciprofloxacin Nausea Only and Other (See Comments)    Levofloxacin Other (See Comments)    Sulfacetamide sodium Other (See Comments)       Current Outpatient Medications   Medication Sig Dispense Refill    meclizine (ANTIVERT) 25 mg tablet Take 1 tablet (25 mg total) by mouth 3 (three) times daily as needed. (Patient not taking: Reported on 10/24/2022) 15 tablet 0    sildenafil (VIAGRA) 100 MG tablet Take 1 tablet (100 mg total) by mouth daily as needed for Erectile Dysfunction. (Patient not taking: No sig reported) 10 tablet 2    triamcinolone acetonide 0.1% (KENALOG) 0.1 % cream Apply topically 2 (two) times daily. for 7 days (Patient not taking: Reported on 10/24/2022) 1 Tube 0     No current facility-administered medications for this visit.       Past Medical History:   Diagnosis Date    Agatston CAC score, >400 4/11/2016    813     Agatston CAC score, >400      Allergy     Back pain     BPH (benign prostatic hypertrophy)     Colon polyps     per indication from colonoscopy on 9.27.2006    Degenerative disc disease     GERD (gastroesophageal reflux disease)     Hyperlipidemia     Hypertension     Kidney stones     Meniere disease      Past Surgical History:   Procedure Laterality Date    BACK SURGERY      COLONOSCOPY N/A 4/7/2016    Procedure: COLONOSCOPY;  Surgeon: Esau Helms MD;  Location: [facility] ([address] [place] [address]);  Service: Endoscopy;  Laterality: N/A;  Last procedure by Scooter 4/6/2011    EYE SURGERY Bilateral     bilateral    SPINE SURGERY  2007    3 lumbar surgeries     Family History       Problem Relation (Age of Onset)    Anuerysm Brother    Cancer Maternal Uncle    Glaucoma Mother    Heart disease Father, Mother, Sister    Hypertension Father    Kidney disease Mother    No Known Problems Sister, Sister, Son    Parkinsonism Father          Social History     Socioeconomic History    Marital status:     Number of children: 1   Occupational History    Occupation:    Tobacco Use    Smoking status: Never    Smokeless tobacco: Never   Substance and Sexual Activity    Alcohol use: No     Alcohol/week: 0.0 standard drinks    Drug use: No    Sexual activity: Yes     Partners: Female       Review of Systems   Constitutional:  Negative for activity change, appetite change and fever.   HENT:  Negative for hearing loss and postnasal drip.    Eyes:  Negative for pain and visual disturbance.   Respiratory:  Negative for shortness of breath.    Cardiovascular:  Negative for chest pain and palpitations.   Gastrointestinal:  Negative for abdominal pain.   Endocrine: Negative for polydipsia, polyphagia and polyuria.   Musculoskeletal:  Positive for myalgias, neck pain and neck stiffness. Negative for back pain and gait problem.   Neurological:  Positive for numbness. Negative for dizziness, weakness and headaches.   Psychiatric/Behavioral:  Negative for confusion  "and dysphoric mood.      OBJECTIVE:     Vital Signs  Temp: 97.7 °F (36.5 °C)  Pulse: 83  BP: (!) 151/104  Pain Score:   4  Height: 5' 10" (177.8 cm)  Weight: 92.2 kg (203 lb 4.2 oz)  Body mass index is 29.17 kg/m².      Neurosurgery Physical Exam  General: well developed, well nourished, no distress.   Head: normocephalic, atraumatic  Neurologic: Alert and oriented. Thought content appropriate.  GCS: Motor: 6/Verbal: 5/Eyes: 4 GCS Total: 15  Mental Status: Awake, Alert, Oriented x 4  Language: No aphasia  Speech: No dysarthria  Cranial nerves: face symmetric, tongue midline, CN II-XII grossly intact.   Eyes: pupils equal, round, reactive to light with accomodation, EOMI.   Pulmonary: normal respirations, no signs of respiratory distress  Abdomen: soft, non-distended  Skin: Skin is warm, dry and intact.  Sensory: intact to light touch throughout  Motor Strength:Moves all extremities spontaneously with good tone.    Strength  Deltoids Triceps Biceps Wrist Extension Wrist Flexion Hand    Upper: R 5/5 5/5 5/5 5/5 5/5 5/5    L 5/5 5/5 5/5 5/5 5/5 5/5     HF KE KF DF PF EHL   Lower: R 5/5 5/5 5/5 5/5 5/5 5/5    L 5/5 5/5 5/5 5/5 5/5 5/5     Reflexes:   Gupta's: Negative     Cerebellar:   Gait stable, fluid.   Tandem Gait: No difficulty  Able to walk on heels & toes     Cervical:   ROM: Pain limited with flexion, extension, lateral rotation and ear-to-shoulder bend.   Midline TTP: Positive     Thoracic:  Midline TTP: Negative.    Diagnostic Results:  I have personally reviewed the MRI cervical spine 10/17/22. The imaging shows multilevel degenerative changes most pronounced C6/7 with severe central stenosis and neural foraminal narrowing. Varying levels of stenosis central and neural foraminal C3-6.     ASSESSMENT/PLAN:   Demarco Means is a 67 y.o. male seen in clinic today to discuss concerns with cervical radiculopathy and post-concussive symptoms since falling about 2 months ago. I have ordered a dynamic " x-ray for instability and CT cervical spine for surgical planning. The patient is apprehensive about surgery at this time. I have ordered PT in the interim to assist with his stiffness/aches. I would like the patient to follow-up in clinic with Dr. Juarez as scheduled to review the image findings and discuss next steps. I have encouraged him to contact the clinic with any questions, concerns, or adverse clinical changes. He verbalized understanding.      MARK Escamilla-GLENNA  Neurosurgery  [facility].      Note dictated with voice recognition software, please excuse any grammatical errors.

## 2022-10-25 ENCOUNTER — CLINICAL SUPPORT (OUTPATIENT)
Dept: REHABILITATION | Facility: HOSPITAL | Age: 67
End: 2022-10-25
Payer: MEDICARE

## 2022-10-25 DIAGNOSIS — M54.2 NECK PAIN: ICD-10-CM

## 2022-10-25 DIAGNOSIS — R29.3 ABNORMAL POSTURE: ICD-10-CM

## 2022-10-25 DIAGNOSIS — M48.02 CERVICAL SPINAL STENOSIS: Primary | ICD-10-CM

## 2022-10-25 DIAGNOSIS — R42 DIZZINESS AND GIDDINESS: ICD-10-CM

## 2022-10-25 PROCEDURE — 97161 PT EVAL LOW COMPLEX 20 MIN: CPT | Mod: PN

## 2022-10-25 NOTE — PLAN OF CARE
"[facility]   Physical Therapy Initial Evaluation     Date: 10/25/2022   Name: Demarco Means  Clinic Number: [id number]    Therapy Diagnosis:   Encounter Diagnoses   Name Primary?    Cervical spinal stenosis Yes    Abnormal posture     Neck pain      Physician: Carolyne Blakn NP    Physician Orders: PT Eval and Treat   Medical Diagnosis from Referral: cervical spinal stenosis  Evaluation Date: 10/25/2022  Authorization Period Expiration: 11/22/2022  Plan of Care Expiration: 12/20/2022   Progress Note Due: 11/15/2022  Visit # / Visits authorized: 1/ pending   FOTO: 1/3    Precautions: Standard     Time In: 8:45AM  Time Out: 9:30AM  Total Appointment Time (timed & untimed codes): 45 minutes      SUBJECTIVE     Date of onset: Chronic (2000)     History of current condition - Demarco reports: with reports of chronic neck pain and stiffness. He reports he has been in several accidents and sustained a fall at work making it worse. He reports that since the fall he has also been dizzy - the dizziness is not provoked by anything it hits him randomly and stays with him throughout the day. He reports constant numbness and tingling in bilateral upper extremity and reports it goes all the way down his fingers. He reports his right finger in the morning is bent and then "snaps". Denies issues with  or dropping objects.     He is set to have a dynamic xray and CT scan tomorrow - this is for investigation into dizziness causes.     He has never done physical therapy before for his neck pain and dizziness.     History of Present Illness: Demarco Means is a 67 y.o. male being seen in clinic today to discuss concerns with cervical radiculopathy and post-concussive symptoms since falling about 2 months ago. The patient fell at work landing backwards in a 4ft hole. Denies LOC, but recalls his vision being blurry and dizziness for a couple of seconds before he could be lifted out of " the hole. Today, he reports continued neck pain that radiates down the R>L arm associated with tingling. Rates the pain as a 4/10. States that he has struggled with neck stiffness and tingling for several years; however, the fall exacerbated the pain. Aggravating factors include movement. Denies weakness, b/b dysfunction, saddle anesthesia, or gait instability.  He has obtained chiropractic adjustments in the past with significant relief.     Falls: 1 fall     Imaging, MRI studies:   FINDINGS:  Structures of the posterior fossa are well maintained yielding normal signal intensity pattern.  No evidence of Chiari malformation.  Curvature of the cervical spine is well maintained without evidence of outstanding pathologic marrow signal.  No primary osseous lesion.  Atlanto dens interval is well maintained.  The pre and paravertebral soft tissues yield no significant finding.  Cord maintains normal caliber and uniform signal intensity.     At C2/C3, decreased hydration signal and mild diffuse annular bulging of the posterior disc contour the asymmetric towards the left.  Marginal effacement upon the thecal sac.  No evidence of cord compression.  Neural foramina spaces are patent.     At C3/C4, 5 mm disc osteophyte complex projecting beyond the posterior vertebral column left paracentral locale with the lateral edge encroaching upon the left-sided neural foraminal space.  Moderate spinal canal stenosis 6 mm wide.  Mild left-sided uncinate joint spur formation encroaching upon the left-sided neural foraminal space.     At C4/C5, 4 mm central disc protrusion projecting beyond the posterior vertebral column minimal effacement upon the anterior subarachnoid space and minimal cord contact.  Mild spinal canal stenosis.  Bilateral uncinate joint spur formation not resulting in any significant neural foraminal narrowing.  Minimal nerve root contact.     At C5/C6, shallow 2 mm disc osteophyte complex projecting beyond the  posterior vertebral column inducing mild moderate spinal canal stenosis 7 mm wide.  Mild anterior thecal sac effacement.  Bilateral uncinate joint spur formation.  Bilateral facet arthrosis dominant towards the left.     At C6/C7, large 5 mm central/left paramedian disc protrusion causing redundancy of CSF and loss of CSF signal circling the cervical cord.  Severe cord compression with the AP diameter changes of 3 mm.  Bilateral uncinate joint spur formation causing severe left-sided neural foraminal narrowing.  The cord yields no evidence of alteration in signal.     At C7/T1, no significant finding.    Prior Therapy: No  Social History:  lives with their spouse  Occupation:  /   Prior Level of Function: None  Current Level of Function: out of work and limited cervical mobility     Pain:  Current 4/10, worst 6/10, best 4/10   Location: bilateral neck    Description: tightness and stiffness  Aggravating Factors: cervical ROM  Easing Factors:  massage gun    Non-Mechanical Origin:  Night Pain?  No  Hx of Cancer?  No  Trauma?  No  Sudden bowel/bladder changes?  No  Bone Density compromise?  No    Patients goals: improve neck stiffness and pain. Improve dizziness to return to work     Medical History:   Past Medical History:   Diagnosis Date    Agatston CAC score, >400 4/11/2016    813     Agatston CAC score, >400     Allergy     Back pain     BPH (benign prostatic hypertrophy)     Colon polyps     per indication from colonoscopy on 9.27.2006    Degenerative disc disease     GERD (gastroesophageal reflux disease)     Hyperlipidemia     Hypertension     Kidney stones     Meniere disease        Surgical History:   Demarco Means  has a past surgical history that includes Back surgery; Colonoscopy (N/A, 4/7/2016); Eye surgery (Bilateral); and Spine surgery (2007).    Medications:   Demarco Moseley has a current medication list which includes the following prescription(s): meclizine, sildenafil, and  triamcinolone acetonide 0.1%.    Allergies:   Review of patient's allergies indicates:   Allergen Reactions    1,3-butylene glycol Shortness Of Breath    Methylprednisolone sod phos Swelling    Ciprofloxacin Nausea Only and Other (See Comments)    Levofloxacin Other (See Comments)    Sulfacetamide sodium Other (See Comments)        OBJECTIVE   OBSERVATIONS: Patient is a 67 year old male who ambulates with no AD and no apparent signs of distress.  No signs of atrophy at the SCM, traps, deltoids, supraspinatus.    POSTURE:  Patient is significant for moderate forward head and internally rotated shoulders. Upper t-spine flexion also observed.    Scapular Posture Kennard (measures hands on hips Inferior angle to t-spine > 1.5cm abnormal): R L  Anterior tipping YES   Winging YES   Superior YES     CERVICAL ACTIVE RANGE OF MOTION   Flexion 50 deg muscle pull   Extension 30 deg muscle pull   Right Side Bend 20 deg muscle pull   Left Side Bend 30 deg muscle pull   Right Rotation 45 deg muscle pull   Left Rotation 45 muscle pull   Craniocervical Angle 35 deg     SHOULDER ACTIVE RANGE OF MOTION    RIGHT LEFT   Flexion Within normal limits  Within normal limits    Scaption Within normal limits  Within normal limits    External Rotation Within normal limits  Within normal limits    Internal Rotation (Apley Scratch Test) Within normal limits  Within normal limits    Extension Within normal limits  Within normal limits      UPPER LOWER EXTREMITY STRENGTH    Left  Right    Shoulder Flexion 4+/5 4+/5   Shoulder Abduction 4+/5 4+/5   Shoulder External Rotation 4+/5 4+/5   Shoulder Internal Rotation 4+/5 4+/5   Elbow Flexion 4+/5 4+/5   Elbow Extension 4+/5 4+/5    Strength (right dominant)  58 pound average 45 pound average     PRONE SCAPULAR STRENGTH    RIGHT LEFT   Flexion 4-/5 4-/5   Abduction 4-/5 4-/5   Extension 4-/5 4-/5       SPECIAL TESTS   Vertbral Artery Screen Negative   Sharps Reji Test Negative   Alar Ligament  Test Negative   Transverse Ligament Test Negative   Sprulings Test -    Distraction Test +   Deep Neck Flexor Endurance Test Not Tested    ULNT Median Nerve Bias -    ULNT Ulnar Nerve Bias -    ULNT Radial Nerve Bias -      PALPATION:  Patient reports primary pain region along   (+) Upper Trapezius Left > Right with active referral into left arm  (+) Levator Scapulae  (+) Sub-Occipital  (+) Paraspainals  (+) 1st Rib Elevation on Left     Prone PA segmental testing:   Cervical Spine: Moderate hypomobility throughout with concordant symptoms C4-6  Thoracic Spine: Moderate hypomobility throughout with concordant symptoms T1-7    Passive IV Motion Testing:  OA (rotation with head nod): muscular end feel restricted 50%  AA (maximum flexion/rotation): muscle end feel restricted 75%  Mid Cervical (segmental sideglides): hypomobile right>L  Lower Cervical (segmental sideglides): hypomobile right>L    VESTIBULAR ASSESSMENT:   [place]-Hallpike    Left: negative no symptoms   Right: negative no symptoms  Roll Test:    Left: negative  no symptoms   Right: increased dizziness - possible geotropic nystagmus - patient with difficulty holding steady gaze  Side Lie Test:    Left: negative    Right: negative   Visual Tracking - saccadic and multiple corrective saccades - mild increase in dizziness  X1 Viewing: Slow with corrective saccades - mild increase in dizziness and neck pain    Limitation/Restriction for FOTO NDI Survey    Therapist reviewed FOTO scores for Demarco Sr Brownsukumar on 10/25/2022.   FOTO documents entered into [employer] - see Media section.    Limitation Score: 45%       TREATMENT     No treatment provided today due to extensive history and objective measurments.     PATIENT EDUCATION AND HOME EXERCISES     Education provided:   - Improving upright posture and being intentional about moving head and neck.  - Relationship of dizziness and neck pain/tightness.  - Pt/family was provided educational information, including:  role of PT, goals for PT, scheduling - pt verbalized understanding. Discussed insurance limitations with pt.     Written Home Exercises Provided: yes. Exercises were reviewed and Demarco was able to demonstrate them prior to the end of the session.  Demarco demonstrated fair  understanding of the education provided. See EMR under Patient Instructions for exercises provided during therapy sessions.    ASSESSMENT     Demarco Moseley is a 67 y.o. male referred to outpatient Physical Therapy with a medical diagnosis of cervical spinal stenosis. Patient presents with significant cervical ROM restrictions and tenderness to palpation in the upper trapezius, suboccipitals, cervical paraspinals. Muscular end feel with all passive cervical ROM. Significant radicular symptoms with palpation to left upper trapezius.  strength globally weak bilaterally for nature of his work - however SIGNIFICANT 15 pound difference right>left. Monynet is right hand dominant. In addition, patient presents with increased dizziness - possible Horizontal canal BPPV noted however difficulty discerning due to wandering eyes. Noted vestibular weakness and increase in symptoms with gaze stabilization and x1 viewing. Patient is diagnosed with Meniere's and states he has suffered from BPPV prior.     Medical necessity is demonstrated by the following IMPAIRMENTS/PROBLEMS:  -Decreased function (NDI)   -Increased pain (NPS)   -Decreased pain free cervical AROM   -Decreased UE strength (MMT)   -Impaired sitting posture (Craniocervical angle)  -Decreased participation in regular exercise routine  - (+) TTP: upper trapezius (left > right), suboccipitals, Cervical paraspinals    Intervention strategies designed to address these impairments will be instrumental in achieving the stated functional goals, including her ability to safely perform mobility tasks. Based on the examination, the patient's rehabilitation potential to achieve functional goals is  good.    Patient prognosis is Good.   Patient will benefit from skilled outpatient Physical Therapy to address the deficits stated above and in the chart below, provide patient /family education, and to maximize patientt's level of independence.     Plan of care discussed with patient: Yes  Patient's spiritual, cultural and educational needs considered and patient is agreeable to the plan of care and goals as stated below:     Anticipated Barriers for therapy: none    Medical Necessity is demonstrated by the following  History  Co-morbidities and personal factors that may impact the plan of care Co-morbidities:   high BMI and HTN    Personal Factors:   no deficits     moderate   Examination  Body Structures and Functions, activity limitations and participation restrictions that may impact the plan of care Body Regions:   head  neck  upper extremities    Body Systems:    gross symmetry  ROM  strength  gross coordinated movement  balance  gait  transfers  transitions    Participation Restrictions:   Patient can not participate in pain free community activities.     Activity limitations:   Learning and applying knowledge  no deficits    General Tasks and Commands  no deficits    Communication  no deficits    Mobility  no deficits    Self care  no deficits         moderate   Clinical Presentation stable and uncomplicated low   Decision Making/ Complexity Score: low     Goals:  Short Term Goals: 4 weeks   1. Patient demonstrates independence with HEP.   2. Patient demonstrates independence with Postural Awareness.   3. Patient will report cessation of constant upper extremity numbness and tingling to only complaints of intermittent.  4. Patient will improve cervical rotation to >55 degs    Long Term Goals: 8 weeks   1. Patient will report cessation of dizziness with driving and functional activities   2. Patient demonstrates increased  strength in right upper extremity to >60 pounds.  3. Patient demonstrates improved  overall function per NDI to 37% or less.   4. Patient will gloablly improve cervical ROM to >10-15% from initial evaluation.     PLAN   Plan of care Certification: 10/25/2022 to 12/20/2022 .    Outpatient Physical Therapy 2 times weekly for 8 weeks to include the following interventions: Cervical/Lumbar Traction, Electrical Stimulation  , Manual Therapy, Moist Heat/ Ice, Neuromuscular Re-ed, Patient Education, Self Care, Therapeutic Activities, and Therapeutic Exercise.     Nova England, PT    Pt may be seen by PTA as part of the rehabilitation team.     Therapist: Nova England, PT  10/25/2022    I CERTIFY THE NEED FOR THESE SERVICES FURNISHED UNDER THIS PLAN OF TREATMENT AND WHILE UNDER MY CARE   Physician's comments:     Physician's Signature: ___________________________________________________

## 2022-10-26 ENCOUNTER — HOSPITAL ENCOUNTER (OUTPATIENT)
Dept: RADIOLOGY | Facility: HOSPITAL | Age: 67
Discharge: HOME | End: 2022-10-26
Attending: INTERNAL MEDICINE
Payer: MEDICARE

## 2022-10-26 DIAGNOSIS — R42 DIZZINESS AND GIDDINESS: ICD-10-CM

## 2022-10-26 PROCEDURE — 70544 MR ANGIOGRAPHY HEAD W/O DYE: CPT | Mod: TC

## 2022-10-26 PROCEDURE — 70547 MR ANGIOGRAPHY NECK W/O DYE: CPT | Mod: 26,,, | Performed by: RADIOLOGY

## 2022-10-26 PROCEDURE — 70547 MR ANGIOGRAPHY NECK W/O DYE: CPT | Mod: TC

## 2022-10-26 PROCEDURE — 70544 MRA BRAIN WITHOUT CONTRAST: ICD-10-PCS | Mod: 26,,, | Performed by: RADIOLOGY

## 2022-10-26 PROCEDURE — 70544 MR ANGIOGRAPHY HEAD W/O DYE: CPT | Mod: 26,,, | Performed by: RADIOLOGY

## 2022-10-26 PROCEDURE — 70547 MRA NECK WITHOUT CONTRAST: ICD-10-PCS | Mod: 26,,, | Performed by: RADIOLOGY

## 2022-10-27 ENCOUNTER — CLINICAL SUPPORT (OUTPATIENT)
Dept: REHABILITATION | Facility: HOSPITAL | Age: 67
End: 2022-10-27
Payer: MEDICARE

## 2022-10-27 DIAGNOSIS — M48.02 CERVICAL SPINAL STENOSIS: Primary | ICD-10-CM

## 2022-10-27 DIAGNOSIS — M54.2 NECK PAIN: ICD-10-CM

## 2022-10-27 DIAGNOSIS — R29.3 ABNORMAL POSTURE: ICD-10-CM

## 2022-10-27 DIAGNOSIS — R42 DIZZINESS AND GIDDINESS: ICD-10-CM

## 2022-10-27 PROCEDURE — 97140 MANUAL THERAPY 1/> REGIONS: CPT | Mod: PN

## 2022-10-27 PROCEDURE — 97110 THERAPEUTIC EXERCISES: CPT | Mod: PN

## 2022-10-27 PROCEDURE — 97112 NEUROMUSCULAR REEDUCATION: CPT | Mod: PN

## 2022-10-27 NOTE — PROGRESS NOTES
[facility]   Physical Therapy Treatment Note     Name: Demarco Moseley [facility] Number: [id number]    Therapy Diagnosis: No diagnosis found.  Physician: Carolyne Blank NP    Visit Date: 10/27/2022    Physician Orders: PT Eval and Treat   Medical Diagnosis from Referral: cervical spinal stenosis  Evaluation Date: 10/25/2022  Authorization Period Expiration: 11/22/2022  Plan of Care Expiration: 12/20/2022   Progress Note Due: 11/15/2022  Visit # / Visits authorized: 1/ pending + evaluation  FOTO: 1/3     Precautions: Standard     PTA Visit #: 0/5     Time In: 8:00AM  Time Out: 8:45AM  Total Billable Time: 45 minutes    SUBJECTIVE     Pt reports: he was dizzy after his initial evaluation after messing with his neck. He has started stretching more at home.  He was compliant with home exercise program.  Response to previous treatment: increased dizziness  Functional change: none    Pain: 6/10  Location: left head  and neck      OBJECTIVE     Objective Measures updated at progress report unless specified.     Treatment     Demarco received the treatments listed below:      therapeutic exercises to develop strength, endurance, ROM, flexibility, posture, and core stabilization for 15 minutes including:  Supine Chin Tuck 2x10x5 seconds  Seated Scapular Retractions 2x10  Upper trapezius Stretch 3x30s   Levator scapulae Stretch 3x30s     manual therapy techniques: Joint mobilizations, Manual traction, Myofacial release, Soft tissue Mobilization, and Friction Massage were applied to the: cervical spine for 10 minutes, including:  Soft-tisue mobilization upper trapezius, suboccipitals, levator scapulae, carevical paraspinals   in Supine C1-4  Manual upper trapezius stretching   Manual Cervical Traction    neuromuscular re-education activities to improve: Balance, Coordination, Proprioception, and Posture for 15 minutes. The following activities were included:  X1 viewing 3x1 minute seated    Narrow Base of Support Balance (Feet Together)   Left and Right Head Turns x1 minute   Up and Down Head Turns x1 minute    Eyes closed x1 minute    Patient Education and Home Exercises     Home Exercises Provided and Patient Education Provided     Education provided:   - home exercise program provided  - education about appropriate levels of     Written Home Exercises Provided: yes. Exercises were reviewed and Demarco was able to demonstrate them prior to the end of the session.  Demarco demonstrated good  understanding of the education provided. See EMR under Patient Instructions for exercises provided during therapy sessions    ASSESSMENT     Initiated home exercise program program with cervical strengthening and ROM. Patient did express increased dizziness following initial evaluation. Educated patient to track these triggers as this is useful for therapeutic intervention. Instructed in vestibular exercises. Educated that dizziness provocation is okay as long as the symptoms settle quickly after completion of program.    Demarco Is progressing well towards his goals.   Pt prognosis is Good.     Pt will continue to benefit from skilled outpatient physical therapy to address the deficits listed in the problem list box on initial evaluation, provide pt/family education and to maximize pt's level of independence in the home and community environment.     Pt's spiritual, cultural and educational needs considered and pt agreeable to plan of care and goals.     Anticipated barriers to physical therapy: None    Goals:   Short Term Goals: 4 weeks   1. Patient demonstrates independence with HEP.   2. Patient demonstrates independence with Postural Awareness.   3. Patient will report cessation of constant upper extremity numbness and tingling to only complaints of intermittent.  4. Patient will improve cervical rotation to >55 degs     Long Term Goals: 8 weeks   1. Patient will report cessation of dizziness with  driving and functional activities   2. Patient demonstrates increased  strength in right upper extremity to >60 pounds.  3. Patient demonstrates improved overall function per NDI to 37% or less.   4. Patient will gloablly improve cervical ROM to >10-15% from initial evaluation.   PLAN     Plan of care Certification: 10/25/2022 to 12/20/2022 .     Outpatient Physical Therapy 2 times weekly for 8 weeks to include the following interventions: Cervical/Lumbar Traction, Electrical Stimulation  , Manual Therapy, Moist Heat/ Ice, Neuromuscular Re-ed, Patient Education, Self Care, Therapeutic Activities, and Therapeutic Exercise.      Nova [place], PT     Pt may be seen by PTA as part of the rehabilitation team.     Nova [place], PT

## 2022-10-31 ENCOUNTER — CLINICAL SUPPORT (OUTPATIENT)
Dept: REHABILITATION | Facility: HOSPITAL | Age: 67
End: 2022-10-31
Payer: MEDICARE

## 2022-10-31 DIAGNOSIS — R29.3 ABNORMAL POSTURE: ICD-10-CM

## 2022-10-31 DIAGNOSIS — M48.02 CERVICAL SPINAL STENOSIS: Primary | ICD-10-CM

## 2022-10-31 DIAGNOSIS — M54.2 NECK PAIN: ICD-10-CM

## 2022-10-31 DIAGNOSIS — R42 DIZZINESS AND GIDDINESS: ICD-10-CM

## 2022-10-31 PROCEDURE — 97110 THERAPEUTIC EXERCISES: CPT | Mod: PN,CQ

## 2022-10-31 PROCEDURE — 97112 NEUROMUSCULAR REEDUCATION: CPT | Mod: PN,CQ

## 2022-10-31 PROCEDURE — 97140 MANUAL THERAPY 1/> REGIONS: CPT | Mod: PN,CQ

## 2022-10-31 NOTE — PROGRESS NOTES
[facility]   Physical Therapy Treatment Note     Name: Demarco Moseley [facility]  Clinic Number: [id number]    Therapy Diagnosis:   Encounter Diagnoses   Name Primary?    Cervical spinal stenosis Yes    Abnormal posture     Dizziness and giddiness     Neck pain      Physician: Carolyne Blank NP    Visit Date: 10/31/2022    Physician Orders: PT Eval and Treat   Medical Diagnosis from Referral: cervical spinal stenosis  Evaluation Date: 10/25/2022  Authorization Period Expiration: 11/22/2022  Plan of Care Expiration: 12/20/2022   Progress Note Due: 11/15/2022  Visit # / Visits authorized: 2/11  FOTO: 1/3     Precautions: Standard     PTA Visit #: 1/5     Time In: 1:45pm  Time Out: 2:23pm  Total Billable Time: 38 minutes    SUBJECTIVE     Pt reports: he finds his neck is getting better since the care of physical therapy, however, it is still stiff. He has been doing his exercises at home.     He was compliant with home exercise program.  Response to previous treatment: increased dizziness  Functional change: none    Pain: 0/10 at rest   Location: left head and neck      OBJECTIVE     Objective Measures updated at progress report unless specified.     Treatment     Demarco received the treatments listed below:      therapeutic exercises to develop strength, endurance, ROM, flexibility, posture, and core stabilization for 15 minutes including:   Cervical AROM in rotation to Left and Right, 3x each way hold for 5 seconds  Cervical AROM in flexion and extension, 3x hold for 5 seconds   Supine Chin Tuck 2x10x5 seconds  Seated Scapular Retractions 2x10  Shoulder reverse rolls, 2x10  Upper trapezius Stretch 3x30s   Levator scapulae Stretch 3x30s       manual therapy techniques: Joint mobilizations, Manual traction, Myofacial release, Soft tissue Mobilization, and Friction Massage were applied to the: cervical spine for 8 minutes, including:    Soft-tisue mobilization upper trapezius, suboccipitals,  levator scapulae, carevical paraspinals    Not performed:    in Supine C1-4  Manual upper trapezius stretching   Manual Cervical Traction    neuromuscular re-education activities to improve: Balance, Coordination, Proprioception, and Posture for 15 minutes. The following activities were included:    Not performed:   X1 viewing 3x1 minute seated   Narrow Base of Support Balance (Feet Together)   Left and Right Head Turns x1 minute   Up and Down Head Turns x1 minute    Eyes closed x1 minute    Vestibular exercises: refer to attached handout for review     Oculomotor: Smooth pursuits    Oculomotor: Saccades  Gaze stabilization: Sitting   Gaze stabilization: Standing feet apart   Visuo-vestibular: Head/eyes moving in same direction   Visuo-vestibular: Head/eyes moving in opposite direction       Patient Education and Home Exercises     Home Exercises Provided and Patient Education Provided     Education provided:   - home exercise program provided  - education about appropriate levels of     Written Home Exercises Provided: yes. Exercises were reviewed and Demarco was able to demonstrate them prior to the end of the session.  Demarco demonstrated good  understanding of the education provided. See EMR under Patient Instructions for exercises provided during therapy sessions    ASSESSMENT     Patient arrives to therapy clinic with no major neck pain today, just stiffness. Patient also reports that he still has dizziness, however it's not as bad compared to initial visit with physical therapist. Therefore, continued with manual therapy to reduce neck muscles stiffness and to improve cervical spine range of motions. Introduced more vestibular exercises to reduce further episodes of dizziness. Patient appears to showed no signs of dizziness or complaints of neck pain throughout session today. Will continue to work towards pt treatment goals.     Demarco Is progressing well towards his goals.   Pt prognosis is Good.      Pt will continue to benefit from skilled outpatient physical therapy to address the deficits listed in the problem list box on initial evaluation, provide pt/family education and to maximize pt's level of independence in the home and community environment.     Pt's spiritual, cultural and educational needs considered and pt agreeable to plan of care and goals.     Anticipated barriers to physical therapy: None    Goals:   Short Term Goals: 4 weeks   1. Patient demonstrates independence with HEP.   2. Patient demonstrates independence with Postural Awareness.   3. Patient will report cessation of constant upper extremity numbness and tingling to only complaints of intermittent.  4. Patient will improve cervical rotation to >55 degs     Long Term Goals: 8 weeks   1. Patient will report cessation of dizziness with driving and functional activities   2. Patient demonstrates increased  strength in right upper extremity to >60 pounds.  3. Patient demonstrates improved overall function per NDI to 37% or less.   4. Patient will gloablly improve cervical ROM to >10-15% from initial evaluation.   PLAN     Plan of care Certification: 10/25/2022 to 12/20/2022 .     Outpatient Physical Therapy 2 times weekly for 8 weeks to include the following interventions: Cervical/Lumbar Traction, Electrical Stimulation  , Manual Therapy, Moist Heat/ Ice, Neuromuscular Re-ed, Patient Education, Self Care, Therapeutic Activities, and Therapeutic Exercise.      Nova [place], PT     Pt may be seen by PTA as part of the rehabilitation team.     Ana Fitzpatrick PTA

## 2022-11-01 NOTE — PROGRESS NOTES
MEDICAL HISTORY:  Hypertension.  Hyperlipidemia.  prediabetes  BPH.  Lumbar degenerative disc disease with previous back surgeries.  Meniere's disease.  Gastroesophageal reflux disease.  Nephrolithiasis.  Prostatitis.  Adenomatous colon polyp in .  Chronic kidney disease, stage II-IIIa     SOCIAL HISTORY:  Tobacco and alcohol use -- none.      FAMILY HISTORY:  Father is , hypertension, Parkinson's disease.  Mother   is , heart disease.  Three sisters, all living, one sister with heart   disease.  Two brothers , one from an aneurysm, one from drug use.     SCREENING:  Colonoscopy in 2016, hyperplastic polyp.    Component      Latest Ref Rng & Units 10/26/2022           9:05 AM   WBC      3.90 - 12.70 K/uL 5.33   RBC      4.60 - 6.20 M/uL 5.00   Hemoglobin      14.0 - 18.0 g/dL 14.7   Hematocrit      40.0 - 54.0 % 45.1   MCV      82 - 98 fL 90   MCH      27.0 - 31.0 pg 29.4   MCHC      32.0 - 36.0 g/dL 32.6   RDW      11.5 - 14.5 % 12.2   Platelets      150 - 450 K/uL 206   MPV      9.2 - 12.9 fL 9.5   Immature Granulocytes      0.0 - 0.5 % 0.4   Gran # (ANC)      1.8 - 7.7 K/uL 2.1   Immature Grans (Abs)      0.00 - 0.04 K/uL 0.02   Lymph #      1.0 - 4.8 K/uL 2.5   Mono #      0.3 - 1.0 K/uL 0.5   Eos #      0.0 - 0.5 K/uL 0.2   Baso #      0.00 - 0.20 K/uL 0.04   nRBC      0 /100 WBC 0   Gran %      38.0 - 73.0 % 38.9   Lymph %      18.0 - 48.0 % 47.5   Mono %      4.0 - 15.0 % 9.2   Eosinophil %      0.0 - 8.0 % 3.2   Basophil %      0.0 - 1.9 % 0.8   Differential Method       Automated   Sodium      136 - 145 mmol/L 139   Potassium      3.5 - 5.1 mmol/L 4.1   Chloride      95 - 110 mmol/L 102   CO2      23 - 29 mmol/L 28   Glucose      70 - 110 mg/dL 93   BUN      8 - 23 mg/dL 14   Creatinine      0.5 - 1.4 mg/dL 1.2   Calcium      8.7 - 10.5 mg/dL 9.7   PROTEIN TOTAL      6.0 - 8.4 g/dL    Albumin      3.5 - 5.2 g/dL    BILIRUBIN TOTAL      0.1 - 1.0 mg/dL    Alkaline  Phosphatase      55 - 135 U/L    AST      10 - 40 U/L    ALT      10 - 44 U/L    Anion Gap      8 - 16 mmol/L 9   eGFR      >60 mL/min/1.73 m:2 >60   Cholesterol      120 - 199 mg/dL 214 (H)   Triglycerides      30 - 150 mg/dL 199 (H)   HDL      40 - 75 mg/dL 33 (L)   LDL Cholesterol External      63.0 - 159.0 mg/dL 141.2   HDL/Cholesterol Ratio      20.0 - 50.0 % 15.4 (L)   Total Cholesterol/HDL Ratio      2.0 - 5.0 6.5 (H)   Non-HDL Cholesterol      mg/dL 181   Hemoglobin A1C External      4.0 - 5.6 % 5.4   Estimated Avg Glucose      68 - 131 mg/dL 108   PSA, Screen      0.00 - 4.00 ng/mL 0.78   TSH      0.400 - 4.000 uIU/mL 1.068       67-year-old male   Presents for an annual routine visit    In regard to his recent visit ,  which after fall he was having headaches and vertigo.  MRI cervical spine show significant spinal stenosis which he was referred to neuro surgery.  He is on be scheduled to have a CT of the cervical spine and cervical radiographs and follow-up with neuro surgery again he has neck pain neck can extend to the left shoulder occasional right shoulder.  He also reports numbness involving his hands and the minutes hand  but this is chronic.  He also has numbness involving his legs and feet but he feels this is related to his history of lumbar spondylosis and previous surgeries.    In regard to dizziness he will still get about of vertigo where feels like it has been in but is  less often now and will happen very transiently.    He recently had MRA of the neck and brain for which there has been no vascular abnormalities involving the anterior posterior cerebral circulation      Review of symptoms   Reports no chest pain except occasional indigestion lower chest after eating.  No difficulty breathing, no abdominal pain.  Regular bowel function but can tend have constipation for which he will take Dulcolax.  No difficulty urinating.  No regurgitation    Labs noted above    Examination   Weight  208 lb   Pulse 68   Blood pressure 138/80   HEENT exam no abnormal findings   Neck no thyromegaly no masses   Chest clear breath sounds   Heart regular rate rhythm   Abdominal exam soft nontender no hepatosplenomegaly abdominal masses  Pulses 2+ carotid pulses no bruits, 2+ pedal pulses   Extremities no edema   Bilateral Tinel sign   Hand  is diminished    Impression   General examination  Hyperlipidemia  Cervical spinal stenosis   Lumbar spondylosis  Paresthesia of the upper extremity, hand probably related carpal tunnel but could be related to cervical spondylosis  Episodic GERD   History of colon polyps  History of pre diabetes, current hemoglobin A1c is improved    Plan   EMG of the upper extremities   Screening colonoscopy order was put in      Regarding medications in the past he was on medications for blood pressure and hyperlipidemia.  He had diffuse muscle and joint aches with the use of statin drugs.  In regard to the blood pressure he has always deferred or resistant to be on medication and try to control things with diet physical activity.

## 2022-11-02 ENCOUNTER — CLINICAL SUPPORT (OUTPATIENT)
Dept: REHABILITATION | Facility: HOSPITAL | Age: 67
End: 2022-11-02
Payer: MEDICARE

## 2022-11-02 ENCOUNTER — OFFICE VISIT (OUTPATIENT)
Dept: INTERNAL MEDICINE | Facility: CLINIC | Age: 67
End: 2022-11-02
Payer: MEDICARE

## 2022-11-02 VITALS
DIASTOLIC BLOOD PRESSURE: 80 MMHG | SYSTOLIC BLOOD PRESSURE: 138 MMHG | HEART RATE: 72 BPM | WEIGHT: 209 LBS | BODY MASS INDEX: 29.92 KG/M2 | HEIGHT: 70 IN | OXYGEN SATURATION: 99 %

## 2022-11-02 DIAGNOSIS — M54.2 NECK PAIN: ICD-10-CM

## 2022-11-02 DIAGNOSIS — E78.5 HYPERLIPIDEMIA, UNSPECIFIED HYPERLIPIDEMIA TYPE: ICD-10-CM

## 2022-11-02 DIAGNOSIS — M48.02 CERVICAL SPINAL STENOSIS: ICD-10-CM

## 2022-11-02 DIAGNOSIS — R93.1 AGATSTON CORONARY ARTERY CALCIUM SCORE GREATER THAN 400: ICD-10-CM

## 2022-11-02 DIAGNOSIS — Z00.00 ANNUAL PHYSICAL EXAM: Primary | ICD-10-CM

## 2022-11-02 DIAGNOSIS — M47.816 LUMBAR SPONDYLOSIS: ICD-10-CM

## 2022-11-02 DIAGNOSIS — R29.3 ABNORMAL POSTURE: ICD-10-CM

## 2022-11-02 DIAGNOSIS — R42 DIZZINESS AND GIDDINESS: ICD-10-CM

## 2022-11-02 DIAGNOSIS — K21.9 GASTROESOPHAGEAL REFLUX DISEASE WITHOUT ESOPHAGITIS: ICD-10-CM

## 2022-11-02 DIAGNOSIS — K63.5 POLYP OF COLON, UNSPECIFIED PART OF COLON, UNSPECIFIED TYPE: ICD-10-CM

## 2022-11-02 DIAGNOSIS — Z12.11 COLON CANCER SCREENING: ICD-10-CM

## 2022-11-02 DIAGNOSIS — M48.02 CERVICAL SPINAL STENOSIS: Primary | ICD-10-CM

## 2022-11-02 DIAGNOSIS — R20.2 PARESTHESIA OF UPPER EXTREMITY: ICD-10-CM

## 2022-11-02 PROCEDURE — 1125F PR PAIN SEVERITY QUANTIFIED, PAIN PRESENT: ICD-10-PCS | Mod: CPTII,S$GLB,, | Performed by: INTERNAL MEDICINE

## 2022-11-02 PROCEDURE — 3288F FALL RISK ASSESSMENT DOCD: CPT | Mod: CPTII,S$GLB,, | Performed by: INTERNAL MEDICINE

## 2022-11-02 PROCEDURE — 3008F PR BODY MASS INDEX (BMI) DOCUMENTED: ICD-10-PCS | Mod: CPTII,S$GLB,, | Performed by: INTERNAL MEDICINE

## 2022-11-02 PROCEDURE — 3079F DIAST BP 80-89 MM HG: CPT | Mod: CPTII,S$GLB,, | Performed by: INTERNAL MEDICINE

## 2022-11-02 PROCEDURE — 99999 PR PBB SHADOW E&M-EST. PATIENT-LVL IV: CPT | Mod: PBBFAC,,, | Performed by: INTERNAL MEDICINE

## 2022-11-02 PROCEDURE — 1125F AMNT PAIN NOTED PAIN PRSNT: CPT | Mod: CPTII,S$GLB,, | Performed by: INTERNAL MEDICINE

## 2022-11-02 PROCEDURE — 1160F RVW MEDS BY RX/DR IN RCRD: CPT | Mod: CPTII,S$GLB,, | Performed by: INTERNAL MEDICINE

## 2022-11-02 PROCEDURE — 99214 PR OFFICE/OUTPT VISIT, EST, LEVL IV, 30-39 MIN: ICD-10-PCS | Mod: S$GLB,,, | Performed by: INTERNAL MEDICINE

## 2022-11-02 PROCEDURE — 4010F ACE/ARB THERAPY RXD/TAKEN: CPT | Mod: CPTII,S$GLB,, | Performed by: INTERNAL MEDICINE

## 2022-11-02 PROCEDURE — 3079F PR MOST RECENT DIASTOLIC BLOOD PRESSURE 80-89 MM HG: ICD-10-PCS | Mod: CPTII,S$GLB,, | Performed by: INTERNAL MEDICINE

## 2022-11-02 PROCEDURE — 1101F PR PT FALLS ASSESS DOC 0-1 FALLS W/OUT INJ PAST YR: ICD-10-PCS | Mod: CPTII,S$GLB,, | Performed by: INTERNAL MEDICINE

## 2022-11-02 PROCEDURE — 3075F PR MOST RECENT SYSTOLIC BLOOD PRESS GE 130-139MM HG: ICD-10-PCS | Mod: CPTII,S$GLB,, | Performed by: INTERNAL MEDICINE

## 2022-11-02 PROCEDURE — 1101F PT FALLS ASSESS-DOCD LE1/YR: CPT | Mod: CPTII,S$GLB,, | Performed by: INTERNAL MEDICINE

## 2022-11-02 PROCEDURE — 3044F HG A1C LEVEL LT 7.0%: CPT | Mod: CPTII,S$GLB,, | Performed by: INTERNAL MEDICINE

## 2022-11-02 PROCEDURE — 4010F PR ACE/ARB THEARPY RXD/TAKEN: ICD-10-PCS | Mod: CPTII,S$GLB,, | Performed by: INTERNAL MEDICINE

## 2022-11-02 PROCEDURE — 3288F PR FALLS RISK ASSESSMENT DOCUMENTED: ICD-10-PCS | Mod: CPTII,S$GLB,, | Performed by: INTERNAL MEDICINE

## 2022-11-02 PROCEDURE — 1159F PR MEDICATION LIST DOCUMENTED IN MEDICAL RECORD: ICD-10-PCS | Mod: CPTII,S$GLB,, | Performed by: INTERNAL MEDICINE

## 2022-11-02 PROCEDURE — 97110 THERAPEUTIC EXERCISES: CPT | Mod: PN,CQ

## 2022-11-02 PROCEDURE — 3008F BODY MASS INDEX DOCD: CPT | Mod: CPTII,S$GLB,, | Performed by: INTERNAL MEDICINE

## 2022-11-02 PROCEDURE — 99214 OFFICE O/P EST MOD 30 MIN: CPT | Mod: S$GLB,,, | Performed by: INTERNAL MEDICINE

## 2022-11-02 PROCEDURE — 1160F PR REVIEW ALL MEDS BY PRESCRIBER/CLIN PHARMACIST DOCUMENTED: ICD-10-PCS | Mod: CPTII,S$GLB,, | Performed by: INTERNAL MEDICINE

## 2022-11-02 PROCEDURE — 3044F PR MOST RECENT HEMOGLOBIN A1C LEVEL <7.0%: ICD-10-PCS | Mod: CPTII,S$GLB,, | Performed by: INTERNAL MEDICINE

## 2022-11-02 PROCEDURE — 99999 PR PBB SHADOW E&M-EST. PATIENT-LVL IV: ICD-10-PCS | Mod: PBBFAC,,, | Performed by: INTERNAL MEDICINE

## 2022-11-02 PROCEDURE — 97112 NEUROMUSCULAR REEDUCATION: CPT | Mod: PN,CQ

## 2022-11-02 PROCEDURE — 3075F SYST BP GE 130 - 139MM HG: CPT | Mod: CPTII,S$GLB,, | Performed by: INTERNAL MEDICINE

## 2022-11-02 PROCEDURE — 1159F MED LIST DOCD IN RCRD: CPT | Mod: CPTII,S$GLB,, | Performed by: INTERNAL MEDICINE

## 2022-11-02 NOTE — PROGRESS NOTES
[facility]   Physical Therapy Treatment Note     Name: Demarco Moseley [facility]  Clinic Number: [id number]    Therapy Diagnosis:   Encounter Diagnoses   Name Primary?    Cervical spinal stenosis Yes    Abnormal posture     Dizziness and giddiness     Neck pain      Physician: Carolyne Blank NP    Visit Date: 11/2/2022    Physician Orders: PT Eval and Treat   Medical Diagnosis from Referral: cervical spinal stenosis  Evaluation Date: 10/25/2022  Authorization Period Expiration: 11/22/2022  Plan of Care Expiration: 12/20/2022   Progress Note Due: 11/15/2022  Visit # / Visits authorized: 3/11  FOTO: 1/3     Precautions: Standard     PTA Visit #: 2/5     Time In: 2:30pm  Time Out: 315pm  Total Billable Time: 45 minutes    SUBJECTIVE     Pt reports: he felt sore after last therapy visit and that he got dizziness after a few hours later. However, patient states his dizziness comes and goes and it does not last longer, just a few seconds.      He was compliant with home exercise program.  Response to previous treatment: increased dizziness  Functional change: neck pain, limited neck ROM, occasional episodes of dizziness    Pain: 4/10 at rest   Location: left head and neck      OBJECTIVE     Objective Measures updated at progress report unless specified.     Treatment     Demarco received the treatments listed below:      therapeutic exercises to develop strength, endurance, ROM, flexibility, posture, and core stabilization for 30 minutes including:   Cervical AROM in rotation to Left and Right, 3x each way hold for 5 seconds  Cervical AROM in flexion and extension, 3x hold for 5 seconds   Supine Chin Tuck 2x10x5 seconds  Seated Scapular Retractions 2x10  Shoulder reverse rolls, 2x10  Upper trapezius Stretch 3x30s   Levator scapulae Stretch 3x30s   Standing shoulder rows with GTB, 2x10  Standing lats pull down with GTB, 2x10  Standing cable columns lats pull down with 10lb each arms,  2x10      manual therapy techniques: Joint mobilizations, Manual traction, Myofacial release, Soft tissue Mobilization, and Friction Massage were applied to the: cervical spine for 0 minutes, including:    Not performed:    Soft-tisue mobilization upper trapezius, suboccipitals, levator scapulae, carevical paraspinals   in Supine C1-4  Manual upper trapezius stretching   Manual Cervical Traction    neuromuscular re-education activities to improve: Balance, Coordination, Proprioception, and Posture for 15 minutes. The following activities were included:    Ambulate with head looking in horizontal direction  Ambulate with head looking in vertical direction  Visual tracking with cones on treadmill at speed 0.5mph      Not performed:   X1 viewing 3x1 minute seated   Narrow Base of Support Balance (Feet Together)   Left and Right Head Turns x1 minute   Up and Down Head Turns x1 minute    Eyes closed x1 minute    Vestibular exercises: refer to attached handout for review (deferred below today)     Oculomotor: Smooth pursuits    Oculomotor: Saccades  Gaze stabilization: Sitting   Gaze stabilization: Standing feet apart   Visuo-vestibular: Head/eyes moving in same direction   Visuo-vestibular: Head/eyes moving in opposite direction       Patient Education and Home Exercises     Home Exercises Provided and Patient Education Provided     Education provided:   - home exercise program provided  - education about appropriate levels of     Written Home Exercises Provided: yes. Exercises were reviewed and Demarco was able to demonstrate them prior to the end of the session.  Demarco demonstrated good  understanding of the education provided. See EMR under Patient Instructions for exercises provided during therapy sessions    ASSESSMENT     Noted patient demonstrates moderate lateral deviation in gait with head moving in horizontal/vertical directions as well as balance issues. Patient did report of one episodes of dizziness  during session, however, the symptoms quickly subsided. Patient still remains fairly stiff in all neck ROM. Worked on postural awareness and exercises to improve his posture and to prevent further neck pain. I advised patient to trial use a tennis ball or a slightly bigger ball than a tennis ball to perform a self cervical massage for decrease pain. Patient voiced that he understands what therapist recommends and will try at home.     Demarco Is progressing well towards his goals.   Pt prognosis is Good.     Pt will continue to benefit from skilled outpatient physical therapy to address the deficits listed in the problem list box on initial evaluation, provide pt/family education and to maximize pt's level of independence in the home and community environment.     Pt's spiritual, cultural and educational needs considered and pt agreeable to plan of care and goals.     Anticipated barriers to physical therapy: None    Goals:   Short Term Goals: 4 weeks   1. Patient demonstrates independence with HEP.   2. Patient demonstrates independence with Postural Awareness.   3. Patient will report cessation of constant upper extremity numbness and tingling to only complaints of intermittent.  4. Patient will improve cervical rotation to >55 degs     Long Term Goals: 8 weeks   1. Patient will report cessation of dizziness with driving and functional activities   2. Patient demonstrates increased  strength in right upper extremity to >60 pounds.  3. Patient demonstrates improved overall function per NDI to 37% or less.   4. Patient will gloablly improve cervical ROM to >10-15% from initial evaluation.     PLAN     Plan of care Certification: 10/25/2022 to 12/20/2022 .     Outpatient Physical Therapy 2 times weekly for 8 weeks to include the following interventions: Cervical/Lumbar Traction, Electrical Stimulation  , Manual Therapy, Moist Heat/ Ice, Neuromuscular Re-ed, Patient Education, Self Care, Therapeutic Activities,  and Therapeutic Exercise.      Nova [place], PT     Pt may be seen by PTA as part of the rehabilitation team.     Ana Fitzpatrick, PTA

## 2022-11-07 ENCOUNTER — HOSPITAL ENCOUNTER (OUTPATIENT)
Dept: RADIOLOGY | Facility: HOSPITAL | Age: 67
Discharge: HOME | End: 2022-11-07
Attending: NURSE PRACTITIONER
Payer: MEDICARE

## 2022-11-07 ENCOUNTER — CLINICAL SUPPORT (OUTPATIENT)
Dept: REHABILITATION | Facility: HOSPITAL | Age: 67
End: 2022-11-07
Attending: NURSE PRACTITIONER
Payer: MEDICARE

## 2022-11-07 DIAGNOSIS — M48.02 SPINAL STENOSIS, CERVICAL REGION: ICD-10-CM

## 2022-11-07 DIAGNOSIS — M54.2 NECK PAIN: ICD-10-CM

## 2022-11-07 DIAGNOSIS — R29.3 ABNORMAL POSTURE: ICD-10-CM

## 2022-11-07 DIAGNOSIS — R42 DIZZINESS AND GIDDINESS: ICD-10-CM

## 2022-11-07 DIAGNOSIS — M48.02 CERVICAL SPINAL STENOSIS: Primary | ICD-10-CM

## 2022-11-07 PROCEDURE — 97140 MANUAL THERAPY 1/> REGIONS: CPT | Mod: PN

## 2022-11-07 PROCEDURE — 72052 X-RAY EXAM NECK SPINE 6/>VWS: CPT | Mod: TC

## 2022-11-07 PROCEDURE — 72125 CT NECK SPINE W/O DYE: CPT | Mod: 26,,, | Performed by: RADIOLOGY

## 2022-11-07 PROCEDURE — 72052 XR CERVICAL SPINE 5 VIEW WITH FLEX AND EXT: ICD-10-PCS | Mod: 26,,, | Performed by: RADIOLOGY

## 2022-11-07 PROCEDURE — 97110 THERAPEUTIC EXERCISES: CPT | Mod: PN

## 2022-11-07 PROCEDURE — 72125 CT CERVICAL SPINE WITHOUT CONTRAST: ICD-10-PCS | Mod: 26,,, | Performed by: RADIOLOGY

## 2022-11-07 PROCEDURE — 97112 NEUROMUSCULAR REEDUCATION: CPT | Mod: PN

## 2022-11-07 PROCEDURE — 72125 CT NECK SPINE W/O DYE: CPT | Mod: TC

## 2022-11-07 PROCEDURE — 72052 X-RAY EXAM NECK SPINE 6/>VWS: CPT | Mod: 26,,, | Performed by: RADIOLOGY

## 2022-11-07 NOTE — PROGRESS NOTES
[facility]   Physical Therapy Treatment Note     Name: Demarco Moseley [facility]  Clinic Number: [id number]    Therapy Diagnosis:   Encounter Diagnoses   Name Primary?    Cervical spinal stenosis Yes    Abnormal posture     Dizziness and giddiness     Neck pain      Physician: Carolyne Blank NP    Visit Date: 11/7/2022    Physician Orders: PT Eval and Treat   Medical Diagnosis from Referral: cervical spinal stenosis  Evaluation Date: 10/25/2022  Authorization Period Expiration: 11/22/2022  Plan of Care Expiration: 12/20/2022   Progress Note Due: 11/15/2022  Visit # / Visits authorized: 3/11  FOTO: 1/3     Precautions: Standard     PTA Visit #: 2/5     Time In: 1:45pm  Time Out: 2:30pm  Total Billable Time: 45 minutes    SUBJECTIVE     Pt reports: he is still having dizziness that hits him out of the blue - he states he can be watching TV when it hits him. He is still having soreness in his upper trapezius left>right. Headaches are just as frequent.     He was compliant with home exercise program.  Response to previous treatment: increased dizziness  Functional change: neck pain, limited neck ROM, occasional episodes of dizziness    Pain: 4/10 at rest   Location: left head and neck      OBJECTIVE     Objective Measures updated at progress report unless specified.     Treatment     Demarco received the treatments listed below:      therapeutic exercises to develop strength, endurance, ROM, flexibility, posture, and core stabilization for 15 minutes including:   Supine Chin Tuck 2x10x5 seconds  Seated Scapular Retractions 2x10  Shoulder reverse rolls, 2x10  Upper trapezius Stretch 3x30s   Levator scapulae Stretch 3x30s   Standing shoulder rows 20# cable, 2x10  Standing cable columns lats pull down with 10lb each arms, 2x10    NOT PREFORMED   Cervical AROM in rotation to Left and Right, 3x each way hold for 5 seconds  Cervical AROM in flexion and extension, 3x hold for 5 seconds     manual  therapy techniques: Joint mobilizations, Manual traction, Myofacial release, Soft tissue Mobilization, and Friction Massage were applied to the: cervical spine for 15 minutes, including:  Trigger Point Dry Needling: Bilateral upper trapezius, bilateral suboccipitals - multiple twitch responses to upper trapezius.     Soft-tisue mobilization upper trapezius, suboccipitals, levator scapulae, carevical paraspinals   in Supine C1-4  Manual upper trapezius stretching   Manual Cervical Traction    neuromuscular re-education activities to improve: Balance, Coordination, Proprioception, and Posture for 15 minutes. The following activities were included:   Ambulate with head looking in horizontal direction  Ambulate with head looking in vertical direction  Ambulation with diagonal head turns - few instances of instability.   Ambulation with x1 viewing    Not performed:   X1 viewing 3x1 minute seated   Narrow Base of Support Balance (Feet Together)   Left and Right Head Turns x1 minute   Up and Down Head Turns x1 minute    Eyes closed x1 minute    Vestibular exercises: refer to attached handout for review (deferred below today)     Oculomotor: Smooth pursuits    Oculomotor: Saccades  Gaze stabilization: Sitting   Gaze stabilization: Standing feet apart   Visuo-vestibular: Head/eyes moving in same direction   Visuo-vestibular: Head/eyes moving in opposite direction       Patient Education and Home Exercises     Home Exercises Provided and Patient Education Provided     Education provided:   - Trigger Point Dry Needling Intervention - risks, benefits, expected outcomes.     Written Home Exercises Provided: yes. Exercises were reviewed and Demarco was able to demonstrate them prior to the end of the session.  Demarco demonstrated good  understanding of the education provided. See EMR under Patient Instructions for exercises provided during therapy sessions    ASSESSMENT     Patient educated about benefit and risks of  trigger point dry needling. TrPDN preformed to bilateral upper trapezius with multiple twitch responses - patient noted less tingling into bilateral upper extremity. Patient educated on expected outcomes and results. Emphasized complaince with basic x1 viewing for home exercise program. Multiple instances of instability with diagonal head turns and x1 viewing with ambulation. Patient would benefit from continued skilled intervention.     Demarco Is progressing well towards his goals.   Pt prognosis is Good.     Pt will continue to benefit from skilled outpatient physical therapy to address the deficits listed in the problem list box on initial evaluation, provide pt/family education and to maximize pt's level of independence in the home and community environment.     Pt's spiritual, cultural and educational needs considered and pt agreeable to plan of care and goals.     Anticipated barriers to physical therapy: None    Goals:   Short Term Goals: 4 weeks   1. Patient demonstrates independence with HEP.   2. Patient demonstrates independence with Postural Awareness.   3. Patient will report cessation of constant upper extremity numbness and tingling to only complaints of intermittent.  4. Patient will improve cervical rotation to >55 degs     Long Term Goals: 8 weeks   1. Patient will report cessation of dizziness with driving and functional activities   2. Patient demonstrates increased  strength in right upper extremity to >60 pounds.  3. Patient demonstrates improved overall function per NDI to 37% or less.   4. Patient will gloablly improve cervical ROM to >10-15% from initial evaluation.     PLAN     Plan of care Certification: 10/25/2022 to 12/20/2022 .     Outpatient Physical Therapy 2 times weekly for 8 weeks to include the following interventions: Cervical/Lumbar Traction, Electrical Stimulation  , Manual Therapy, Moist Heat/ Ice, Neuromuscular Re-ed, Patient Education, Self Care, Therapeutic  Activities, and Therapeutic Exercise.      Nova England, PT     Pt may be seen by PTA as part of the rehabilitation team.     Nova [place], PT

## 2022-11-09 ENCOUNTER — OFFICE VISIT (OUTPATIENT)
Dept: CARDIOLOGY | Facility: CLINIC | Age: 67
End: 2022-11-09
Payer: MEDICARE

## 2022-11-09 ENCOUNTER — CLINICAL SUPPORT (OUTPATIENT)
Dept: REHABILITATION | Facility: HOSPITAL | Age: 67
End: 2022-11-09
Payer: MEDICARE

## 2022-11-09 ENCOUNTER — OFFICE VISIT (OUTPATIENT)
Dept: NEUROSURGERY | Facility: CLINIC | Age: 67
End: 2022-11-09
Payer: MEDICARE

## 2022-11-09 VITALS
SYSTOLIC BLOOD PRESSURE: 129 MMHG | DIASTOLIC BLOOD PRESSURE: 89 MMHG | HEIGHT: 70 IN | BODY MASS INDEX: 29.63 KG/M2 | HEART RATE: 69 BPM | WEIGHT: 207 LBS | RESPIRATION RATE: 20 BRPM

## 2022-11-09 VITALS
SYSTOLIC BLOOD PRESSURE: 138 MMHG | HEIGHT: 70 IN | BODY MASS INDEX: 29.63 KG/M2 | WEIGHT: 207 LBS | DIASTOLIC BLOOD PRESSURE: 84 MMHG | HEART RATE: 67 BPM

## 2022-11-09 DIAGNOSIS — Z82.49 FAMILY HISTORY OF EARLY CAD: ICD-10-CM

## 2022-11-09 DIAGNOSIS — I25.10 CORONARY ARTERY DISEASE INVOLVING NATIVE CORONARY ARTERY OF NATIVE HEART WITHOUT ANGINA PECTORIS: Primary | ICD-10-CM

## 2022-11-09 DIAGNOSIS — E78.00 PURE HYPERCHOLESTEROLEMIA: ICD-10-CM

## 2022-11-09 DIAGNOSIS — M48.02 CERVICAL SPINAL STENOSIS: Primary | ICD-10-CM

## 2022-11-09 DIAGNOSIS — I10 ESSENTIAL HYPERTENSION: ICD-10-CM

## 2022-11-09 DIAGNOSIS — R29.3 ABNORMAL POSTURE: ICD-10-CM

## 2022-11-09 DIAGNOSIS — R42 DIZZINESS AND GIDDINESS: ICD-10-CM

## 2022-11-09 PROCEDURE — 1160F RVW MEDS BY RX/DR IN RCRD: CPT | Mod: CPTII,S$GLB,, | Performed by: INTERNAL MEDICINE

## 2022-11-09 PROCEDURE — 1160F PR REVIEW ALL MEDS BY PRESCRIBER/CLIN PHARMACIST DOCUMENTED: ICD-10-PCS | Mod: CPTII,S$GLB,, | Performed by: INTERNAL MEDICINE

## 2022-11-09 PROCEDURE — 97140 MANUAL THERAPY 1/> REGIONS: CPT | Mod: PN

## 2022-11-09 PROCEDURE — 99214 PR OFFICE/OUTPT VISIT, EST, LEVL IV, 30-39 MIN: ICD-10-PCS | Mod: S$GLB,,, | Performed by: INTERNAL MEDICINE

## 2022-11-09 PROCEDURE — 3044F HG A1C LEVEL LT 7.0%: CPT | Mod: CPTII,S$GLB,, | Performed by: INTERNAL MEDICINE

## 2022-11-09 PROCEDURE — 3079F DIAST BP 80-89 MM HG: CPT | Mod: CPTII,S$GLB,, | Performed by: NEUROLOGICAL SURGERY

## 2022-11-09 PROCEDURE — 3008F PR BODY MASS INDEX (BMI) DOCUMENTED: ICD-10-PCS | Mod: CPTII,S$GLB,, | Performed by: INTERNAL MEDICINE

## 2022-11-09 PROCEDURE — 4010F PR ACE/ARB THEARPY RXD/TAKEN: ICD-10-PCS | Mod: CPTII,S$GLB,, | Performed by: INTERNAL MEDICINE

## 2022-11-09 PROCEDURE — 3008F PR BODY MASS INDEX (BMI) DOCUMENTED: ICD-10-PCS | Mod: CPTII,S$GLB,, | Performed by: NEUROLOGICAL SURGERY

## 2022-11-09 PROCEDURE — 3044F PR MOST RECENT HEMOGLOBIN A1C LEVEL <7.0%: ICD-10-PCS | Mod: CPTII,S$GLB,, | Performed by: NEUROLOGICAL SURGERY

## 2022-11-09 PROCEDURE — 3288F PR FALLS RISK ASSESSMENT DOCUMENTED: ICD-10-PCS | Mod: CPTII,S$GLB,, | Performed by: INTERNAL MEDICINE

## 2022-11-09 PROCEDURE — 4010F ACE/ARB THERAPY RXD/TAKEN: CPT | Mod: CPTII,S$GLB,, | Performed by: INTERNAL MEDICINE

## 2022-11-09 PROCEDURE — 3075F SYST BP GE 130 - 139MM HG: CPT | Mod: CPTII,S$GLB,, | Performed by: NEUROLOGICAL SURGERY

## 2022-11-09 PROCEDURE — 99214 OFFICE O/P EST MOD 30 MIN: CPT | Mod: S$GLB,,, | Performed by: INTERNAL MEDICINE

## 2022-11-09 PROCEDURE — 1101F PR PT FALLS ASSESS DOC 0-1 FALLS W/OUT INJ PAST YR: ICD-10-PCS | Mod: CPTII,S$GLB,, | Performed by: NEUROLOGICAL SURGERY

## 2022-11-09 PROCEDURE — 99999 PR PBB SHADOW E&M-EST. PATIENT-LVL III: ICD-10-PCS | Mod: PBBFAC,,, | Performed by: NEUROLOGICAL SURGERY

## 2022-11-09 PROCEDURE — 3079F DIAST BP 80-89 MM HG: CPT | Mod: CPTII,S$GLB,, | Performed by: INTERNAL MEDICINE

## 2022-11-09 PROCEDURE — 1159F PR MEDICATION LIST DOCUMENTED IN MEDICAL RECORD: ICD-10-PCS | Mod: CPTII,S$GLB,, | Performed by: INTERNAL MEDICINE

## 2022-11-09 PROCEDURE — 99999 PR PBB SHADOW E&M-EST. PATIENT-LVL III: CPT | Mod: PBBFAC,,, | Performed by: INTERNAL MEDICINE

## 2022-11-09 PROCEDURE — 3288F FALL RISK ASSESSMENT DOCD: CPT | Mod: CPTII,S$GLB,, | Performed by: NEUROLOGICAL SURGERY

## 2022-11-09 PROCEDURE — 1125F AMNT PAIN NOTED PAIN PRSNT: CPT | Mod: CPTII,S$GLB,, | Performed by: NEUROLOGICAL SURGERY

## 2022-11-09 PROCEDURE — 99999 PR PBB SHADOW E&M-EST. PATIENT-LVL III: ICD-10-PCS | Mod: PBBFAC,,, | Performed by: INTERNAL MEDICINE

## 2022-11-09 PROCEDURE — 3079F PR MOST RECENT DIASTOLIC BLOOD PRESSURE 80-89 MM HG: ICD-10-PCS | Mod: CPTII,S$GLB,, | Performed by: NEUROLOGICAL SURGERY

## 2022-11-09 PROCEDURE — 1159F MED LIST DOCD IN RCRD: CPT | Mod: CPTII,S$GLB,, | Performed by: INTERNAL MEDICINE

## 2022-11-09 PROCEDURE — 99215 PR OFFICE/OUTPT VISIT, EST, LEVL V, 40-54 MIN: ICD-10-PCS | Mod: S$GLB,,, | Performed by: NEUROLOGICAL SURGERY

## 2022-11-09 PROCEDURE — 97112 NEUROMUSCULAR REEDUCATION: CPT | Mod: PN

## 2022-11-09 PROCEDURE — 3074F PR MOST RECENT SYSTOLIC BLOOD PRESSURE < 130 MM HG: ICD-10-PCS | Mod: CPTII,S$GLB,, | Performed by: INTERNAL MEDICINE

## 2022-11-09 PROCEDURE — 3008F BODY MASS INDEX DOCD: CPT | Mod: CPTII,S$GLB,, | Performed by: NEUROLOGICAL SURGERY

## 2022-11-09 PROCEDURE — 1160F RVW MEDS BY RX/DR IN RCRD: CPT | Mod: CPTII,S$GLB,, | Performed by: NEUROLOGICAL SURGERY

## 2022-11-09 PROCEDURE — 99215 OFFICE O/P EST HI 40 MIN: CPT | Mod: S$GLB,,, | Performed by: NEUROLOGICAL SURGERY

## 2022-11-09 PROCEDURE — 3044F HG A1C LEVEL LT 7.0%: CPT | Mod: CPTII,S$GLB,, | Performed by: NEUROLOGICAL SURGERY

## 2022-11-09 PROCEDURE — 3079F PR MOST RECENT DIASTOLIC BLOOD PRESSURE 80-89 MM HG: ICD-10-PCS | Mod: CPTII,S$GLB,, | Performed by: INTERNAL MEDICINE

## 2022-11-09 PROCEDURE — 1126F PR PAIN SEVERITY QUANTIFIED, NO PAIN PRESENT: ICD-10-PCS | Mod: CPTII,S$GLB,, | Performed by: INTERNAL MEDICINE

## 2022-11-09 PROCEDURE — 3288F PR FALLS RISK ASSESSMENT DOCUMENTED: ICD-10-PCS | Mod: CPTII,S$GLB,, | Performed by: NEUROLOGICAL SURGERY

## 2022-11-09 PROCEDURE — 4010F PR ACE/ARB THEARPY RXD/TAKEN: ICD-10-PCS | Mod: CPTII,S$GLB,, | Performed by: NEUROLOGICAL SURGERY

## 2022-11-09 PROCEDURE — 1159F PR MEDICATION LIST DOCUMENTED IN MEDICAL RECORD: ICD-10-PCS | Mod: CPTII,S$GLB,, | Performed by: NEUROLOGICAL SURGERY

## 2022-11-09 PROCEDURE — 1101F PR PT FALLS ASSESS DOC 0-1 FALLS W/OUT INJ PAST YR: ICD-10-PCS | Mod: CPTII,S$GLB,, | Performed by: INTERNAL MEDICINE

## 2022-11-09 PROCEDURE — 4010F ACE/ARB THERAPY RXD/TAKEN: CPT | Mod: CPTII,S$GLB,, | Performed by: NEUROLOGICAL SURGERY

## 2022-11-09 PROCEDURE — 3008F BODY MASS INDEX DOCD: CPT | Mod: CPTII,S$GLB,, | Performed by: INTERNAL MEDICINE

## 2022-11-09 PROCEDURE — 3074F SYST BP LT 130 MM HG: CPT | Mod: CPTII,S$GLB,, | Performed by: INTERNAL MEDICINE

## 2022-11-09 PROCEDURE — 1101F PT FALLS ASSESS-DOCD LE1/YR: CPT | Mod: CPTII,S$GLB,, | Performed by: INTERNAL MEDICINE

## 2022-11-09 PROCEDURE — 3075F PR MOST RECENT SYSTOLIC BLOOD PRESS GE 130-139MM HG: ICD-10-PCS | Mod: CPTII,S$GLB,, | Performed by: NEUROLOGICAL SURGERY

## 2022-11-09 PROCEDURE — 1160F PR REVIEW ALL MEDS BY PRESCRIBER/CLIN PHARMACIST DOCUMENTED: ICD-10-PCS | Mod: CPTII,S$GLB,, | Performed by: NEUROLOGICAL SURGERY

## 2022-11-09 PROCEDURE — 3044F PR MOST RECENT HEMOGLOBIN A1C LEVEL <7.0%: ICD-10-PCS | Mod: CPTII,S$GLB,, | Performed by: INTERNAL MEDICINE

## 2022-11-09 PROCEDURE — 3288F FALL RISK ASSESSMENT DOCD: CPT | Mod: CPTII,S$GLB,, | Performed by: INTERNAL MEDICINE

## 2022-11-09 PROCEDURE — 1159F MED LIST DOCD IN RCRD: CPT | Mod: CPTII,S$GLB,, | Performed by: NEUROLOGICAL SURGERY

## 2022-11-09 PROCEDURE — 1125F PR PAIN SEVERITY QUANTIFIED, PAIN PRESENT: ICD-10-PCS | Mod: CPTII,S$GLB,, | Performed by: NEUROLOGICAL SURGERY

## 2022-11-09 PROCEDURE — 99999 PR PBB SHADOW E&M-EST. PATIENT-LVL III: CPT | Mod: PBBFAC,,, | Performed by: NEUROLOGICAL SURGERY

## 2022-11-09 PROCEDURE — 1101F PT FALLS ASSESS-DOCD LE1/YR: CPT | Mod: CPTII,S$GLB,, | Performed by: NEUROLOGICAL SURGERY

## 2022-11-09 PROCEDURE — 1126F AMNT PAIN NOTED NONE PRSNT: CPT | Mod: CPTII,S$GLB,, | Performed by: INTERNAL MEDICINE

## 2022-11-09 NOTE — PROGRESS NOTES
[facility]   Physical Therapy Treatment Note     Name: Demarco Moseley [facility]  Clinic Number: [id number]    Therapy Diagnosis:   Encounter Diagnoses   Name Primary?    Cervical spinal stenosis Yes    Abnormal posture     Dizziness and giddiness      Physician: Carolyne Blank NP    Visit Date: 11/9/2022    Physician Orders: PT Eval and Treat   Medical Diagnosis from Referral: cervical spinal stenosis  Evaluation Date: 10/25/2022  Authorization Period Expiration: 11/22/2022  Plan of Care Expiration: 12/20/2022   Progress Note Due: 11/15/2022  Visit # / Visits authorized: 5/11  FOTO: 1/3     Precautions: Standard     PTA Visit #: 2/5     Time In: 2:45pm (patient arrived 15 minutes late)   Time Out: 2:30pm  Total Billable Time: 45 minutes    SUBJECTIVE     Pt reports: he had apts all day in [place]. He saw the cardiologist and neurologist. Neurologist discussed possible surgical intervention due to stenosis and cord compression. Patient expresses that he would like to continue with conservative management at this time. Neurologist thinks dizziness is BPPV related - patient reports this is continuing to improve with current plan of care.    Dry Needling gave him pain relief for 1 day - he is still very sore.    He was compliant with home exercise program.  Response to previous treatment: increased dizziness  Functional change: neck pain, limited neck ROM, occasional episodes of dizziness    Pain: 4/10 at rest   Location: left head and neck      OBJECTIVE     Objective Measures updated at progress report unless specified.     [place]               Left: negative no symptoms              Right: negative no symptoms  Roll Test:               Left: negative  no symptoms              Right: negative no symptoms  Side Lie Test:               Left: negative               Right: negative     Treatment     Demarco received the treatments listed below:      therapeutic exercises to develop  strength, endurance, ROM, flexibility, posture, and core stabilization for 0 minutes including:   Supine Chin Tuck 2x10x5 seconds  Seated Scapular Retractions 2x10  Shoulder reverse rolls, 2x10  Upper trapezius Stretch 3x30s   Levator scapulae Stretch 3x30s   Standing shoulder rows 20# cable, 2x10  Standing cable columns lats pull down with 10lb each arms, 2x10    NOT PREFORMED   Cervical AROM in rotation to Left and Right, 3x each way hold for 5 seconds  Cervical AROM in flexion and extension, 3x hold for 5 seconds     manual therapy techniques: Joint mobilizations, Manual traction, Myofacial release, Soft tissue Mobilization, and Friction Massage were applied to the: cervical spine for 15 minutes, including:  Soft-tisue mobilization upper trapezius, suboccipitals, levator scapulae, carevical paraspinals   in Supine C1-4  Manual upper trapezius stretching   Manual Cervical Traction    neuromuscular re-education activities to improve: Balance, Coordination, Proprioception, and Posture for 23 minutes. The following activities were included:   Ambulate with head looking in horizontal direction  Ambulate with head looking in vertical direction  Ambulation with diagonal head turns - few instances of instability.   Ambulation with x1 viewing  X1 viewing on Foam NBOS 3x30s  Semi-Tandem Stance on Foam - horizontal and vertical head turns and eyes close 30 seconds each    Cervical SNAGS for rotation x10    BPPV assessment see above.    Not performed:   X1 viewing 3x1 minute seated   Narrow Base of Support Balance (Feet Together)   Left and Right Head Turns x1 minute   Up and Down Head Turns x1 minute    Eyes closed x1 minute    Vestibular exercises: refer to attached handout for review (deferred below today)     Oculomotor: Smooth pursuits    Oculomotor: Saccades  Gaze stabilization: Sitting   Gaze stabilization: Standing feet apart   Visuo-vestibular: Head/eyes moving in same direction   Visuo-vestibular: Head/eyes  moving in opposite direction       Patient Education and Home Exercises     Home Exercises Provided and Patient Education Provided     Education provided:   - possible causes of dizziness    Written Home Exercises Provided: yes. Exercises were reviewed and Demarco was able to demonstrate them prior to the end of the session.  Demarco demonstrated good  understanding of the education provided. See EMR under Patient Instructions for exercises provided during therapy sessions    ASSESSMENT     Dry needling held today due to prolonged soreness. Patient continues to have limited cervical rotation - progressed home exercise program with cervical snags. Patient progressing with higher level vestibular integration exercises. More evidence of imbalance on complaint surfaces with eyes closed. Patient reports he does have left lower extremity weakness compared to right.     Demarco Is progressing well towards his goals.   Pt prognosis is Good.     Pt will continue to benefit from skilled outpatient physical therapy to address the deficits listed in the problem list box on initial evaluation, provide pt/family education and to maximize pt's level of independence in the home and community environment.     Pt's spiritual, cultural and educational needs considered and pt agreeable to plan of care and goals.     Anticipated barriers to physical therapy: None    Goals:   Short Term Goals: 4 weeks   1. Patient demonstrates independence with HEP.   2. Patient demonstrates independence with Postural Awareness.   3. Patient will report cessation of constant upper extremity numbness and tingling to only complaints of intermittent.  4. Patient will improve cervical rotation to >55 degs     Long Term Goals: 8 weeks   1. Patient will report cessation of dizziness with driving and functional activities   2. Patient demonstrates increased  strength in right upper extremity to >60 pounds.  3. Patient demonstrates improved overall  function per NDI to 37% or less.   4. Patient will gloablly improve cervical ROM to >10-15% from initial evaluation.     PLAN     Plan of care Certification: 10/25/2022 to 12/20/2022 .     Outpatient Physical Therapy 2 times weekly for 8 weeks to include the following interventions: Cervical/Lumbar Traction, Electrical Stimulation  , Manual Therapy, Moist Heat/ Ice, Neuromuscular Re-ed, Patient Education, Self Care, Therapeutic Activities, and Therapeutic Exercise.      Nova [place], PT     Pt may be seen by PTA as part of the rehabilitation team.     Nova [place], PT

## 2022-11-09 NOTE — PROGRESS NOTES
Neurosurgery  Established Patient    SUBJECTIVE:     History of Present Illness:  Mr. Means, is a 67-year-old male who was previously referred by his primary care physician Marky Mills MD, and seen by a neurosurgeon nurse practitioner colleague Carolyne Blank NP.  He had some concerns for cervical radiculopathy and postconcussive symptoms since he fell about 2 months ago.  He fell at work and landed backwards without any loss of consciousness but continues to have dizziness for a few seconds.  He continues to complain of neck pain and nonradicular paresthesias in the arms right greater than left associated with some tingling.  Says the pain is approximately 4/10 in severity.  He is had a long history of neck stiffness, as well as lumbar axial back pain for several years.  He can appreciate any specific aggravating factors or relieving factors.  Denies any weakness, bowel or bladder incontinence, saddle anesthesia, gait instability.  He most recently had some chiropractic adjustments, as well as dry needling which gave him significant relief at the time of implementation.    Review of patient's allergies indicates:   Allergen Reactions    1,3-butylene glycol Shortness Of Breath    Methylprednisolone sod phos Swelling    Ciprofloxacin Nausea Only and Other (See Comments)    Levofloxacin Other (See Comments)    Sulfacetamide sodium Other (See Comments)       Current Outpatient Medications   Medication Sig Dispense Refill    meclizine (ANTIVERT) 25 mg tablet Take 1 tablet (25 mg total) by mouth 3 (three) times daily as needed. 15 tablet 0    sildenafil (VIAGRA) 100 MG tablet Take 1 tablet (100 mg total) by mouth daily as needed for Erectile Dysfunction. 10 tablet 2    triamcinolone acetonide 0.1% (KENALOG) 0.1 % cream Apply topically 2 (two) times daily. for 7 days (Patient not taking: Reported on 10/24/2022) 1 Tube 0     No current facility-administered medications for this visit.       Past Medical  "History:   Diagnosis Date    Agatston CAC score, >400 4/11/2016    813     Agatston CAC score, >400     Allergy     Back pain     BPH (benign prostatic hypertrophy)     Colon polyps     per indication from colonoscopy on 9.27.2006    Degenerative disc disease     GERD (gastroesophageal reflux disease)     Hyperlipidemia     Hypertension     Kidney stones     Meniere disease      Past Surgical History:   Procedure Laterality Date    BACK SURGERY      COLONOSCOPY N/A 4/7/2016    Procedure: COLONOSCOPY;  Surgeon: Esau Helms MD;  Location: [address]);  Service: Endoscopy;  Laterality: N/A;  Last procedure by Scooter 4/6/2011    EYE SURGERY Bilateral     bilateral    SPINE SURGERY  2007    3 lumbar surgeries     Family History       Problem Relation (Age of Onset)    Anuerysm Brother    Cancer Maternal Uncle    Glaucoma Mother    Heart disease Father, Mother, Sister    Hypertension Father    Kidney disease Mother    No Known Problems Sister, Sister, Son    Parkinsonism Father          Social History     Socioeconomic History    Marital status:     Number of children: 1   Occupational History    Occupation:    Tobacco Use    Smoking status: Never    Smokeless tobacco: Never   Substance and Sexual Activity    Alcohol use: No     Alcohol/week: 0.0 standard drinks    Drug use: No    Sexual activity: Yes     Partners: Female       Review of Systems    OBJECTIVE:     Vital Signs  Pulse: 67  BP: 138/84  Pain Score:   6  Height: 5' 10" (177.8 cm)  Weight: 93.9 kg (207 lb)  Body mass index is 29.7 kg/m².    Neurosurgery Physical Exam  General: well developed, well nourished, no distress.   Head: normocephalic, atraumatic  Neurologic: Alert and oriented. Thought content appropriate.  GCS: Motor: 6/Verbal: 5/Eyes: 4 GCS Total: 15  Mental Status: Awake, Alert, Oriented x 4  Language: No aphasia  Speech: No dysarthria  Cranial nerves: face symmetric, tongue midline, CN II-XII grossly intact.   Eyes: " pupils equal, round, reactive to light with accomodation, EOMI.   Pulmonary: normal respirations, no signs of respiratory distress  Abdomen: soft, non-distended  Skin: Skin is warm, dry and intact.  Sensory: intact to light touch throughout  Motor Strength:Moves all extremities spontaneously with good tone.    Strength   Deltoids Triceps Biceps Wrist Extension Wrist Flexion Hand    Upper: R 5/5 5/5 5/5 5/5 5/5 5/5     L 5/5 5/5 5/5 5/5 5/5 5/5       HF KE KF DF PF EHL   Lower: R 5/5 5/5 5/5 5/5 5/5 5/5     L 5/5 5/5 5/5 5/5 5/5 5/5      Reflexes:   Gupta's: Negative     Cerebellar:   Gait stable, fluid.   Tandem Gait: No difficulty  Able to walk on heels & toes     Cervical:   ROM: Pain limited with flexion, extension, lateral rotation and ear-to-shoulder bend.   Midline TTP: Positive     Thoracic:  Midline TTP: Negative.    Negative Spurling  Negative Lhermitte    Diagnostic Results:  I personally reviewed patient's Diagnostic Imaging.  Patient has multilevel degenerative changes in the cervical spine with stenosis from C3/C4/C5/C6/C7.  Most pronounced at C3-4, and C5-6/C6-7.  No appreciable signal change in the cervical spine.  There is well-maintained lordosis in the cervical spine.  With calcification C6/C7 interspace height loss most pronounced at C5/C6 and C7.  In some moderate sclerosis of the bone and posterior calcification at C3/C4.  Flexion-extension films of the cervical spine with no focal segmental instability multilevel degenerative changes as noted on the CT scan and MRI.    ASSESSMENT/PLAN:     67-year-old man with neck pain, cervical stenosis and concern for postconcussive symptoms.    1. Patient with no focal motor or sensory deficits on examination.  There is some decreased range of motion in the right middle and index finger.  But no appreciable decrement in strength.  2. MRI with multilevel degenerative changes in cervical stenosis throughout from C3-C7.  Similarly seen on the CT scan of  the cervical spine with most notably at C3/C4 and C6/C7 with posterior calcification of the disc ligamentous complex.  3. Had long discussion with the patient regarding any potential cervical decompression.  Given the patient is not currently myelopathic and primarily axial neck pain, continued conservative, nonsurgical treatments may be ideal, especially given the patient's apprehensiveness for surgical decompression stabilization.  Patient notes that he is had some notable improvement with chiropractic, and dry needling.  Would recommend continuing that course as long as provide symptomatic relief.  Did discuss with patient should he need decompression that, more than likely a posterior approach may be more ideal given the multilevel degenerative changes, and the decrease likelihood of adequate decompression given the calcification at the discal ligamentous complex from anterior.  We will continue to follow as needed by the patient.  Answered all the patient's questions to his satisfaction.      Thank you so very much for allowing me to participate in the care of this patient.  Please feel free to call any questions, comments, concerns.    Gilbert Juarez MD,MSc  Department of Neurosurgery   Department of Radiology  Department of Neurology  [facility]    [facility]   [employer] [facility] [facility] [facility] [employer] [facility] [employer]    Total time spent in counseling and discussion about further management options including relevant lab work, treatment,  prognosis, medications and intended side effects was more than 60 minutes. More than 50 % of the time was spent in counseling and coordination of care.  I spent a total of 60 minutes on the day of the visit.This includes face to face time and non-face to face time preparing to see the patient (eg, review of tests), Obtaining and/or reviewing separately obtained history, Documenting  clinical information in the electronic or other health record, Independently interpreting resultsand communicating results to the patient/family/caregiver, or Care coordination     Note dictated with voice recognition software, please excuse any grammatical errors.

## 2022-11-09 NOTE — PROGRESS NOTES
HISTORY:    67-year-old male with a history of hypertension, hyperlipidemia, and elevated calcium score presenting for follow-up.     Patient getting ready to retire for DDD and orthopedic issues. He is disappointed about this.    From a CV standpoint he reports a strong family history of cardiovascular disease including his brother and sister.  He himself denies any previous history of myocardial infarction, percutaneous coronary intervention, stroke, peripheral arterial disease, peripheral intervention, or VTE.     At baseline the patient denies any issues with chest pain, shortness of breath, or dyspnea on exertion.  He does not have any dedicated exercise time, however, he is extremely active in his life.  He continues to work doing manual labor and has no cardiovascular limitation in his daily tasks.  Additionally he is active around the house and completes ADLs as well as work projects without any issue.  The patient does have limitations related to DDD and radiculopathy.     He was previously on losartan and ezetimibe therapy, but discontinued this in January.  He has previously tried different statins with significant myopathy.      PHYSICAL EXAM:    Vitals:    11/09/22 0927   BP: 129/89   Pulse: 69   Resp: 20     NAD, A+Ox3.  No jvd, no bruit.  RRR nml s1,s2. No murmurs.  CTA B no wheezes or crackles.  2+ B radial and 1+ B DP/PT. No edema.      LABS/STUDIES (imaging reviewed during clinic visit):    October 2022 CBC and CMP normal.   with an HDL of 33.  Triglycerides 199 down from 558 last year.  ECG today demonstrates sinus rhythm with no Q-waves or ST changes.  Stress echo 2020 showed no evidence of ischemia.  5 minutes and 29 seconds on a high ramp protocol.  Normal TTE at baseline and with stress.  CT cardiac scoring 2016 with a calcium score of 813.     ASSESSMENT & PLAN:    1. Coronary artery disease involving native coronary artery of native heart without angina pectoris    2. Essential  hypertension    3. Family history of early CAD    4. Pure hypercholesterolemia                The patient has asymptomatic coronary artery disease evidenced by calcium score.  Activity levels are acceptable.  Will focus on risk factor modification at this time.  He had a stress echo in September 2020 that was negative for ischemia.     Blood pressures are slightly above goal and the patient understands that I recommend attempt to reduce blood pressure.  He also has hyperlipidemia and understands that I recommend aggressive lowering. Has had issues with statins. Ezetemibe was previously tolerated. Would consider PCSK9i.    He remains tentative about medicines at this time and wants to focus on weight loss. Did have a 20 lb weight loss with a drop in Tgs, but has gained back some weight. New weight goal set for 190.     Dirk Marti MD

## 2022-11-14 ENCOUNTER — CLINICAL SUPPORT (OUTPATIENT)
Dept: REHABILITATION | Facility: HOSPITAL | Age: 67
End: 2022-11-14
Payer: MEDICARE

## 2022-11-14 DIAGNOSIS — R42 DIZZINESS AND GIDDINESS: ICD-10-CM

## 2022-11-14 DIAGNOSIS — R29.3 ABNORMAL POSTURE: ICD-10-CM

## 2022-11-14 DIAGNOSIS — M48.02 CERVICAL SPINAL STENOSIS: Primary | ICD-10-CM

## 2022-11-14 PROCEDURE — 97112 NEUROMUSCULAR REEDUCATION: CPT | Mod: PN

## 2022-11-14 PROCEDURE — 97110 THERAPEUTIC EXERCISES: CPT | Mod: PN

## 2022-11-14 PROCEDURE — 97140 MANUAL THERAPY 1/> REGIONS: CPT | Mod: PN

## 2022-11-14 NOTE — PROGRESS NOTES
[facility]   Physical Therapy Progress Note     [facility]  Cl[facility] Number: [id number]    Therapy Diagnosis:   Encounter Diagnoses   Name Primary?    Cervical spinal stenosis Yes    Abnormal posture     Dizziness and giddiness        Physician: Carolyne Blank NP  Visit Date: 11/14/2022    Physician Orders: PT Eval and Treat   Medical Diagnosis from Referral: cervical spinal stenosis  Evaluation Date: 10/25/2022  Authorization Period Expiration: 11/22/2022  Plan of Care Expiration: 12/20/2022   Progress Note Due: 12/14/2022  Visit # / Visits authorized: 6/11  FOTO: 1/3     Precautions: Standard     PTA Visit #: 2/5     Time In: 2:30pm   Time Out: 3:10pm  Total Billable Time: 40 minutes    SUBJECTIVE     Pt reports: reports overall his neck ROM has improved a little bit. His headahes have improved. He is still waking up with bilateral upper extremity numbness and tingling (worse of right upper extremity). Dizziness is some better - not as frequent.     Dry Needling gave him pain relief for 1 day - he is still very sore.    He was compliant with home exercise program.  Response to previous treatment: increased dizziness  Functional change: neck pain, limited neck ROM, occasional episodes of dizziness    Pain: 0/10 at rest   Location: left head and neck      OBJECTIVE     Objective Measures updated at progress report unless specified.         CERVICAL ACTIVE RANGE OF MOTION   Flexion 55 deg    Extension 45 deg   Right Side Bend 30 deg    Left Side Bend 30 deg muscle pull   Right Rotation 60 deg    Left Rotation 60 deg muscle pull   Craniocervical Angle 35 deg     UPPER LOWER EXTREMITY STRENGTH     Left  Right     Strength (right dominant)  65 pound average 55 pound average       Treatment     Demarco received the treatments listed below:      therapeutic exercises to develop strength, endurance, ROM, flexibility, posture, and core stabilization for 15 minutes  including:   Supine Chin Tuck 2x10x5 seconds  Seated Scapular Retractions 2x10  Upper trapezius Stretch 3x30s   Levator scapulae Stretch 3x30s   Pectoralis Stretch 2x30  Standing shoulder rows 20# cable, 2x10  Standing cable columns lats pull down with 13lb each arms, 2x10    NOT PREFORMED   Cervical AROM in rotation to Left and Right, 3x each way hold for 5 seconds  Cervical AROM in flexion and extension, 3x hold for 5 seconds   Shoulder reverse rolls, 2x10  Cervical SNAGS for rotation x10    manual therapy techniques: Joint mobilizations, Manual traction, Myofacial release, Soft tissue Mobilization, and Friction Massage were applied to the: cervical spine for 10 minutes, including:  Trigger Point Dry Needling: Bilateral upper trapezius - multiple twitch responses to left   Soft-tisue mobilization upper trapezius, suboccipitals, levator scapulae, carevical paraspinals    NOT PREFORMED:   in Supine C1-4  Manual upper trapezius stretching   Manual Cervical Traction    neuromuscular re-education activities to improve: Balance, Coordination, Proprioception, and Posture for 15 minutes. The following activities were included:   Ambulate with head looking in horizontal direction  Ambulate with head looking in vertical direction  Ambulation with diagonal head turns - few instances of instability.   Ambulation with x1 viewing  X1 viewing on Foam NBOS 3x30s  Semi-Tandem Stance on Foam - horizontal and vertical head turns and eyes close 30 seconds each    Not performed:   X1 viewing 3x1 minute seated   Narrow Base of Support Balance (Feet Together)   Left and Right Head Turns x1 minute   Up and Down Head Turns x1 minute    Eyes closed x1 minute    Vestibular exercises: refer to attached handout for review (deferred below today)     Oculomotor: Smooth pursuits    Oculomotor: Saccades  Gaze stabilization: Sitting   Gaze stabilization: Standing feet apart   Visuo-vestibular: Head/eyes moving in same direction    Visuo-vestibular: Head/eyes moving in opposite direction       Patient Education and Home Exercises     Home Exercises Provided and Patient Education Provided     Education provided:   - home exercise program     Written Home Exercises Provided: yes. Exercises were reviewed and Demarco was able to demonstrate them prior to the end of the session.  Demarco demonstrated good  understanding of the education provided. See EMR under Patient Instructions for exercises provided during therapy sessions    ASSESSMENT   Mr. Means has attended 5 out patient physical therapy sessions to address chronic neck pain and dizziness. Patient cervical ROM has improved greatly since initial evaluation (see above measurements). Patient reports an overall decrease in headache frequency. He has experienced less episodes of dizziness since starting therapy - he is still experiences random occurrences of dizziness however mechanism is not consistent. In addition, patient still experiencing upper extremity numbness and tingling that is worse in the AM.  strength with mild improvements since initial evaluation -  globally below age and gender norms. Patient would continue to benefit from skilled physical therapy to address the above impairments and continue to maximize available ROM.    Demarco Is progressing well towards his goals.   Pt prognosis is Good.     Pt will continue to benefit from skilled outpatient physical therapy to address the deficits listed in the problem list box on initial evaluation, provide pt/family education and to maximize pt's level of independence in the home and community environment.     Pt's spiritual, cultural and educational needs considered and pt agreeable to plan of care and goals.     Anticipated barriers to physical therapy: None    Goals:   Short Term Goals: 4 weeks   1. Patient demonstrates independence with HEP. - goal met 11/14/2022  2. Patient demonstrates independence with Postural  Awareness.  - goal met 11/14/2022  3. Patient will report cessation of constant upper extremity numbness and tingling to only complaints of intermittent. - goal progressing 11/14/2022 - worse in the AM  4. Patient will improve cervical rotation to >55 degs - goal met 11/14/2022     Long Term Goals: 8 weeks   1. Patient will report cessation of dizziness with driving and functional activities - goal progressing 11/14/2022  2. Patient demonstrates increased  strength in right upper extremity to >60 pounds. - goal progressing 11/14/2022  3. Patient demonstrates improved overall function per NDI to 37% or less. - goal progressing 11/14/2022  4. Patient will gloablly improve cervical ROM to >10-15% from initial evaluation. - goal met 11/14/2022    PLAN     Plan of care Certification: 10/25/2022 to 12/20/2022 .     Outpatient Physical Therapy 2 times weekly for 8 weeks to include the following interventions: Cervical/Lumbar Traction, Electrical Stimulation  , Manual Therapy, Moist Heat/ Ice, Neuromuscular Re-ed, Patient Education, Self Care, Therapeutic Activities, and Therapeutic Exercise.      Nova [place], PT     Pt may be seen by PTA as part of the rehabilitation team.     Nova [place], PT

## 2022-11-21 ENCOUNTER — CLINICAL SUPPORT (OUTPATIENT)
Dept: REHABILITATION | Facility: HOSPITAL | Age: 67
End: 2022-11-21
Payer: MEDICARE

## 2022-11-21 DIAGNOSIS — R29.3 ABNORMAL POSTURE: ICD-10-CM

## 2022-11-21 DIAGNOSIS — R42 DIZZINESS AND GIDDINESS: ICD-10-CM

## 2022-11-21 DIAGNOSIS — M48.02 CERVICAL SPINAL STENOSIS: Primary | ICD-10-CM

## 2022-11-21 PROCEDURE — 97110 THERAPEUTIC EXERCISES: CPT | Mod: PN

## 2022-11-21 PROCEDURE — 97112 NEUROMUSCULAR REEDUCATION: CPT | Mod: PN

## 2022-11-21 PROCEDURE — 97140 MANUAL THERAPY 1/> REGIONS: CPT | Mod: PN

## 2022-11-21 NOTE — PROGRESS NOTES
[facility]   Physical Therapy Progress Note     [facility]  Clinic Number: [id number]    Therapy Diagnosis:   Encounter Diagnoses   Name Primary?    Cervical spinal stenosis Yes    Abnormal posture     Dizziness and giddiness          Physician: Carolyne Blank NP  Visit Date: 11/21/2022    Physician Orders: PT Eval and Treat   Medical Diagnosis from Referral: cervical spinal stenosis  Evaluation Date: 10/25/2022  Authorization Period Expiration: 11/22/2022  Plan of Care Expiration: 12/20/2022   Progress Note Due: 12/14/2022  Visit # / Visits authorized: 7 / 11  FOTO: 1/3     Precautions: Standard     PTA Visit #: 2/5     Time In: 2:30pm   Time Out: 3:10pm  Total Billable Time: 40 minutes    SUBJECTIVE     Pt reports: needling was very helpful last visit. He had several days of pain relief. Just stiffness today.     He was compliant with home exercise program.  Response to previous treatment: increased dizziness  Functional change: neck pain, limited neck ROM, occasional episodes of dizziness    Pain: 0/10 at rest   Location: left head and neck      OBJECTIVE     Objective Measures updated at progress report unless specified.     Treatment     Demarco received the treatments listed below:      therapeutic exercises to develop strength, endurance, ROM, flexibility, posture, and core stabilization for 10 minutes including:     Standing shoulder rows 20# cable, 2x10  Standing cable columns lats pull down with 20lb each arms, 2x10  Cross Symmetry Shoulder Abduction 3# 2x10 - cues to avoid chin protraction     NOT PREFORMED   Cervical AROM in rotation to Left and Right, 3x each way hold for 5 seconds  Cervical AROM in flexion and extension, 3x hold for 5 seconds   Shoulder reverse rolls, 2x10  Cervical SNAGS for rotation x10  Supine Chin Tuck 2x10x5 seconds  Seated Scapular Retractions 2x10  Upper trapezius Stretch 3x30s   Levator scapulae Stretch 3x30s   Pectoralis Stretch  2x30    manual therapy techniques: Joint mobilizations, Manual traction, Myofacial release, Soft tissue Mobilization, and Friction Massage were applied to the: cervical spine for 20 minutes, including:  Trigger Point Dry Needling: Bilateral upper trapezius - multiple twitch responses to left, bilateral suboccipitals - notable reported tenderness  Soft-tisue mobilization upper trapezius, suboccipitals, levator scapulae, carevical paraspinals    NOT PREFORMED:   in Supine C1-4  Manual upper trapezius stretching   Manual Cervical Traction    neuromuscular re-education activities to improve: Balance, Coordination, Proprioception, and Posture for 10 minutes. The following activities were included:   Ambulate with head looking in horizontal direction  Ambulate with head looking in vertical direction  Ambulation with diagonal head turns - few instances of instability.   Ambulation with x1 viewing  X1 viewing on Foam NBOS 3x30s    Not performed:   X1 viewing 3x1 minute seated   Narrow Base of Support Balance (Feet Together)   Left and Right Head Turns x1 minute   Up and Down Head Turns x1 minute    Eyes closed x1 minute  Semi-Tandem Stance on Foam - horizontal and vertical head turns and eyes close 30 seconds each  Vestibular exercises: refer to attached handout for review (deferred below today)     Oculomotor: Smooth pursuits    Oculomotor: Saccades  Gaze stabilization: Sitting   Gaze stabilization: Standing feet apart   Visuo-vestibular: Head/eyes moving in same direction   Visuo-vestibular: Head/eyes moving in opposite direction       Patient Education and Home Exercises     Home Exercises Provided and Patient Education Provided     Education provided:   - home exercise program     Written Home Exercises Provided: yes. Exercises were reviewed and Demarco was able to demonstrate them prior to the end of the session.  Demarco demonstrated good  understanding of the education provided. See EMR under Patient  Instructions for exercises provided during therapy sessions    ASSESSMENT   Multiple twitch responses to bilateral upper trapezius. Patient endorsing improved ROM and stiffness following needling. Continued work on postural stabilizers. Patient required more cues to avoid cervical protraction with upper extremity exercises.  No evidence of imbalance or dizziness with vestibular exercises today.     Demarco Is progressing well towards his goals.   Pt prognosis is Good.     Pt will continue to benefit from skilled outpatient physical therapy to address the deficits listed in the problem list box on initial evaluation, provide pt/family education and to maximize pt's level of independence in the home and community environment.     Pt's spiritual, cultural and educational needs considered and pt agreeable to plan of care and goals.     Anticipated barriers to physical therapy: None    Goals:   Short Term Goals: 4 weeks   1. Patient demonstrates independence with HEP. - goal met 11/14/2022  2. Patient demonstrates independence with Postural Awareness.  - goal met 11/14/2022  3. Patient will report cessation of constant upper extremity numbness and tingling to only complaints of intermittent. - goal progressing 11/14/2022 - worse in the AM  4. Patient will improve cervical rotation to >55 degs - goal met 11/14/2022     Long Term Goals: 8 weeks   1. Patient will report cessation of dizziness with driving and functional activities - goal progressing 11/14/2022  2. Patient demonstrates increased  strength in right upper extremity to >60 pounds. - goal progressing 11/14/2022  3. Patient demonstrates improved overall function per NDI to 37% or less. - goal progressing 11/14/2022  4. Patient will gloablly improve cervical ROM to >10-15% from initial evaluation. - goal met 11/14/2022    PLAN     Plan of care Certification: 10/25/2022 to 12/20/2022 .     Outpatient Physical Therapy 2 times weekly for 8 weeks to include the  following interventions: Cervical/Lumbar Traction, Electrical Stimulation  , Manual Therapy, Moist Heat/ Ice, Neuromuscular Re-ed, Patient Education, Self Care, Therapeutic Activities, and Therapeutic Exercise.      Nova [place], PT     Pt may be seen by PTA as part of the rehabilitation team.     Nova [place], PT

## 2022-11-28 ENCOUNTER — CLINICAL SUPPORT (OUTPATIENT)
Dept: REHABILITATION | Facility: HOSPITAL | Age: 67
End: 2022-11-28
Payer: MEDICARE

## 2022-11-28 DIAGNOSIS — M54.2 NECK PAIN: ICD-10-CM

## 2022-11-28 DIAGNOSIS — M48.02 CERVICAL SPINAL STENOSIS: Primary | ICD-10-CM

## 2022-11-28 DIAGNOSIS — R42 DIZZINESS AND GIDDINESS: ICD-10-CM

## 2022-11-28 DIAGNOSIS — R29.3 ABNORMAL POSTURE: ICD-10-CM

## 2022-11-28 PROCEDURE — 97140 MANUAL THERAPY 1/> REGIONS: CPT | Mod: PN

## 2022-11-28 PROCEDURE — 97110 THERAPEUTIC EXERCISES: CPT | Mod: PN

## 2022-11-28 NOTE — PROGRESS NOTES
[facility] OUTPATIENT THERAPY AND WELLNESS   Physical Therapy Daily Note     [facility]  [facility] [id number]    Therapy Diagnosis:   Encounter Diagnoses   Name Primary?    Cervical spinal stenosis Yes    Abnormal posture     Neck pain     Dizziness and giddiness          Physician: Carolyne Blank NP  Visit Date: 11/28/2022    Physician Orders: PT Eval and Treat   Medical Diagnosis from Referral: cervical spinal stenosis  Evaluation Date: 10/25/2022  Authorization Period Expiration: 11/22/2022  Plan of Care Expiration: 12/20/2022   Progress Note Due: 12/14/2022  Visit # / Visits authorized: 8 / 11  FOTO: 1/3     Precautions: Standard     PTA Visit #: 2/5     Time In: 2:30pm   Time Out: 3:10pm  Total Billable Time: 40 minutes    SUBJECTIVE     Pt reports: needling went well last visit. Reports no pain or stiffness in his neck. Headaches are still at bay.     He was compliant with home exercise program.  Response to previous treatment: increased dizziness  Functional change: neck pain, limited neck ROM, occasional episodes of dizziness    Pain: 0/10 at rest   Location: left head and neck      OBJECTIVE     Objective Measures updated at progress report unless specified.     Treatment     Demarco received the treatments listed below:      therapeutic exercises to develop strength, endurance, ROM, flexibility, posture, and core stabilization for 23 minutes including:     Standing shoulder rows 20# cable, 2x10  Standing cable columns lats pull down with 20lb each arms, 2x10  Cross Symmetry Shoulder Abduction 3# 2x10 - cues to avoid chin protraction   Supine Chin Tuck 2x10x5 seconds  Chin tuck and lift 2x10   Resisted Supine Y's GTB 2x10  Resisted Bilateral upper extremity external rotation GTB 2x10   Resisted Open Book GTB with cervical rotation 2x10    NOT PREFORMED   Cervical AROM in rotation to Left and Right, 3x each way hold for 5 seconds  Cervical AROM in flexion and extension, 3x hold for 5  seconds   Shoulder reverse rolls, 2x10  Cervical SNAGS for rotation x10  Seated Scapular Retractions 2x10  Upper trapezius Stretch 3x30s   Levator scapulae Stretch 3x30s   Pectoralis Stretch 2x30    manual therapy techniques: Joint mobilizations, Manual traction, Myofacial release, Soft tissue Mobilization, and Friction Massage were applied to the: cervical spine for 15 minutes, including:  Soft-tisue mobilization upper trapezius, suboccipitals, levator scapulae, carevical paraspinals    NOT PREFORMED:   in Supine C1-4  Manual upper trapezius stretching   Manual Cervical Traction  Trigger Point Dry Needling: Bilateral upper trapezius - multiple twitch responses to left, bilateral suboccipitals - notable reported tenderness    neuromuscular re-education activities to improve: Balance, Coordination, Proprioception, and Posture for 0 minutes. The following activities were included: DEFERRED DUE TO BACK PAIN  Ambulate with head looking in horizontal direction  Ambulate with head looking in vertical direction  Ambulation with diagonal head turns - few instances of instability.   Ambulation with x1 viewing  X1 viewing on Foam NBOS 3x30s    Not performed:   X1 viewing 3x1 minute seated   Narrow Base of Support Balance (Feet Together)   Left and Right Head Turns x1 minute   Up and Down Head Turns x1 minute    Eyes closed x1 minute  Semi-Tandem Stance on Foam - horizontal and vertical head turns and eyes close 30 seconds each  Vestibular exercises: refer to attached handout for review (deferred below today)     Oculomotor: Smooth pursuits    Oculomotor: Saccades  Gaze stabilization: Sitting   Gaze stabilization: Standing feet apart   Visuo-vestibular: Head/eyes moving in same direction   Visuo-vestibular: Head/eyes moving in opposite direction       Patient Education and Home Exercises     Home Exercises Provided and Patient Education Provided     Education provided:   - home exercise program     Written Home Exercises  Provided: yes. Exercises were reviewed and Demarco was able to demonstrate them prior to the end of the session.  Demarco demonstrated good  understanding of the education provided. See EMR under Patient Instructions for exercises provided during therapy sessions    ASSESSMENT   No neck pain or stiffness reported today - however major flare up of low back pain. Exercises modified today to keep in supine position to avoid any further injury or exacerbation. Patient progressing towards goals for hopeful discharge in 3 visits.     Demarco Is progressing well towards his goals.   Pt prognosis is Good.     Pt will continue to benefit from skilled outpatient physical therapy to address the deficits listed in the problem list box on initial evaluation, provide pt/family education and to maximize pt's level of independence in the home and community environment.     Pt's spiritual, cultural and educational needs considered and pt agreeable to plan of care and goals.     Anticipated barriers to physical therapy: None    Goals:   Short Term Goals: 4 weeks   1. Patient demonstrates independence with HEP. - goal met 11/14/2022  2. Patient demonstrates independence with Postural Awareness.  - goal met 11/14/2022  3. Patient will report cessation of constant upper extremity numbness and tingling to only complaints of intermittent. - goal progressing 11/14/2022 - worse in the AM  4. Patient will improve cervical rotation to >55 degs - goal met 11/14/2022     Long Term Goals: 8 weeks   1. Patient will report cessation of dizziness with driving and functional activities - goal progressing 11/14/2022  2. Patient demonstrates increased  strength in right upper extremity to >60 pounds. - goal progressing 11/14/2022  3. Patient demonstrates improved overall function per NDI to 37% or less. - goal progressing 11/14/2022  4. Patient will gloablly improve cervical ROM to >10-15% from initial evaluation. - goal met 11/14/2022    PLAN      Plan of care Certification: 10/25/2022 to 12/20/2022 .     Outpatient Physical Therapy 2 times weekly for 8 weeks to include the following interventions: Cervical/Lumbar Traction, Electrical Stimulation  , Manual Therapy, Moist Heat/ Ice, Neuromuscular Re-ed, Patient Education, Self Care, Therapeutic Activities, and Therapeutic Exercise.      Nova [place], PT     Pt may be seen by PTA as part of the rehabilitation team.     Nova [place], PT

## 2022-12-05 ENCOUNTER — CLINICAL SUPPORT (OUTPATIENT)
Dept: REHABILITATION | Facility: HOSPITAL | Age: 67
End: 2022-12-05
Payer: MEDICARE

## 2022-12-05 DIAGNOSIS — R29.3 ABNORMAL POSTURE: ICD-10-CM

## 2022-12-05 DIAGNOSIS — R42 DIZZINESS AND GIDDINESS: ICD-10-CM

## 2022-12-05 DIAGNOSIS — M48.02 CERVICAL SPINAL STENOSIS: Primary | ICD-10-CM

## 2022-12-05 DIAGNOSIS — M54.2 NECK PAIN: ICD-10-CM

## 2022-12-05 PROCEDURE — 97140 MANUAL THERAPY 1/> REGIONS: CPT | Mod: PN

## 2022-12-05 NOTE — PROGRESS NOTES
[facility]   Physical Therapy Discharge Note     Name: Demarco Moseley [facility]  Clinic Number: [id number]    Therapy Diagnosis:   Encounter Diagnoses   Name Primary?    Cervical spinal stenosis Yes    Abnormal posture     Neck pain     Dizziness and giddiness      Physician: Carolyne Blank NP  Visit Date: 12/5/2022    Physician Orders: PT Eval and Treat   Medical Diagnosis from Referral: cervical spinal stenosis  Evaluation Date: 10/25/2022  Authorization Period Expiration: 11/22/2022  Plan of Care Expiration: 12/20/2022   Progress Note Due: 12/14/2022  Visit # / Visits authorized: 9 / 11  FOTO: 1/3     Precautions: Standard     PTA Visit #: 2/5     Time In: 2:40pm (patient arrived 10 minutes late)  Time Out: 3:00pm  Total Billable Time: 20 minutes    SUBJECTIVE     Pt reports: needling went well last visit. Reports no pain or stiffness in his neck. Headaches are still at bay.     He was compliant with home exercise program.  Response to previous treatment: increased dizziness  Functional change: neck pain, limited neck ROM, occasional episodes of dizziness    Pain: 0/10 at rest   Location: left head and neck      OBJECTIVE     Objective Measures updated at progress report unless specified.          CERVICAL ACTIVE RANGE OF MOTION   Flexion 60 deg   Extension 40 deg    Right Side Bend 35 deg muscle pull   Left Side Bend 30 deg muscle pull   Right Rotation 75 deg    Left Rotation 75 deg    Craniocervical Angle 35 deg     Treatment     Demarco received the treatments listed below:      therapeutic exercises to develop strength, endurance, ROM, flexibility, posture, and core stabilization for 0 minutes including:     NOT PREFORMED   Cervical AROM in rotation to Left and Right, 3x each way hold for 5 seconds  Cervical AROM in flexion and extension, 3x hold for 5 seconds   Shoulder reverse rolls, 2x10  Cervical SNAGS for rotation x10  Seated Scapular Retractions 2x10  Upper trapezius Stretch  3x30s   Levator scapulae Stretch 3x30s   Pectoralis Stretch 2x30  Standing shoulder rows 20# cable, 2x10  Standing cable columns lats pull down with 20lb each arms, 2x10  Cross Symmetry Shoulder Abduction 3# 2x10 - cues to avoid chin protraction   Supine Chin Tuck 2x10x5 seconds  Chin tuck and lift 2x10   Resisted Supine Y's GTB 2x10  Resisted Bilateral upper extremity external rotation GTB 2x10   Resisted Open Book GTB with cervical rotation 2x10    manual therapy techniques: Joint mobilizations, Manual traction, Myofacial release, Soft tissue Mobilization, and Friction Massage were applied to the: cervical spine for 15 minutes, including:  Soft-tisue mobilization upper trapezius, suboccipitals, levator scapulae, carevical paraspinals    Trigger Point Dry Needling: Bilateral upper trapezius - multiple twitch responses to right     neuromuscular re-education activities to improve: Balance, Coordination, Proprioception, and Posture for 0 minutes. The following activities were included:   Not performed:   X1 viewing 3x1 minute seated   Narrow Base of Support Balance (Feet Together)   Left and Right Head Turns x1 minute   Up and Down Head Turns x1 minute    Eyes closed x1 minute  Semi-Tandem Stance on Foam - horizontal and vertical head turns and eyes close 30 seconds each    Ambulate with head looking in horizontal direction  Ambulate with head looking in vertical direction  Ambulation with diagonal head turns - few instances of instability.   Ambulation with x1 viewing  X1 viewing on Foam NBOS 3x30s  Vestibular exercises: refer to attached handout for review (deferred below today)     Oculomotor: Smooth pursuits    Oculomotor: Saccades  Gaze stabilization: Sitting   Gaze stabilization: Standing feet apart   Visuo-vestibular: Head/eyes moving in same direction   Visuo-vestibular: Head/eyes moving in opposite direction       Patient Education and Home Exercises   Home Exercises Provided and Patient Education Provided      Education provided:   - home exercise program     Written Home Exercises Provided: yes. Exercises were reviewed and Demarco was able to demonstrate them prior to the end of the session.  Demarco demonstrated good  understanding of the education provided. See EMR under Patient Instructions for exercises provided during therapy sessions.    ASSESSMENT   Mr. Means has attended 9 out patient physical therapy visits to address chronic neck pain and dizziness. Since starting therapy Mr. Means has greatly improved his cervical ROM - most greatly seen with cervical rotation. IN addition, he reports cessation of upper extremity numbness and tingling. No improvements in  strength noted with intervention. Patient educated to continue with home exercise program as maintenance program. At this time patient has met his below stated goals and will be discharged from therapy.     Demarco Is progressing well towards his goals.   Pt prognosis is Good.     Pt will continue to benefit from skilled outpatient physical therapy to address the deficits listed in the problem list box on initial evaluation, provide pt/family education and to maximize pt's level of independence in the home and community environment.     Pt's spiritual, cultural and educational needs considered and pt agreeable to plan of care and goals.     Anticipated barriers to physical therapy: None    Short Term Goals: 4 weeks   1. Patient demonstrates independence with HEP. - goal met 11/14/2022  2. Patient demonstrates independence with Postural Awareness.  - goal met 11/14/2022  3. Patient will report cessation of constant upper extremity numbness and tingling to only complaints of intermittent. - goal met 12/5/2022  4. Patient will improve cervical rotation to >55 degs - goal met 11/14/2022     Long Term Goals: 8 weeks   1. Patient will report cessation of dizziness with driving and functional activities - goal met 12/5/2022  2. Patient  demonstrates increased  strength in right upper extremity to >60 pounds. - goal not met 12/5/2022  3. Patient demonstrates improved overall function per NDI to 37% or less. - goal progressing 11/14/2022  4. Patient will gloablly improve cervical ROM to >10-15% from initial evaluation. - goal met 11/14/2022    PLAN     Plan of care Certification: 10/25/2022 to 12/20/2022 .     Outpatient Physical Therapy 2 times weekly for 8 weeks to include the following interventions: Cervical/Lumbar Traction, Electrical Stimulation  , Manual Therapy, Moist Heat/ Ice, Neuromuscular Re-ed, Patient Education, Self Care, Therapeutic Activities, and Therapeutic Exercise.      Nova [place], PT     Pt may be seen by PTA as part of the rehabilitation team.     Nova [place], PT

## 2022-12-27 NOTE — ED TRIAGE NOTES
Pt arrives to ED via EMS with CC of dizziness and HTN. PT states that he was feeling weak and that his right eye was getting blurry. Pt Denies any NVD and fever. Pt reports feeling some indigestion. Pt reports taking Benazepril last night and this morning. He is only prescribed to take it at night. Pt denies losing consciousness. Pt is alert and oriented and in no acute distress at this time.    normal/well-groomed/no distress

## 2023-03-23 ENCOUNTER — PES CALL (OUTPATIENT)
Dept: ADMINISTRATIVE | Facility: CLINIC | Age: 68
End: 2023-03-23
Payer: MEDICARE

## 2023-04-24 ENCOUNTER — PATIENT OUTREACH (OUTPATIENT)
Dept: ADMINISTRATIVE | Facility: HOSPITAL | Age: 68
End: 2023-04-24
Payer: MEDICARE

## 2023-04-24 NOTE — PROGRESS NOTES
Health Maintenance Due   Topic Date Due    COVID-19 Vaccine (1) Never done    TETANUS VACCINE  Never done    Aspirin/Antiplatelet Therapy  Never done    Shingles Vaccine (1 of 2) Never done    Colorectal Cancer Screening  04/07/2021    Influenza Vaccine (1) Never done     Chart reviewed.   Immunizations: Reconciled  Orders placed: N/A  Upcoming appts to satisfy CAREN topics: Labs 4/28/2023

## 2023-07-06 ENCOUNTER — PES CALL (OUTPATIENT)
Dept: ADMINISTRATIVE | Facility: CLINIC | Age: 68
End: 2023-07-06
Payer: MEDICARE

## 2024-03-27 DIAGNOSIS — N18.2 CHRONIC KIDNEY DISEASE, STAGE II (MILD): ICD-10-CM

## 2025-02-22 DIAGNOSIS — Z00.00 ENCOUNTER FOR MEDICARE ANNUAL WELLNESS EXAM: ICD-10-CM

## 2025-05-22 ENCOUNTER — TELEPHONE (OUTPATIENT)
Dept: INTERNAL MEDICINE | Facility: CLINIC | Age: 70
End: 2025-05-22
Payer: MEDICARE

## 2025-05-22 NOTE — TELEPHONE ENCOUNTER
Tried calling. Pt not seen in several years with Dr Mills. Number has been disconnected. No new number provided.